# Patient Record
Sex: FEMALE | Race: WHITE | NOT HISPANIC OR LATINO | Employment: FULL TIME | URBAN - METROPOLITAN AREA
[De-identification: names, ages, dates, MRNs, and addresses within clinical notes are randomized per-mention and may not be internally consistent; named-entity substitution may affect disease eponyms.]

---

## 2017-05-01 ENCOUNTER — HOSPITAL ENCOUNTER (EMERGENCY)
Facility: HOSPITAL | Age: 27
Discharge: HOME | End: 2017-05-01
Attending: EMERGENCY MEDICINE | Admitting: EMERGENCY MEDICINE
Payer: COMMERCIAL

## 2017-05-01 VITALS
DIASTOLIC BLOOD PRESSURE: 57 MMHG | RESPIRATION RATE: 16 BRPM | HEIGHT: 61 IN | WEIGHT: 157.6 LBS | SYSTOLIC BLOOD PRESSURE: 112 MMHG | BODY MASS INDEX: 29.76 KG/M2 | HEART RATE: 83 BPM | OXYGEN SATURATION: 100 % | TEMPERATURE: 98.7 F

## 2017-05-01 DIAGNOSIS — J01.00 ACUTE NON-RECURRENT MAXILLARY SINUSITIS: Primary | ICD-10-CM

## 2017-05-01 PROCEDURE — 99283 EMERGENCY DEPT VISIT LOW MDM: CPT

## 2017-05-01 RX ORDER — AZITHROMYCIN 250 MG/1
TABLET, FILM COATED ORAL
Qty: 6 TABLET | Refills: 0 | Status: SHIPPED | OUTPATIENT
Start: 2017-05-01 | End: 2019-06-04 | Stop reason: ALTCHOICE

## 2017-05-01 RX ORDER — OXYMETAZOLINE HYDROCHLORIDE 0.05 G/100ML
2 SPRAY NASAL ONCE
Status: COMPLETED | OUTPATIENT
Start: 2017-05-01 | End: 2017-05-01

## 2017-05-01 RX ADMIN — OXYMETAZOLINE HYDROCHLORIDE 2 SPRAY: 5 SPRAY NASAL at 14:31

## 2019-06-04 ENCOUNTER — TRANSCRIBE ORDERS (OUTPATIENT)
Dept: LAB | Facility: CLINIC | Age: 29
End: 2019-06-04

## 2019-06-04 ENCOUNTER — OFFICE VISIT (OUTPATIENT)
Dept: FAMILY MEDICINE CLINIC | Facility: CLINIC | Age: 29
End: 2019-06-04
Payer: COMMERCIAL

## 2019-06-04 ENCOUNTER — LAB (OUTPATIENT)
Dept: LAB | Facility: CLINIC | Age: 29
End: 2019-06-04
Payer: COMMERCIAL

## 2019-06-04 VITALS
HEART RATE: 100 BPM | OXYGEN SATURATION: 99 % | DIASTOLIC BLOOD PRESSURE: 78 MMHG | TEMPERATURE: 98.5 F | WEIGHT: 173 LBS | HEIGHT: 61 IN | SYSTOLIC BLOOD PRESSURE: 124 MMHG | RESPIRATION RATE: 12 BRPM | BODY MASS INDEX: 32.66 KG/M2

## 2019-06-04 DIAGNOSIS — Z13.220 SCREENING FOR LIPOID DISORDERS: ICD-10-CM

## 2019-06-04 DIAGNOSIS — F33.9 EPISODE OF RECURRENT MAJOR DEPRESSIVE DISORDER, UNSPECIFIED DEPRESSION EPISODE SEVERITY (HCC): ICD-10-CM

## 2019-06-04 DIAGNOSIS — M79.10 MYALGIA: ICD-10-CM

## 2019-06-04 DIAGNOSIS — Z13.6 SCREENING FOR CARDIOVASCULAR CONDITION: ICD-10-CM

## 2019-06-04 DIAGNOSIS — R53.83 FATIGUE, UNSPECIFIED TYPE: ICD-10-CM

## 2019-06-04 DIAGNOSIS — F43.23 ADJUSTMENT DISORDER WITH MIXED ANXIETY AND DEPRESSED MOOD: ICD-10-CM

## 2019-06-04 DIAGNOSIS — Z00.00 ANNUAL PHYSICAL EXAM: Primary | ICD-10-CM

## 2019-06-04 DIAGNOSIS — E66.9 CLASS 1 OBESITY WITHOUT SERIOUS COMORBIDITY WITH BODY MASS INDEX (BMI) OF 32.0 TO 32.9 IN ADULT, UNSPECIFIED OBESITY TYPE: ICD-10-CM

## 2019-06-04 DIAGNOSIS — R53.83 FATIGUE, UNSPECIFIED TYPE: Primary | ICD-10-CM

## 2019-06-04 DIAGNOSIS — Z13.1 SCREENING FOR DIABETES MELLITUS: ICD-10-CM

## 2019-06-04 DIAGNOSIS — F41.9 ANXIETY: ICD-10-CM

## 2019-06-04 PROBLEM — E66.811 CLASS 1 OBESITY WITHOUT SERIOUS COMORBIDITY WITH BODY MASS INDEX (BMI) OF 32.0 TO 32.9 IN ADULT: Status: ACTIVE | Noted: 2019-06-04

## 2019-06-04 LAB
25(OH)D3 SERPL-MCNC: 11.9 NG/ML (ref 30–100)
ALBUMIN SERPL BCP-MCNC: 3.9 G/DL (ref 3.5–5)
ALP SERPL-CCNC: 86 U/L (ref 46–116)
ALT SERPL W P-5'-P-CCNC: 21 U/L (ref 12–78)
ANION GAP SERPL CALCULATED.3IONS-SCNC: 10 MMOL/L (ref 4–13)
AST SERPL W P-5'-P-CCNC: 13 U/L (ref 5–45)
BASOPHILS # BLD AUTO: 0.04 THOUSANDS/ΜL (ref 0–0.1)
BASOPHILS NFR BLD AUTO: 0 % (ref 0–1)
BILIRUB SERPL-MCNC: 0.4 MG/DL (ref 0.2–1)
BUN SERPL-MCNC: 12 MG/DL (ref 5–25)
CALCIUM SERPL-MCNC: 9.2 MG/DL (ref 8.3–10.1)
CHLORIDE SERPL-SCNC: 103 MMOL/L (ref 100–108)
CHOLEST SERPL-MCNC: 132 MG/DL (ref 50–200)
CO2 SERPL-SCNC: 24 MMOL/L (ref 21–32)
CREAT SERPL-MCNC: 0.84 MG/DL (ref 0.6–1.3)
EOSINOPHIL # BLD AUTO: 0.13 THOUSAND/ΜL (ref 0–0.61)
EOSINOPHIL NFR BLD AUTO: 1 % (ref 0–6)
ERYTHROCYTE [DISTWIDTH] IN BLOOD BY AUTOMATED COUNT: 13.2 % (ref 11.6–15.1)
EST. AVERAGE GLUCOSE BLD GHB EST-MCNC: 105 MG/DL
FERRITIN SERPL-MCNC: 58 NG/ML (ref 8–388)
GFR SERPL CREATININE-BSD FRML MDRD: 96 ML/MIN/1.73SQ M
GLUCOSE P FAST SERPL-MCNC: 90 MG/DL (ref 65–99)
HBA1C MFR BLD: 5.3 % (ref 4.2–6.3)
HCT VFR BLD AUTO: 38.5 % (ref 34.8–46.1)
HDLC SERPL-MCNC: 48 MG/DL (ref 40–60)
HGB BLD-MCNC: 12.2 G/DL (ref 11.5–15.4)
IMM GRANULOCYTES # BLD AUTO: 0.04 THOUSAND/UL (ref 0–0.2)
IMM GRANULOCYTES NFR BLD AUTO: 0 % (ref 0–2)
IRON SERPL-MCNC: 114 UG/DL (ref 50–170)
LDLC SERPL CALC-MCNC: 69 MG/DL (ref 0–100)
LYMPHOCYTES # BLD AUTO: 1.86 THOUSANDS/ΜL (ref 0.6–4.47)
LYMPHOCYTES NFR BLD AUTO: 21 % (ref 14–44)
MCH RBC QN AUTO: 26.9 PG (ref 26.8–34.3)
MCHC RBC AUTO-ENTMCNC: 31.7 G/DL (ref 31.4–37.4)
MCV RBC AUTO: 85 FL (ref 82–98)
MONOCYTES # BLD AUTO: 0.71 THOUSAND/ΜL (ref 0.17–1.22)
MONOCYTES NFR BLD AUTO: 8 % (ref 4–12)
NEUTROPHILS # BLD AUTO: 6.23 THOUSANDS/ΜL (ref 1.85–7.62)
NEUTS SEG NFR BLD AUTO: 70 % (ref 43–75)
NONHDLC SERPL-MCNC: 84 MG/DL
NRBC BLD AUTO-RTO: 0 /100 WBCS
PLATELET # BLD AUTO: 306 THOUSANDS/UL (ref 149–390)
PMV BLD AUTO: 10 FL (ref 8.9–12.7)
POTASSIUM SERPL-SCNC: 3.8 MMOL/L (ref 3.5–5.3)
PROT SERPL-MCNC: 7.9 G/DL (ref 6.4–8.2)
RBC # BLD AUTO: 4.54 MILLION/UL (ref 3.81–5.12)
RETICS # AUTO: NORMAL 10*3/UL (ref 14097–95744)
RETICS # CALC: 1.35 % (ref 0.37–1.87)
SODIUM SERPL-SCNC: 137 MMOL/L (ref 136–145)
TIBC SERPL-MCNC: 313 UG/DL (ref 250–450)
TRIGL SERPL-MCNC: 77 MG/DL
TSH SERPL DL<=0.05 MIU/L-ACNC: 1.26 UIU/ML (ref 0.36–3.74)
WBC # BLD AUTO: 9.01 THOUSAND/UL (ref 4.31–10.16)

## 2019-06-04 PROCEDURE — 80061 LIPID PANEL: CPT

## 2019-06-04 PROCEDURE — 84443 ASSAY THYROID STIM HORMONE: CPT

## 2019-06-04 PROCEDURE — 83540 ASSAY OF IRON: CPT

## 2019-06-04 PROCEDURE — 1036F TOBACCO NON-USER: CPT | Performed by: FAMILY MEDICINE

## 2019-06-04 PROCEDURE — 99385 PREV VISIT NEW AGE 18-39: CPT | Performed by: FAMILY MEDICINE

## 2019-06-04 PROCEDURE — 80053 COMPREHEN METABOLIC PANEL: CPT

## 2019-06-04 PROCEDURE — 85045 AUTOMATED RETICULOCYTE COUNT: CPT

## 2019-06-04 PROCEDURE — 83036 HEMOGLOBIN GLYCOSYLATED A1C: CPT

## 2019-06-04 PROCEDURE — 85025 COMPLETE CBC W/AUTO DIFF WBC: CPT

## 2019-06-04 PROCEDURE — 83550 IRON BINDING TEST: CPT

## 2019-06-04 PROCEDURE — 99203 OFFICE O/P NEW LOW 30 MIN: CPT | Performed by: FAMILY MEDICINE

## 2019-06-04 PROCEDURE — 36415 COLL VENOUS BLD VENIPUNCTURE: CPT

## 2019-06-04 PROCEDURE — 82728 ASSAY OF FERRITIN: CPT

## 2019-06-04 PROCEDURE — 3008F BODY MASS INDEX DOCD: CPT | Performed by: FAMILY MEDICINE

## 2019-06-04 PROCEDURE — 82306 VITAMIN D 25 HYDROXY: CPT

## 2019-06-17 ENCOUNTER — PATIENT MESSAGE (OUTPATIENT)
Dept: FAMILY MEDICINE CLINIC | Facility: CLINIC | Age: 29
End: 2019-06-17

## 2019-06-17 DIAGNOSIS — E55.9 VITAMIN D DEFICIENCY: ICD-10-CM

## 2019-06-17 DIAGNOSIS — F41.9 ANXIETY: Primary | ICD-10-CM

## 2019-06-17 DIAGNOSIS — F33.9 EPISODE OF RECURRENT MAJOR DEPRESSIVE DISORDER, UNSPECIFIED DEPRESSION EPISODE SEVERITY (HCC): ICD-10-CM

## 2019-06-17 DIAGNOSIS — F43.23 ADJUSTMENT DISORDER WITH MIXED ANXIETY AND DEPRESSED MOOD: ICD-10-CM

## 2019-06-17 RX ORDER — ERGOCALCIFEROL (VITAMIN D2) 1250 MCG
50000 CAPSULE ORAL WEEKLY
Qty: 12 CAPSULE | Refills: 0 | Status: SHIPPED | OUTPATIENT
Start: 2019-06-17 | End: 2019-09-13

## 2019-07-10 ENCOUNTER — TELEPHONE (OUTPATIENT)
Dept: FAMILY MEDICINE CLINIC | Facility: CLINIC | Age: 29
End: 2019-07-10

## 2019-07-10 DIAGNOSIS — F41.9 ANXIETY: ICD-10-CM

## 2019-07-10 DIAGNOSIS — F33.9 EPISODE OF RECURRENT MAJOR DEPRESSIVE DISORDER, UNSPECIFIED DEPRESSION EPISODE SEVERITY (HCC): Primary | ICD-10-CM

## 2019-07-10 RX ORDER — ESCITALOPRAM OXALATE 20 MG/1
TABLET ORAL
Qty: 30 TABLET | Refills: 1 | Status: SHIPPED | OUTPATIENT
Start: 2019-07-10 | End: 2019-09-13

## 2019-07-10 NOTE — TELEPHONE ENCOUNTER
Patient called the Zoloft that she was put on is making her more anxious, she would really like to speak to someone today about this

## 2019-07-10 NOTE — TELEPHONE ENCOUNTER
Placed call to patient, patient stated she has been on Zoloft 50 mg for 3 weeks now and the last week and a half she has been experiencing severe anxiety  Driving in the car gives her anxiety, dealing with her children gives her anxiety, she stated basically everything gives her anxiety  Anxiety attacks happen everyday and are happening multiple times a day  Did experience a panic attack a couple days ago  Patient denies sweating, chest pain, confusion and suicidal ideations  Is still currently on the Zoloft 50 mg once day  Please advise

## 2019-07-10 NOTE — TELEPHONE ENCOUNTER
Would advise switching from Zoloft 50mg QD to alternative SSRI Lexapro 20mg - 1/2 pill daily x 1 week, then increase to 1 pill daily thereafter  Rx sent to retail pharmacy  She should start Lexapro the next day after taking Zoloft - no missed pills or taking both meds on the same day  Continue plan of care  - Repeat CMP (dated 7/17/19) 1 month after starting Lexapro  Reschedule F/U appt for 6 weeks after starting Lexparo -      Return in about 6 weeks (around 7/16/2019) for Recheck Anx/Dep, Adjust D/O, Obesity, Labs  Counseling may also be helpful  Refer to the back of insurance card for counseling options

## 2019-07-10 NOTE — TELEPHONE ENCOUNTER
Placed call to patient and informed patient of medication switch  Her appointment was rescheduled for 8/26/19  Patient had no further question or concerns at the time  Lab script for CMP will be mailed to patient

## 2019-09-13 ENCOUNTER — OFFICE VISIT (OUTPATIENT)
Dept: FAMILY MEDICINE CLINIC | Facility: CLINIC | Age: 29
End: 2019-09-13
Payer: COMMERCIAL

## 2019-09-13 ENCOUNTER — VBI (OUTPATIENT)
Dept: ADMINISTRATIVE | Facility: OTHER | Age: 29
End: 2019-09-13

## 2019-09-13 VITALS
OXYGEN SATURATION: 100 % | DIASTOLIC BLOOD PRESSURE: 76 MMHG | HEIGHT: 61 IN | TEMPERATURE: 98.9 F | RESPIRATION RATE: 16 BRPM | WEIGHT: 176.4 LBS | HEART RATE: 78 BPM | SYSTOLIC BLOOD PRESSURE: 118 MMHG | BODY MASS INDEX: 33.3 KG/M2

## 2019-09-13 DIAGNOSIS — F41.9 ANXIETY: Primary | ICD-10-CM

## 2019-09-13 DIAGNOSIS — G47.00 INSOMNIA, UNSPECIFIED TYPE: ICD-10-CM

## 2019-09-13 DIAGNOSIS — E55.9 VITAMIN D DEFICIENCY: ICD-10-CM

## 2019-09-13 DIAGNOSIS — E66.9 CLASS 1 OBESITY WITHOUT SERIOUS COMORBIDITY WITH BODY MASS INDEX (BMI) OF 33.0 TO 33.9 IN ADULT, UNSPECIFIED OBESITY TYPE: ICD-10-CM

## 2019-09-13 DIAGNOSIS — F33.9 EPISODE OF RECURRENT MAJOR DEPRESSIVE DISORDER, UNSPECIFIED DEPRESSION EPISODE SEVERITY (HCC): ICD-10-CM

## 2019-09-13 PROCEDURE — 99214 OFFICE O/P EST MOD 30 MIN: CPT | Performed by: FAMILY MEDICINE

## 2019-09-13 RX ORDER — DULOXETIN HYDROCHLORIDE 30 MG/1
30 CAPSULE, DELAYED RELEASE ORAL DAILY
Qty: 7 CAPSULE | Refills: 0 | Status: SHIPPED | OUTPATIENT
Start: 2019-09-13 | End: 2019-09-20

## 2019-09-13 RX ORDER — DULOXETIN HYDROCHLORIDE 60 MG/1
60 CAPSULE, DELAYED RELEASE ORAL DAILY
Qty: 30 CAPSULE | Refills: 1 | Status: SHIPPED | OUTPATIENT
Start: 2019-09-13 | End: 2019-10-25

## 2019-09-13 NOTE — ASSESSMENT & PLAN NOTE
Worsening  D/C Lexapro 20mg QD  Start Cymbalta 60mg QD  Patient encouraged to resume counseling  Smart phone abel list given

## 2019-09-13 NOTE — ASSESSMENT & PLAN NOTE
Vitamin D Deficiency - Recommend start multivitamin and prescription vitamin D (Drisdol)  When 12 weeks of Drisdol completed, continue multivitamin and start over-the-counter Vitamin D3 1,000-3,000 International Units daily    Check vitamin D level 1 week afterwards completing Drisdol

## 2019-09-13 NOTE — TELEPHONE ENCOUNTER
Shane Lambert, DO  [address]             I have reviewed both our old [address] and need your assistance in locating Immunization(s)  Per Patient, testing was done by Sammie Severance you  Unsure of Tdap status   [facility] Dr Avelina Dawkins delivered her children in 2013 at [facility] check for Tdap with OB or [facility]                09/13/2019 10:23 AM Phone (Viktoriya Marshall) Gabby Botello at [facility] Dr Avelina Dawkins (Provider) [phone] Remove   Call Complete - Asked for immunization record (tdap); she stats Dr Avelina Dawkins & practice do not ask for proof of immuzations; they list only 'patient reported' immunization status in the chart notes  They also do not provide immunizations to patients         By Pio English MA

## 2019-09-13 NOTE — PATIENT INSTRUCTIONS
Apps and Websites for Counseling, Anxiety/Depression, Chronic Pain, Lifestyle Change, Problem-solving, Self-Esteem, Anger Management, Coping with Uncertainty    (Prices current as of 9/5/19)    1  Mood Kit - Available in [employer] Deepa Store for $4 99  Supports a person's success in specific situations, includes thought , mood tracker, and journal   Can be accessed in an unstructured way and used as an unguided self-help deepa  2   Moodnotes - Available in [employer] Deep[employer] Store for $4 99  Focuses on healthier thinking habits and identifying "traps" in thinking  Tracks mood over a period of time and identifies factors that influence mood  3   MoodMission - Available in [facility] for free  Includes five "missions" to promote confidence in handling stressors and coping in specific situations  The deepa personalizes its style and techniques based on when the user engages it most frequently  In-deepa rewards are used to motivate, increase fun and self-confidence  Helpful for patients who need improvement in mood or a decrease in anxiety and depression symptoms  4    What's Up - Available in [employer] for free  Recognizes negative thinking patterns and methods to overcome them  Uses helpful metaphors, a catastrophe scale, grounding techniques, and breathing exercises  Has the ability to sync data across multiple devices and protect this information with a unique pass code  Has the capability to be active in forums where people can discuss similar feelings and strategies that have been helpful  5   Moodpath - Available in [employer] for free  Uses daily screenings to create a better understanding of thoughts, feelings, and emotions  When needed, it provides a discussion guide to talking with a medical professional based on the responses to daily screenings    Includes over 150 psychological exercises and videos to promote and strengthen overall mental health  6   MindShift - Available in [employer] for free  Helpful for youth and young adults in coping with anxiety  Creates an individualized toolbox to help users deal with test anxiety, perfectionism, social anxiety, worry, panic, and conflict  Includes directions on how to construct belief experiments to trial common beliefs that feed anxiety, guided relaxation, as well as tools and tips to help establish and accomplish goals  Differentiates between helpful and unhelpful anxiety, and explains how to overcome fears by gradually facing them in manageable steps  7   CBT-I  - Available in [employer] for free  For insomnia  Educates about sleep, developing positive sleep routines, and improved sleep environment  Encourages user to change behaviors which will provide confidence for better sleep on a regular basis  8   [employer] - Free website  Provides self-help and therapy resources that encourages change to combat negative and destructive thought patterns  Includes access to numerous handouts on a variety of symptoms related to anxiety, depression, low self-esteem, panic attacks, social disorders, and more  The solution section has interventions that can be printed and saved for future use  9   Woebot - Available in [employer] for free  Recommended for age 16+  Friendly self-care  that helps you think through situations with step-by-step guidance using counseling tools, learn about yourself with intelligent mood tracking, and master skills to reduce stress and live happier through 100+ evidence-based stories from clinicians  Chat as often or as little as you'd like, whenever you'd like to  10   [place]ne:  KELLI  CBT DBT Chatbot - Available in Apple appsstore and Google Play for free, has in-abel purchases  Recommended for 12+  Psychologist support at $30/month, 50% off to start    Christiana Knowles / chatbot is free  Uses techniques of CBT, DBT, yoga, and meditation to support your needs regarding stress, anxiety, sleep, loss, and a full range of other mental health and wellness issues  11   Curable Pain Relief - Available in [employer] Deepa store and Google Play for free, has in-deepa purchases  Teaches about the chronic pain cycle and how to reverse it, while retraining your brain and nervous system for long-term results  Smart  Tomasa guides user through updates in pain science to identify, target, eliminate the factors that are keeping user "stuck" in the pain cycle  12   [employer] [facility] - Available in Apple Deepa store and Google Play for a fee  Subscription service, starting at $59/week (billed monthly)  All plans include unlimited messaging, and add live video sessions if desired  Unlimited messaging therapy for teens ages 15-14 and special services for couples therapy  You can change therapists or stop subscription renewal at any time  Recommended for 13+  User is matched with a licensed therapist in your state and communicate on your device by text, audio, and video from anywhere, at any time  User will hear back at least once a day, 5 days per week  Refer to the back of insurance card for counseling options  Low vitamin D - Recommend start multivitamin and over-the-counter vitamin D3 1000 - 3000 International Units daily  Insomnia   AMBULATORY CARE:   Insomnia  is a condition that makes it hard to fall or stay asleep  Lack of sleep can lead to attention or memory problems during the day  You may also be tarango, depressed, clumsy, or have headaches  Contact your healthcare provider if:   · Your symptoms do not get better, or they get worse  · You begin to use drugs or alcohol to fall asleep  · You have questions or concerns about your condition or care  Medicines:   · Medicines  may help you sleep more regularly or help you feel less anxious      · Take your medicine as directed  Contact your healthcare provider if you think your medicine is not helping or if you have side effects  Tell him or her if you are allergic to any medicine  Keep a list of the medicines, vitamins, and herbs you take  Include the amounts, and when and why you take them  Bring the list or the pill bottles to follow-up visits  Carry your medicine list with you in case of an emergency  What you can do to improve your sleep:   · Create a sleep schedule  This will help you form a sleep routine  Keep a record of your sleep patterns, and any sleeping problems you have  Bring the record to follow-up visits with healthcare providers  · Do not take naps  Naps could make it hard for you to fall asleep at bedtime  · Keep your bedroom cool, quiet, and dark  Turn on white noise, such as a fan, to help you relax  Do not use your bed for any activity that will keep you awake  Do not read, exercise, eat, or watch TV in your bedroom  · Get up if you do not fall asleep within 20 minutes  Move to another room and do something relaxing until you become sleepy  · Limit caffeine, alcohol, and food to earlier in the day  Only drink caffeine in the morning  Do not drink alcohol within 6 hours of bedtime  Do not eat a heavy meal right before you go to bed  · Exercise regularly  Daily exercise may help you sleep better  Do not exercise within 4 hours of bedtime  Follow up with your healthcare provider as directed: Your healthcare provider may refer you for cognitive behavioral therapy  A behavioral therapist may help you find ways to relax, decrease stress, and improve sleep  Write down your questions so you remember to ask them during your visits  © 2017 [address] [employer]  Information is for End User's use only and may not be sold, redistributed or otherwise used for commercial purposes   All illustrations and images included in CareNotes® are the copyrighted property of [employer] â€[employer]/ , [employer]  or [employer]  The above information is an  only  It is not intended as medical advice for individual conditions or treatments  Talk to your doctor, nurse or pharmacist before following any medical regimen to see if it is safe and effective for you

## 2019-09-20 ENCOUNTER — OFFICE VISIT (OUTPATIENT)
Dept: FAMILY MEDICINE CLINIC | Facility: CLINIC | Age: 29
End: 2019-09-20
Payer: COMMERCIAL

## 2019-09-20 VITALS
HEART RATE: 82 BPM | RESPIRATION RATE: 16 BRPM | SYSTOLIC BLOOD PRESSURE: 106 MMHG | DIASTOLIC BLOOD PRESSURE: 62 MMHG | WEIGHT: 175 LBS | TEMPERATURE: 98.4 F | BODY MASS INDEX: 33.04 KG/M2 | HEIGHT: 61 IN | OXYGEN SATURATION: 100 %

## 2019-09-20 DIAGNOSIS — J32.2 ETHMOID SINUSITIS, UNSPECIFIED CHRONICITY: Primary | ICD-10-CM

## 2019-09-20 DIAGNOSIS — J02.9 SORE THROAT: ICD-10-CM

## 2019-09-20 DIAGNOSIS — E66.9 CLASS 1 OBESITY WITHOUT SERIOUS COMORBIDITY WITH BODY MASS INDEX (BMI) OF 33.0 TO 33.9 IN ADULT, UNSPECIFIED OBESITY TYPE: ICD-10-CM

## 2019-09-20 DIAGNOSIS — R05.9 COUGH: ICD-10-CM

## 2019-09-20 LAB — S PYO AG THROAT QL: NEGATIVE

## 2019-09-20 PROCEDURE — 99214 OFFICE O/P EST MOD 30 MIN: CPT | Performed by: FAMILY MEDICINE

## 2019-09-20 PROCEDURE — 3008F BODY MASS INDEX DOCD: CPT | Performed by: FAMILY MEDICINE

## 2019-09-20 PROCEDURE — 87880 STREP A ASSAY W/OPTIC: CPT | Performed by: FAMILY MEDICINE

## 2019-09-20 RX ORDER — FLUTICASONE PROPIONATE 50 MCG
2 SPRAY, SUSPENSION (ML) NASAL DAILY PRN
Qty: 1 BOTTLE | Refills: 0 | Status: SHIPPED | OUTPATIENT
Start: 2019-09-20 | End: 2019-10-25 | Stop reason: ALTCHOICE

## 2019-09-20 RX ORDER — BENZONATATE 100 MG/1
100 CAPSULE ORAL 3 TIMES DAILY PRN
Qty: 30 CAPSULE | Refills: 0 | Status: SHIPPED | OUTPATIENT
Start: 2019-09-20 | End: 2019-09-30

## 2019-09-20 NOTE — PROGRESS NOTES
Assessment/Plan:  Problem List Items Addressed This Visit        Other    Class 1 obesity without serious comorbidity with body mass index (BMI) of 33 0 to 33 9 in adult     Stable  Recommend lifestyle modifications  Other Visit Diagnoses     Ethmoid sinusitis, unspecified chronicity    -  Primary    Relevant Medications    fluticasone (FLONASE) 50 mcg/act nasal spray    Suspect viral etiology  Advise Claudetta Rhodes med sinus rinse kit, Mucinex, Claritin/Zyrtec/Allegra  Avoid decongestants if you have high blood pressure  Start Flonase, Tessalon PRN  Sore throat        Relevant Orders    POCT rapid strepA (Completed) - Negative    See above  See above  Cough        Relevant Medications    benzonatate (TESSALON PERLES) 100 mg capsule           Return if symptoms worsen or fail to improve, for Return in about 6 weeks (around 10/25/2019) for  Recheck Anx/Dep, Adjust D/O, Obesity, Labs  Future Appointments   Date Time Provider Samuel Alamo   10/25/2019  8:00 AM Aliza Walker DO [facility]        Subjective:     [facility] is a 34 y o  female who presents today for a follow-up on her acute medical conditions  HPI:  Chief Complaint   Patient presents with    Sore Throat     started over the weekend, went away and is now back, tension/sinus headache 1-2 times daily     Cough     x 4 days  Denies fever  -- Above per clinical staff and reviewed  --    HPI      Today:      Sore throat - Symptoms x 4-5 days  Intermittent symptoms  Worse c cough  Cough - Symptoms x 4 days  Productive cough  Occurs during day and night, producing insomnia   +Fluids  No sick contacts  Using Dayquil x 2 days c symptoms relief x 1 hour  No rhinorrhea or PND  Ethmoid pressure occurs in AM and improves throughout the day        The following portions of the patient's history were reviewed and updated as appropriate: allergies, current medications, past family history, past medical history, past social history, past surgical history and problem list       Review of Systems   Constitutional: Positive for fatigue  Negative for appetite change, chills, diaphoresis and fever  HENT: Positive for sinus pressure and sore throat  Negative for postnasal drip  Respiratory: Positive for cough and shortness of breath (With SAMAYOA or cough)  Negative for chest tightness and wheezing  Cardiovascular: Negative for chest pain  Gastrointestinal: Negative for abdominal pain, blood in stool, diarrhea, nausea and vomiting  Genitourinary: Negative for dysuria  Current Outpatient Medications   Medication Sig Dispense Refill    DULoxetine (CYMBALTA) 60 mg delayed release capsule Take 1 capsule (60 mg total) by mouth daily Start after taking 30mg daily x 1 week  30 capsule 1    benzonatate (TESSALON PERLES) 100 mg capsule Take 1 capsule (100 mg total) by mouth 3 (three) times a day as needed for cough for up to 10 days 30 capsule 0    fluticasone (FLONASE) 50 mcg/act nasal spray 2 sprays into each nostril daily as needed for rhinitis for up to 10 days 1 Bottle 0     No current facility-administered medications for this visit  Objective:  /62   Pulse 82   Temp 98 4 °F (36 9 °C) (Tympanic)   Resp 16   Ht 5' 1 02" (1 55 m)   Wt 79 4 kg (175 lb)   SpO2 100%   BMI 33 04 kg/m²    Wt Readings from Last 3 Encounters:   09/20/19 79 4 kg (175 lb)   09/13/19 80 kg (176 lb 6 4 oz)   06/04/19 78 5 kg (173 lb)      BP Readings from Last 3 Encounters:   09/20/19 106/62   09/13/19 118/76   06/04/19 124/78          Physical Exam   Constitutional: She is oriented to person, place, and time  She appears well-developed and well-nourished  HENT:   Head: Normocephalic and atraumatic  Right Ear: Tympanic membrane, external ear and ear canal normal    Left Ear: Tympanic membrane, external ear and ear canal normal    Nose: Mucosal edema present   Right sinus exhibits no maxillary sinus tenderness and no frontal sinus tenderness  Left sinus exhibits no maxillary sinus tenderness and no frontal sinus tenderness  Mouth/Throat: Uvula is midline, oropharynx is clear and moist and mucous membranes are normal  No tonsillar exudate  Eyes: Conjunctivae and EOM are normal    Neck: Neck supple  Cardiovascular: Normal rate, regular rhythm, normal heart sounds and intact distal pulses  Pulmonary/Chest: Effort normal and breath sounds normal    Musculoskeletal: She exhibits no edema or tenderness  Lymphadenopathy:     She has no cervical adenopathy  Neurological: She is alert and oriented to person, place, and time  Skin: No rash noted  Psychiatric: She has a normal mood and affect  Her behavior is normal  Judgment and thought content normal    Nursing note and vitals reviewed  Lab Results:      Lab Results   Component Value Date    WBC 9 01 06/04/2019    HGB 12 2 06/04/2019    HCT 38 5 06/04/2019     06/04/2019    TRIG 77 06/04/2019    HDL 48 06/04/2019    ALT 21 06/04/2019    AST 13 06/04/2019    K 3 8 06/04/2019     06/04/2019    CREATININE 0 84 06/04/2019    BUN 12 06/04/2019    CO2 24 06/04/2019    GLUF 90 06/04/2019    HGBA1C 5 3 06/04/2019     No results found for: URICACID  Invalid input(s): BASENAME Vitamin D    No results found       POCT Labs

## 2019-09-20 NOTE — PATIENT INSTRUCTIONS
[facility], Mucinex, Claritin/Zyrtec/Allegra  Avoid decongestants if you have high blood pressure  Consider using antibiotic for sinus infection if you develop fever 100 4 or greater, symptoms wax/wane, or symptoms do not improve within 10 days of onset  You may use Motrin (Ibuprofen) up to 800mg 3 times daily with food (in 24 hours) as needed for pain or fever  You may use Tylenol (Acetaminophen) up to 3,000mg daily (in 24 hours) as needed for pain or fever

## 2019-10-23 NOTE — PROGRESS NOTES
Assessment/Plan:  Problem List Items Addressed This Visit        Other    Class 1 obesity without serious comorbidity with body mass index (BMI) of 33 0 to 33 9 in adult     Stable  Recommend lifestyle modifications  Anxiety - Primary     Improved, but still symptomatic  D/C Cymbalta 60mg QD due to worsening depression  Start Effexor XR 75mg QD  Resume counseling  Smartphone abel list previously given  Relevant Medications    venlafaxine (EFFEXOR-XR) 37 5 mg 24 hr capsule    Episode of recurrent major depressive disorder (HCC)     Worsening  D/C Cymbalta 60mg QD due to worsening depression  Start Effexor XR 75mg QD  Resume counseling  Smartphone abel list previously given  Relevant Medications    venlafaxine (EFFEXOR-XR) 37 5 mg 24 hr capsule    Vitamin D deficiency     Pending labs  S/p Drisdol  Continue OTC MVI and Vitamin D 3,000IU daily  Other Visit Diagnoses     Need for vaccination        Relevant Orders    influenza vaccine, [id number], quadrivalent, 0 5 mL, preservative-free, for adult and pediatric patients 6 mos+ (AFLURIA, FLUARIX, FLULAVAL, FLUZONE) (Completed)           Return in about 6 weeks (around 12/6/2019) for Recheck Anx/Dep, Adjust D/O, Obesity, Labs  Future Appointments   Date Time Provider Samuel [place]   12/9/2019  8:40 AM Gricelda Xie DO [facility]        Subjective:     Wendy Tovar is a 34 y o  female who presents today for a follow-up on her chronic medical conditions  HPI:  Chief Complaint   Patient presents with    Follow-up     6 week f/u, flu shot      -- Above per clinical staff and reviewed  --      HPI      Today:      Return if symptoms worsen or fail to improve, for Return in about 6 weeks (around 10/25/2019) for  Recheck Anx/Dep, Adjust D/O, Obesity, Labs     Did not complete labs  Plans to go 11/25/19        Obesity - Watching diet - eating smaller portions, less junk food, cut back to 10oz soda daily   No Exercise - Walking 2 miles at job  Wants to join a gym        Vitamin D Deficiency - s/p Christal  Taking MVI and OTC Vitamin D 3,000IU daily          Adjustment Disorder - On Cymbalta 60mg QD x 6 weeks  Anxiety is improved, but depression is worse in the past few days s trigger  D/C Lexapro 20mg QD x 6 weeks due to worsening anxiety  D/C Zoloft 50mg QD x 3 weeks due to worsening anxiety  Last saw counselor , which was helpful  She plans to schedule an appointment in the future  She is less quick to anger, less easily annoyed, has less unprovoked crying  Improved motivation  Feels that mood is 40% improved, but feels that anxiety is still uncontrolled  She is having anxiety attacks daily over things that she thinks are not important  She is having 2-3 anxiety attacks daily (previously 6-9 attacks), lasting 2-3 minutes  Previously, anxiety attacks last a couple to 20-30 minutes  Anxiety provoked by famiy stress as she is  from her   Breathing exercises have been helpful  Symptoms since  since birth of twin sons  No other mood meds or counseling previously  [facility] [address] social supports  No SI/HI/AH/VH        Insomnia - Intermittent  Can fall asleep after 1 hour, but sometimes awakens after 1 5-2 / 4 hours of sleep  Sleeping for 6 hours interrupted  Unsure of trigger  Has racing thoughts  Drinks 8-10oz caffeine daily    Denies snoring or witnessed apnea          PHQ-9 Depression Screening    PHQ-9:    Frequency of the following problems over the past two weeks:       Little interest or pleasure in doing things:  1 - several days  Feeling down, depressed, or hopeless:  1 - several days  Trouble falling or staying asleep, or sleeping too much:  3 - nearly every day  Feeling tired or having little energy:  2 - more than half the days  Poor appetite or overeatin - several days  Feeling bad about yourself - or that you are a failure or have let yourself or your family down:  1 - several days  Trouble concentrating on things, such as reading the newspaper or watching television:  0 - not at all  Moving or speaking so slowly that other people could have noticed  Or the opposite - being so fidgety or restless that you have been moving around a lot more than usual:  0 - not at all  Thoughts that you would be better off dead, or of hurting yourself in some way:  0 - not at all  PHQ-2 Score:  2  PHQ-9 Score:  9       BLADIMIR-7 Flowsheet Screening      Most Recent Value   Over the last two weeks, how often have you been bothered by the following problems? Feeling nervous, anxious, or on edge  2   Not being able to stop or control worrying  2   Worrying too much about different things  1   Trouble relaxing   1   Being so restless that it's hard to sit still  1   Becoming easily annoyed or irritable   1   Feeling afraid as if something awful might happen  1   How difficult have these problems made it for you to do your work, take care of things at home, or get along with other people? Somewhat difficult   BLADIMIR Score   9              From previous note:    Return in about 6 weeks (around 7/16/2019) for Recheck Anx/Dep, Adjust D/O, Obesity, Labs      Did not complete labs        Obesity - Trying to watch diet - eating smaller portions   +Regular Exercise - Walking dogs 30 minutes, 7 days per week  Walking 3-4 miles at job        Vitamin D Deficiency - s/p Drisdol  Not taking MVI or OTC Vitamin D         Adjustment Disorder - On Lexapro 20mg QD x 6 weeks  D/C Zoloft 50mg QD x 3 weeks due to worsening anxiety  Last saw counselor 8/19, which was helpful  She plans to schedule an appointment in the future  She is less quick to anger, less easily annoyed, has less unprovoked crying  Improved motivation  Feels that mood is 40% improved, but feels that anxiety is still uncontrolled  She is having anxiety attacks daily over things that she thinks are not important    She has anxiety attack prior to work despite liking her job and co-workers  Anxiety attacks last a couple to 20-30 minutes  Breathing exercises have been helpful  Symptoms since 2013 since birth of twin sons  No other mood meds or counseling previously  [employer] [employer] [facility] [employer] social supports  No SI/HI/AH/VH        Insomnia - Symptoms x 3 weeks  Can fall asleep easily, but awakens after 1 5-2 hours of sleep  Sleeping for 6 hours interrupted  Unsure of trigger  Drinks 8-10oz caffeine daily  Denies snoring or witnessed apnea        PHQ-9 Depression Screening    PHQ-9:    Frequency of the following problems over the past two weeks:       Little interest or pleasure in doing things:  1 - several days  Feeling down, depressed, or hopeless:  1 - several days  Trouble falling or staying asleep, or sleeping too much:  3 - nearly every day  Feeling tired or having little energy:  1 - several days  Poor appetite or overeatin - several days  Feeling bad about yourself - or that you are a failure or have let yourself or your family down:  1 - several days  Trouble concentrating on things, such as reading the newspaper or watching television:  1 - several days  Moving or speaking so slowly that other people could have noticed  Or the opposite - being so fidgety or restless that you have been moving around a lot more than usual:  1 - several days  Thoughts that you would be better off dead, or of hurting yourself in some way:  1 - several days  PHQ-2 Score:  2  PHQ-9 Score:  11             BLADIMIR-7 Flowsheet Screening      Most Recent Value   Over the last two weeks, how often have you been bothered by the following problems?     Feeling nervous, anxious, or on edge  2   Not being able to stop or control worrying  1   Worrying too much about different things  1   Trouble relaxing   1   Being so restless that it's hard to sit still  2   Becoming easily annoyed or irritable   1   Feeling afraid as if something awful might happen  1   How difficult have these problems made it for you to do your work, take care of things at home, or get along with other people? Somewhat difficult   BLADIMIR Score   9          MDQ:  3     From previous note:     Obesity - Trying to watch diet   +Regular Exercise - Walking dogs 30 minutes, 7 days per week        Migraines - Occurs less often - 1-2 times per month, once weekly when younger   Has had migraines since 14yo   Using Aleve / Ibuprofen / Tylenol PRN, rest, helpful   B/L Ethmoid pain - feels like eye strain, can progress to achy, throbbing sensation   Currently 0/10, Max 5-6/10   Lasts 30-45 minutes to 2-3 hours   Denies aura   Sometimes has associated nausea   Denies photophobia and phonophobia   No migraine Rx previously          Adjustment Disorder - Symptoms since 2013 since birth of twin sons    "I'm all over the place mood-wise "  She feels she is quick to anger, easily annoyed, has unprovoked crying   No mood meds or counseling previously   Good social supports  Sofia Orange thoughts about leaving a few days ago, but does not have a plan   Would drive until she runs out of gas   No HI/AH/VH  Joseline Lively her job early 3/19 - no unemployement, has been applying to jobs s success  Monty Cisneros is also stress by finances   She is at home with her kids, laying on the couch, low motivation        PHQ-9 Depression Screening    PHQ-9:    Frequency of the following problems over the past two weeks:       Little interest or pleasure in doing things:  2 - more than half the days  Feeling down, depressed, or hopeless:  2 - more than half the days  Trouble falling or staying asleep, or sleeping too much:  3 - nearly every day  Feeling tired or having little energy:  2 - more than half the days  Poor appetite or overeating:  3 - nearly every day  Feeling bad about yourself - or that you are a failure or have let yourself or your family down:  3 - nearly every day  Trouble concentrating on things, such as reading the newspaper or watching television:  1 - several days  Moving or speaking so slowly that other people could have noticed  Or the opposite - being so fidgety or restless that you have been moving around a lot more than usual:  0 - not at all  Thoughts that you would be better off dead, or of hurting yourself in some way:  1 - several days  PHQ-2 Score:  4  PHQ-9 Score:  17               BLADIMIR-7 Flowsheet Screening      Most Recent Value   Over the last two weeks, how often have you been bothered by the following problems? Feeling nervous, anxious, or on edge  1   Not being able to stop or control worrying  3   Worrying too much about different things  3   Trouble relaxing   2   Being so restless that it's hard to sit still  1   Becoming easily annoyed or irritable   1   Feeling afraid as if something awful might happen  1   How difficult have these problems made it for you to do your work, take care of things at home, or get along with other people?   Very difficult   BLADIMIR Score   12                Reviewed:  Labs 6/4/19,  Gyn 9/6/17     Sees [facility]   Next appt 9/18 - overdue     Unsure of Tdap status  Lala Lauren delivered her children in 2013  The following portions of the patient's history were reviewed and updated as appropriate: allergies, current medications, past family history, past medical history, past social history, past surgical history and problem list       Review of Systems   Constitutional: Negative for appetite change, chills, diaphoresis, fatigue and fever  Respiratory: Negative for chest tightness and shortness of breath  Cardiovascular: Negative for chest pain  Gastrointestinal: Negative for abdominal pain, blood in stool, diarrhea, nausea and vomiting  Genitourinary: Negative for dysuria          Current Outpatient Medications   Medication Sig Dispense Refill    Cholecalciferol (VITAMIN D3) 3000 units TABS Take 1 tablet by mouth daily      Multiple Vitamin (MULTIVITAMIN) tablet Take 1 tablet by mouth daily      venlafaxine (EFFEXOR-XR) 37 5 mg 24 hr capsule 1 tab by mouth once daily x 1 week, then increase to 2 tabs by mouth once daily thereafter  60 capsule 1     No current facility-administered medications for this visit  Objective:  /76   Pulse 84   Temp 97 8 °F (36 6 °C) (Tympanic)   Resp 16   Ht 5' 1 02" (1 55 m)   Wt 80 5 kg (177 lb 6 4 oz)   LMP 01/01/2016 (Within Years)   SpO2 100%   Breastfeeding? No   BMI 33 50 kg/m²    Wt Readings from Last 3 Encounters:   10/25/19 80 5 kg (177 lb 6 4 oz)   09/20/19 79 4 kg (175 lb)   09/13/19 80 kg (176 lb 6 4 oz)      BP Readings from Last 3 Encounters:   10/25/19 112/76   09/20/19 106/62   09/13/19 118/76          Physical Exam   Constitutional: She is oriented to person, place, and time  She appears well-developed and well-nourished  HENT:   Head: Normocephalic and atraumatic  Eyes: Conjunctivae are normal    Neck: Neck supple  Cardiovascular: Normal rate, regular rhythm, normal heart sounds and intact distal pulses  Pulmonary/Chest: Effort normal and breath sounds normal    Musculoskeletal: She exhibits no edema or tenderness  Neurological: She is alert and oriented to person, place, and time  Psychiatric: She has a normal mood and affect  Her behavior is normal  Judgment and thought content normal    Nursing note and vitals reviewed  Lab Results:      Lab Results   Component Value Date    WBC 9 01 06/04/2019    HGB 12 2 06/04/2019    HCT 38 5 06/04/2019     06/04/2019    TRIG 77 06/04/2019    HDL 48 06/04/2019    ALT 21 06/04/2019    AST 13 06/04/2019    K 3 8 06/04/2019     06/04/2019    CREATININE 0 84 06/04/2019    BUN 12 06/04/2019    CO2 24 06/04/2019    GLUF 90 06/04/2019    HGBA1C 5 3 06/04/2019     No results found for: URICACID  Invalid input(s): BASENAME Vitamin D    No results found       POCT Labs

## 2019-10-25 ENCOUNTER — TRANSCRIBE ORDERS (OUTPATIENT)
Dept: LAB | Facility: CLINIC | Age: 29
End: 2019-10-25

## 2019-10-25 ENCOUNTER — LAB (OUTPATIENT)
Dept: LAB | Facility: CLINIC | Age: 29
End: 2019-10-25
Payer: COMMERCIAL

## 2019-10-25 ENCOUNTER — OFFICE VISIT (OUTPATIENT)
Dept: FAMILY MEDICINE CLINIC | Facility: CLINIC | Age: 29
End: 2019-10-25
Payer: COMMERCIAL

## 2019-10-25 VITALS
SYSTOLIC BLOOD PRESSURE: 112 MMHG | WEIGHT: 177.4 LBS | HEART RATE: 84 BPM | HEIGHT: 61 IN | DIASTOLIC BLOOD PRESSURE: 76 MMHG | RESPIRATION RATE: 16 BRPM | OXYGEN SATURATION: 100 % | BODY MASS INDEX: 33.49 KG/M2 | TEMPERATURE: 97.8 F

## 2019-10-25 DIAGNOSIS — E55.9 VITAMIN D DEFICIENCY: ICD-10-CM

## 2019-10-25 DIAGNOSIS — F41.9 ANXIETY: Primary | ICD-10-CM

## 2019-10-25 DIAGNOSIS — E66.9 CLASS 1 OBESITY WITHOUT SERIOUS COMORBIDITY WITH BODY MASS INDEX (BMI) OF 33.0 TO 33.9 IN ADULT, UNSPECIFIED OBESITY TYPE: ICD-10-CM

## 2019-10-25 DIAGNOSIS — F33.9 EPISODE OF RECURRENT MAJOR DEPRESSIVE DISORDER, UNSPECIFIED DEPRESSION EPISODE SEVERITY (HCC): ICD-10-CM

## 2019-10-25 DIAGNOSIS — Z23 NEED FOR VACCINATION: ICD-10-CM

## 2019-10-25 LAB — 25(OH)D3 SERPL-MCNC: 20.2 NG/ML (ref 30–100)

## 2019-10-25 PROCEDURE — 99214 OFFICE O/P EST MOD 30 MIN: CPT | Performed by: FAMILY MEDICINE

## 2019-10-25 PROCEDURE — 90471 IMMUNIZATION ADMIN: CPT | Performed by: FAMILY MEDICINE

## 2019-10-25 PROCEDURE — 90686 IIV4 VACC NO PRSV 0.5 ML IM: CPT | Performed by: FAMILY MEDICINE

## 2019-10-25 PROCEDURE — 36415 COLL VENOUS BLD VENIPUNCTURE: CPT

## 2019-10-25 PROCEDURE — 82306 VITAMIN D 25 HYDROXY: CPT

## 2019-10-25 RX ORDER — GLUCOSAMINE HCL 500 MG
1 TABLET ORAL DAILY
COMMUNITY

## 2019-10-25 RX ORDER — VENLAFAXINE HYDROCHLORIDE 37.5 MG/1
CAPSULE, EXTENDED RELEASE ORAL
Qty: 60 CAPSULE | Refills: 1 | Status: SHIPPED | OUTPATIENT
Start: 2019-10-25 | End: 2020-01-08 | Stop reason: SDUPTHER

## 2019-10-25 RX ORDER — MULTIVITAMIN
1 TABLET ORAL DAILY
COMMUNITY

## 2019-10-25 NOTE — RESULT ENCOUNTER NOTE
Low vitamin D - Recommend continue multivitamin and increase over-the-counter vitamin D3 4000 International Units daily      Message sent to patient via [employer] patient portal

## 2020-01-07 DIAGNOSIS — F41.9 ANXIETY: ICD-10-CM

## 2020-01-07 DIAGNOSIS — F33.9 EPISODE OF RECURRENT MAJOR DEPRESSIVE DISORDER, UNSPECIFIED DEPRESSION EPISODE SEVERITY (HCC): ICD-10-CM

## 2020-01-07 RX ORDER — VENLAFAXINE HYDROCHLORIDE 37.5 MG/1
CAPSULE, EXTENDED RELEASE ORAL
Qty: 60 CAPSULE | Refills: 0 | Status: CANCELLED | OUTPATIENT
Start: 2020-01-07

## 2020-01-07 NOTE — TELEPHONE ENCOUNTER
Medication refill request: effexor   Last office visit:  10/25/2019  Next office visit: cancelled last 2 appts, no follow up   Last refilled: 10/25/19 #60x1  Pharmacy:   [address] [place]  [address] [address]  Phone: [phone] Fax: [phone]

## 2020-01-08 DIAGNOSIS — F41.9 ANXIETY: ICD-10-CM

## 2020-01-08 DIAGNOSIS — F33.9 EPISODE OF RECURRENT MAJOR DEPRESSIVE DISORDER, UNSPECIFIED DEPRESSION EPISODE SEVERITY (HCC): ICD-10-CM

## 2020-01-08 RX ORDER — VENLAFAXINE HYDROCHLORIDE 37.5 MG/1
CAPSULE, EXTENDED RELEASE ORAL
Qty: 60 CAPSULE | Refills: 0 | Status: CANCELLED | OUTPATIENT
Start: 2020-01-08

## 2020-01-08 RX ORDER — VENLAFAXINE HYDROCHLORIDE 37.5 MG/1
75 CAPSULE, EXTENDED RELEASE ORAL DAILY
Qty: 60 CAPSULE | Refills: 0 | Status: SHIPPED | OUTPATIENT
Start: 2020-01-08 | End: 2020-02-17

## 2020-01-08 NOTE — TELEPHONE ENCOUNTER
Patient is overdue for labs 6/17/19 and appt:    Return in about 6 weeks (around 12/6/2019) for Recheck Anx/Dep, Adjust D/O, Obesity, Labs  Patient to schedule appointment soon and complete labs  No further refills without labs and appointment  #60, no refills sent

## 2020-01-08 NOTE — TELEPHONE ENCOUNTER
Called to make appointment, L/M to called back to schedule an appointment and that she needed to complete her labs  And a #60 supply was sent into the pharmacy  Stated that no medication refills will be done until her next appointment

## 2020-01-13 NOTE — TELEPHONE ENCOUNTER
Left another message that she needs to make a follow up and that no medication refills will be done until she is see by Dr Thania Vu

## 2020-02-05 DIAGNOSIS — F41.9 ANXIETY: ICD-10-CM

## 2020-02-05 DIAGNOSIS — F33.9 EPISODE OF RECURRENT MAJOR DEPRESSIVE DISORDER, UNSPECIFIED DEPRESSION EPISODE SEVERITY (HCC): ICD-10-CM

## 2020-02-05 RX ORDER — VENLAFAXINE HYDROCHLORIDE 37.5 MG/1
75 CAPSULE, EXTENDED RELEASE ORAL DAILY
Qty: 60 CAPSULE | Refills: 0 | OUTPATIENT
Start: 2020-02-05

## 2020-02-12 ENCOUNTER — LAB (OUTPATIENT)
Dept: LAB | Facility: CLINIC | Age: 30
End: 2020-02-12
Payer: COMMERCIAL

## 2020-02-12 ENCOUNTER — TELEPHONE (OUTPATIENT)
Dept: FAMILY MEDICINE CLINIC | Facility: CLINIC | Age: 30
End: 2020-02-12

## 2020-02-12 ENCOUNTER — TRANSCRIBE ORDERS (OUTPATIENT)
Dept: LAB | Facility: CLINIC | Age: 30
End: 2020-02-12

## 2020-02-12 DIAGNOSIS — F43.23 ADJUSTMENT DISORDER WITH MIXED ANXIETY AND DEPRESSED MOOD: ICD-10-CM

## 2020-02-12 DIAGNOSIS — F41.9 ANXIETY: ICD-10-CM

## 2020-02-12 DIAGNOSIS — F33.9 EPISODE OF RECURRENT MAJOR DEPRESSIVE DISORDER, UNSPECIFIED DEPRESSION EPISODE SEVERITY (HCC): ICD-10-CM

## 2020-02-12 LAB
ALBUMIN SERPL BCP-MCNC: 3.9 G/DL (ref 3.5–5)
ALP SERPL-CCNC: 86 U/L (ref 46–116)
ALT SERPL W P-5'-P-CCNC: 28 U/L (ref 12–78)
ANION GAP SERPL CALCULATED.3IONS-SCNC: 11 MMOL/L (ref 4–13)
AST SERPL W P-5'-P-CCNC: 11 U/L (ref 5–45)
BILIRUB SERPL-MCNC: <0.1 MG/DL (ref 0.2–1)
BUN SERPL-MCNC: 18 MG/DL (ref 5–25)
CALCIUM SERPL-MCNC: 8.9 MG/DL (ref 8.3–10.1)
CHLORIDE SERPL-SCNC: 105 MMOL/L (ref 100–108)
CO2 SERPL-SCNC: 27 MMOL/L (ref 21–32)
CREAT SERPL-MCNC: 0.76 MG/DL (ref 0.6–1.3)
GFR SERPL CREATININE-BSD FRML MDRD: 108 ML/MIN/1.73SQ M
GLUCOSE SERPL-MCNC: 83 MG/DL (ref 65–140)
POTASSIUM SERPL-SCNC: 3.8 MMOL/L (ref 3.5–5.3)
PROT SERPL-MCNC: 8 G/DL (ref 6.4–8.2)
SODIUM SERPL-SCNC: 143 MMOL/L (ref 136–145)

## 2020-02-12 PROCEDURE — 36415 COLL VENOUS BLD VENIPUNCTURE: CPT

## 2020-02-12 PROCEDURE — 80053 COMPREHEN METABOLIC PANEL: CPT

## 2020-02-12 NOTE — PROGRESS NOTES
Assessment/Plan:  Problem List Items Addressed This Visit        Other    Class 1 obesity without serious comorbidity with body mass index (BMI) of 34 0 to 34 9 in adult     Worsening  Recommend lifestyle modifications  Anxiety - Primary     Improved  Increase Effexor XR 150mg QD  Continue counseling  Relevant Medications    venlafaxine (EFFEXOR-XR) 150 mg 24 hr capsule    Other Relevant Orders    Comprehensive metabolic panel    Episode of recurrent major depressive disorder ([address] )     Improved  Increase Effexor XR 150mg QD  Continue counseling  Relevant Medications    venlafaxine (EFFEXOR-XR) 150 mg 24 hr capsule    Other Relevant Orders    Comprehensive metabolic panel    Vitamin D deficiency     Continue multivitamin and over-the-counter vitamin D3 3000 daily  Relevant Orders    Comprehensive metabolic panel      Other Visit Diagnoses     Rotator cuff strain, left, initial encounter        Relevant Orders    Ambulatory referral to Physical Therapy    Home Exercise Program given  Motrin/ Tylenol PRN  Acute pain of left shoulder        Relevant Orders    Ambulatory referral to Physical Therapy    See above  Paresthesias        Relevant Orders    Ambulatory referral to Physical Therapy    Likely due to nerve compression from rotator cuff strain  See above  Screening for HIV (human immunodeficiency virus)        Relevant Orders    HIV 1/2 AG-AB combo           Return in about 6 weeks (around 3/30/2020) for Recheck Anx/Dep, Adjust D/O, Obesity, Labs         Future Appointments   Date Time Provider Samuel [place]   4/3/2020  8:40 AM Nikhil Alves DO [facility]        Subjective:     Sallie Gallegos is a 34 y o  female who presents today for a follow-up on her chronic medical conditions  HPI:  Chief Complaint   Patient presents with    Follow-up     Anxiety f/u   Shoulder Pain     Has left sided shoulder pain since Saturday   Believes it is a pinched nerve, travels down to left arm       -- Above per clinical staff and reviewed  --      HPI      Today:      Return in about 6 weeks (around 12/6/2019) for Recheck Anx/Dep, Adjust D/O, Obesity, Labs  Left Shoulder Pain - Symptoms x 2 days  Unsure if she hurt it at work  She moved a strange way and did not report work injury as she was not active  Felt a pinch in her shoulder, and then a tingle in LUE  Felt a know in her left trapezius, and generalized tingling in LUE into L hand  L hand feels colder than R hand  Stretching temporarily helpful, but worse c some movements  Dull achy, pain  Currently 1/10, Max 3/10  Lasts for 5 minutes after resting, but continues if she still keeps using LUE   +Right-handed  Denies injury  Worse c abduction above 90 degrees and external rotation  Tylenol PRN helpful           Obesity - Watching diet - eating smaller portions, less junk food, no longer drinking soda, drinking more water  No Exercise - Walking 2 miles at [employer] a gym membership, just needs to find time to go        Vitamin D Deficiency - s/p Drisdol   Taking MVI and OTC Vitamin D 3,000IU daily          Adjustment Disorder - On Effexor XR 75mg QD x 3 5 months  Feels mood improved, but feels depression spurts in the past 1 5 weeks  Has unprovoked crying - occurs less often 2 times per week, lasting a shorter time period  D/C Cymbalta 60mg QD as anxiety improved, but depression is worsened   D/C Lexapro 20mg QD due to worsening anxiety   D/C Zoloft 50mg QD x 3 weeks due to worsening anxiety   Sees counselor montly - next appt 2/20, which was helpful    She is less quick to anger, less easily annoyed, has less unprovoked crying   Improved motivation   Feels that mood is 40-50% improved, but feels that anxiety is still uncontrolled   She is no longer having anxiety attacks daily over things that she thinks are not important   She was having 2-3 anxiety attacks daily (previously 6-9 attacks), lasting 2-3 minutes   Previously, anxiety attacks last a couple to 20-30 minutes   Anxiety provoked by famiy stress as she is  from her    Breathing exercises have been helpful   Symptoms since 2013 since birth of twin sons  No other mood meds or counseling previously   Good social supports   No SI/HI/AH/VH  She last thought about running about from her problems last week, but she would not do so  Her boyfriend provides emotional support        Insomnia - Improved with more regular sleep schedule   Can fall asleep and stay asleep  Previously could fall asleep after 1 hour, but sometimes awakens after 1 5-2 / 4 hours of sleep   Sleeping for 6-8 (previously 6) hours uninterrupted   Unsure of trigger   Has racing thoughts   Drinks 8-10oz caffeine daily   Denies snoring or witnessed apnea            PHQ-9 Depression Screening    PHQ-9:    Frequency of the following problems over the past two weeks:       Little interest or pleasure in doing things:  1 - several days  Feeling down, depressed, or hopeless:  2 - more than half the days  Trouble falling or staying asleep, or sleeping too much:  0 - not at all  Feeling tired or having little energy:  0 - not at all  Poor appetite or overeatin - not at all  Trouble concentrating on things, such as reading the newspaper or watching television:  0 - not at all  Moving or speaking so slowly that other people could have noticed  Or the opposite - being so fidgety or restless that you have been moving around a lot more than usual:  0 - not at all  Thoughts that you would be better off dead, or of hurting yourself in some way:  1 - several days  PHQ-2 Score:  3         BLADIMIR-7 Flowsheet Screening      Most Recent Value   Over the last two weeks, how often have you been bothered by the following problems?     Feeling nervous, anxious, or on edge  2   Not being able to stop or control worrying  1   Worrying too much about different things  1   Trouble relaxing 1   Being so restless that it's hard to sit still  0   Becoming easily annoyed or irritable   0   Feeling afraid as if something awful might happen  0   How difficult have these problems made it for you to do your work, take care of things at home, or get along with other people? Somewhat difficult   BLADIMIR Score   5                  From previous note:     Return if symptoms worsen or fail to improve, for Return in about 6 weeks (around 10/25/2019) for  Recheck Anx/Dep, Adjust D/O, Obesity, Labs     Did not complete labs   Plans to go 11/25/19        Obesity - Watching diet - eating smaller portions, less junk food, cut back to 10oz soda daily   No Exercise - Walking 2 miles at job  [place] to join a gym        Vitamin D Deficiency - s/p Christal   Taking MVI and OTC Vitamin D 3,000IU daily          Adjustment Disorder - On Effexor XR 75mg QD x 6 weeks   D/C Cymbalta 60mg QD due to worsening depression      D/C Lexapro 20mg QD and Zoloft 50mg QD due to worsening anxiety   Last saw counselor 8/19, which was helpful  She plans to schedule an appointment in the future    She is less quick to anger, less easily annoyed, has less unprovoked crying   Improved motivation   Feels that mood is 40% improved, but feels that anxiety is still uncontrolled   She is having anxiety attacks daily over things that she thinks are not important   She is having 2-3 anxiety attacks daily (previously 6-9 attacks), lasting 2-3 minutes   Previously, anxiety attacks last a couple to 20-30 minutes   Anxiety provoked by famiy stress as she is  from her    Breathing exercises have been helpful   Symptoms since 2013 since birth of twin sons   No other mood meds or counseling previously   Good social supports   No SI/HI/AH/VH        Insomnia - Intermittent   Can fall asleep after 1 hour, but sometimes awakens after 1 5-2 / 4 hours of sleep   Sleeping for 6 hours interrupted   Unsure of trigger   Has racing thoughts   Drinks 8-10oz caffeine daily   Denies snoring or witnessed apnea           PHQ-9 Depression Screening    PHQ-9:    Frequency of the following problems over the past two weeks:       Little interest or pleasure in doing things:  1 - several days  Feeling down, depressed, or hopeless:  1 - several days  Trouble falling or staying asleep, or sleeping too much:  3 - nearly every day  Feeling tired or having little energy:  2 - more than half the days  Poor appetite or overeatin - several days  Feeling bad about yourself - or that you are a failure or have let yourself or your family down:  1 - several days  Trouble concentrating on things, such as reading the newspaper or watching television:  0 - not at all  Moving or speaking so slowly that other people could have noticed  Or the opposite - being so fidgety or restless that you have been moving around a lot more than usual:  0 - not at all  Thoughts that you would be better off dead, or of hurting yourself in some way:  0 - not at all  PHQ-2 Score:  2  PHQ-9 Score:  9               BLADIMIR-7 Flowsheet Screening      Most Recent Value   Over the last two weeks, how often have you been bothered by the following problems?     Feeling nervous, anxious, or on edge  2   Not being able to stop or control worrying  2   Worrying too much about different things  1   Trouble relaxing   1   Being so restless that it's hard to sit still  1   Becoming easily annoyed or irritable   1   Feeling afraid as if something awful might happen  1   How difficult have these problems made it for you to do your work, take care of things at home, or get along with other people?   Somewhat difficult   BLADIMIR Score   9                   From previous note:     Return in about 6 weeks (around 2019) for Recheck Anx/Dep, Adjust D/O, Obesity, Labs      Did not complete labs        Obesity - Trying to watch diet - eating smaller portions   +Regular Exercise - Walking dogs 30 minutes, 7 days per week   Walking 3-4 mitul at job        Vitamin D Deficiency - s/p Drisdol   Not taking MVI or OTC Vitamin D         Adjustment Disorder - On Lexapro 20mg QD x 6 weeks   D/C Zoloft 50mg QD x 3 weeks due to worsening anxiety   Last saw counselor , which was helpful  She plans to schedule an appointment in the future    She is less quick to anger, less easily annoyed, has less unprovoked crying   Improved motivation   Feels that mood is 40% improved, but feels that anxiety is still uncontrolled   She is having anxiety attacks daily over things that she thinks are not important   She has anxiety attack prior to work despite liking her job and co-workers  Hunter Amaro attacks last a couple to 20-30 minutes   Breathing exercises have been helpful   Symptoms since  since birth of twin sons  No other mood meds or counseling previously   Good social supports   No SI/HI/AH/VH        Insomnia - Symptoms x 3 weeks   Can fall asleep easily, but awakens after 1 5-2 hours of sleep   Sleeping for 6 hours interrupted  Ethelda Gentle of trigger   Drinks 8-10oz caffeine daily   Denies snoring or witnessed apnea        PHQ-9 Depression Screening    PHQ-9:    Frequency of the following problems over the past two weeks:       Little interest or pleasure in doing things:  1 - several days  Feeling down, depressed, or hopeless:  1 - several days  Trouble falling or staying asleep, or sleeping too much:  3 - nearly every day  Feeling tired or having little energy:  1 - several days  Poor appetite or overeatin - several days  Feeling bad about yourself - or that you are a failure or have let yourself or your family down:  1 - several days  Trouble concentrating on things, such as reading the newspaper or watching television:  1 - several days  Moving or speaking so slowly that other people could have noticed   Or the opposite - being so fidgety or restless that you have been moving around a lot more than usual:  1 - several days  Thoughts that you would be better off dead, or of hurting yourself in some way:  1 - several days  PHQ-2 Score:  2  PHQ-9 Score:  11               BLADIMIR-7 Flowsheet Screening      Most Recent Value   Over the last two weeks, how often have you been bothered by the following problems? Feeling nervous, anxious, or on edge  2   Not being able to stop or control worrying  1   Worrying too much about different things  1   Trouble relaxing   1   Being so restless that it's hard to sit still  2   Becoming easily annoyed or irritable   1   Feeling afraid as if something awful might happen  1   How difficult have these problems made it for you to do your work, take care of things at home, or get along with other people?   Somewhat difficult   BLADIMIR Score   9          MDQ:  3     From previous note:     Obesity - Trying to watch diet   +Regular Exercise - Walking dogs 30 minutes, 7 days per week        Migraines - Occurs less often - 1-2 times per month, once weekly when younger  Kenroy Motley had migraines since 14yo   Using Aleve / Ibuprofen / Tylenol PRN, rest, helpful   B/L Ethmoid pain - feels like eye strain, can progress to achy, throbbing sensation   Currently 0/10, Max 5-6/10   Lasts 30-45 minutes to 2-3 hours   Denies aura   Sometimes has associated nausea   Denies photophobia and phonophobia   No migraine Rx previously          Adjustment Disorder - Symptoms since 2013 since birth of twin sons    "I'm all over the place mood-wise "  She feels she is quick to anger, easily annoyed, has unprovoked crying   No mood meds or counseling previously  [address] thoughts about leaving a few days ago, but does not have a plan   Would drive until she runs out of gas   No HI/AH/VH  Leisa Hernandez her job early 3/19 - no unemployement, has been applying to jobs s success  Donna Soto is also stress by finances   She is at home with her kids, laying on the couch, low motivation        PHQ-9 Depression Screening    PHQ-9:    Frequency of the following problems over the past two weeks:       Little interest or pleasure in doing things:  2 - more than half the days  Feeling down, depressed, or hopeless:  2 - more than half the days  Trouble falling or staying asleep, or sleeping too much:  3 - nearly every day  Feeling tired or having little energy:  2 - more than half the days  Poor appetite or overeating:  3 - nearly every day  Feeling bad about yourself - or that you are a failure or have let yourself or your family down:  3 - nearly every day  Trouble concentrating on things, such as reading the newspaper or watching television:  1 - several days  Moving or speaking so slowly that other people could have noticed  Or the opposite - being so fidgety or restless that you have been moving around a lot more than usual:  0 - not at all  Thoughts that you would be better off dead, or of hurting yourself in some way:  1 - several days  PHQ-2 Score:  4  PHQ-9 Score:  17               BLADIMIR-7 Flowsheet Screening      Most Recent Value   Over the last two weeks, how often have you been bothered by the following problems?     Feeling nervous, anxious, or on edge  1   Not being able to stop or control worrying  3   Worrying too much about different things  3   Trouble relaxing   2   Being so restless that it's hard to sit still  1   Becoming easily annoyed or irritable   1   Feeling afraid as if something awful might happen  1   How difficult have these problems made it for you to do your work, take care of things at home, or get along with other people?   Very difficult   BLADIMIR Score   12                Reviewed:  Labs 2/12/20,  Gyn 9/6/17     Sees [facility]   Next appt 9/18 - overdue     Unsure of Tdap status   [facility] Dr Tristian Bey delivered her children in 2013               The following portions of the patient's history were reviewed and updated as appropriate: allergies, current medications, past family history, past medical history, past social history, past surgical history and problem list       Review of Systems   Constitutional: Negative for appetite change, chills, diaphoresis, fatigue and fever  Respiratory: Negative for chest tightness and shortness of breath  Cardiovascular: Negative for chest pain  Gastrointestinal: Negative for abdominal pain, blood in stool, diarrhea, nausea and vomiting  Genitourinary: Negative for dysuria  Musculoskeletal: Positive for myalgias  Current Outpatient Medications   Medication Sig Dispense Refill    Cholecalciferol (VITAMIN D3) 3000 units TABS Take 1 tablet by mouth daily      Multiple Vitamin (MULTIVITAMIN) tablet Take 1 tablet by mouth daily      venlafaxine (EFFEXOR-XR) 150 mg 24 hr capsule Take 1 capsule (150 mg total) by mouth daily 30 capsule 1     No current facility-administered medications for this visit  Objective:  /70   Pulse 86   Temp 98 3 °F (36 8 °C) (Tympanic)   Resp 16   Ht 5' 1 02" (1 55 m)   Wt 83 4 kg (183 lb 12 8 oz)   LMP 01/01/2016 (Within Years)   SpO2 100%   BMI 34 71 kg/m²    Wt Readings from Last 3 Encounters:   02/17/20 83 4 kg (183 lb 12 8 oz)   10/25/19 80 5 kg (177 lb 6 4 oz)   09/20/19 79 4 kg (175 lb)      BP Readings from Last 3 Encounters:   02/17/20 118/70   10/25/19 112/76   09/20/19 106/62          Physical Exam   Constitutional: She is oriented to person, place, and time  She appears well-developed and well-nourished  HENT:   Head: Normocephalic and atraumatic  Eyes: Conjunctivae are normal    Neck: Normal range of motion  Neck supple  No spinous process tenderness and no muscular tenderness present  Normal range of motion present  Cardiovascular: Normal rate, regular rhythm, normal heart sounds and intact distal pulses  Pulmonary/Chest: Effort normal and breath sounds normal    Musculoskeletal: She exhibits no edema or tenderness  B/L Clavicles stable  Full AROM B/L shoulders  B/L UE N/V intact    No temperature differential UE  Neurological: She is alert and oriented to person, place, and time  Psychiatric: She has a normal mood and affect  Her behavior is normal  Judgment and thought content normal    Nursing note and vitals reviewed  Lab Results:      Lab Results   Component Value Date    WBC 9 01 06/04/2019    HGB 12 2 06/04/2019    HCT 38 5 06/04/2019     06/04/2019    TRIG 77 06/04/2019    HDL 48 06/04/2019    ALT 28 02/12/2020    AST 11 02/12/2020    K 3 8 02/12/2020     02/12/2020    CREATININE 0 76 02/12/2020    BUN 18 02/12/2020    CO2 27 02/12/2020    GLUF 90 06/04/2019    HGBA1C 5 3 06/04/2019     No results found for: URICACID  Invalid input(s): BASENAME Vitamin D    No results found  POCT Labs      BMI Counseling: Body mass index is 34 71 kg/m²  The BMI is above normal  Nutrition recommendations include decreasing portion sizes  Exercise recommendations include exercising 3-5 times per week  No pharmacotherapy was ordered

## 2020-02-12 NOTE — TELEPHONE ENCOUNTER
Labs received    Please call patient to schedule overdue appointment:    Return in about 6 weeks (around 12/6/2019) for Recheck Anx/Dep, Adjust D/O, Obesity, Labs

## 2020-02-17 ENCOUNTER — OFFICE VISIT (OUTPATIENT)
Dept: FAMILY MEDICINE CLINIC | Facility: CLINIC | Age: 30
End: 2020-02-17
Payer: COMMERCIAL

## 2020-02-17 VITALS
SYSTOLIC BLOOD PRESSURE: 118 MMHG | WEIGHT: 183.8 LBS | TEMPERATURE: 98.3 F | OXYGEN SATURATION: 100 % | HEART RATE: 86 BPM | DIASTOLIC BLOOD PRESSURE: 70 MMHG | HEIGHT: 61 IN | RESPIRATION RATE: 16 BRPM | BODY MASS INDEX: 34.7 KG/M2

## 2020-02-17 DIAGNOSIS — Z11.4 SCREENING FOR HIV (HUMAN IMMUNODEFICIENCY VIRUS): ICD-10-CM

## 2020-02-17 DIAGNOSIS — E55.9 VITAMIN D DEFICIENCY: ICD-10-CM

## 2020-02-17 DIAGNOSIS — F33.9 EPISODE OF RECURRENT MAJOR DEPRESSIVE DISORDER, UNSPECIFIED DEPRESSION EPISODE SEVERITY (HCC): ICD-10-CM

## 2020-02-17 DIAGNOSIS — F41.9 ANXIETY: Primary | ICD-10-CM

## 2020-02-17 DIAGNOSIS — R20.2 PARESTHESIAS: ICD-10-CM

## 2020-02-17 DIAGNOSIS — M25.512 ACUTE PAIN OF LEFT SHOULDER: ICD-10-CM

## 2020-02-17 DIAGNOSIS — S46.012A ROTATOR CUFF STRAIN, LEFT, INITIAL ENCOUNTER: ICD-10-CM

## 2020-02-17 DIAGNOSIS — E66.9 CLASS 1 OBESITY WITHOUT SERIOUS COMORBIDITY WITH BODY MASS INDEX (BMI) OF 34.0 TO 34.9 IN ADULT, UNSPECIFIED OBESITY TYPE: ICD-10-CM

## 2020-02-17 PROCEDURE — 1036F TOBACCO NON-USER: CPT | Performed by: FAMILY MEDICINE

## 2020-02-17 PROCEDURE — 99214 OFFICE O/P EST MOD 30 MIN: CPT | Performed by: FAMILY MEDICINE

## 2020-02-17 PROCEDURE — 3008F BODY MASS INDEX DOCD: CPT | Performed by: FAMILY MEDICINE

## 2020-02-17 RX ORDER — VENLAFAXINE HYDROCHLORIDE 150 MG/1
150 CAPSULE, EXTENDED RELEASE ORAL DAILY
Qty: 30 CAPSULE | Refills: 1 | Status: SHIPPED | OUTPATIENT
Start: 2020-02-17 | End: 2020-04-03 | Stop reason: SDUPTHER

## 2020-02-17 NOTE — PATIENT INSTRUCTIONS
You may use Tylenol (Acetaminophen) up to 3,000mg daily (in 24 hours) as needed for pain or fever  You may use Motrin (Ibuprofen) up to 800mg 3 times daily with food (in 24 hours) as needed for pain or fever

## 2020-03-11 DIAGNOSIS — F41.9 ANXIETY: ICD-10-CM

## 2020-03-11 DIAGNOSIS — F33.9 EPISODE OF RECURRENT MAJOR DEPRESSIVE DISORDER, UNSPECIFIED DEPRESSION EPISODE SEVERITY (HCC): ICD-10-CM

## 2020-03-11 RX ORDER — VENLAFAXINE HYDROCHLORIDE 150 MG/1
CAPSULE, EXTENDED RELEASE ORAL
Qty: 30 CAPSULE | Refills: 1 | OUTPATIENT
Start: 2020-03-11

## 2020-04-03 ENCOUNTER — TELEMEDICINE (OUTPATIENT)
Dept: FAMILY MEDICINE CLINIC | Facility: CLINIC | Age: 30
End: 2020-04-03
Payer: COMMERCIAL

## 2020-04-03 VITALS — HEART RATE: 96 BPM | BODY MASS INDEX: 34.55 KG/M2 | HEIGHT: 61 IN | WEIGHT: 183 LBS | TEMPERATURE: 97 F

## 2020-04-03 DIAGNOSIS — E55.9 VITAMIN D DEFICIENCY: ICD-10-CM

## 2020-04-03 DIAGNOSIS — F41.9 ANXIETY: Primary | ICD-10-CM

## 2020-04-03 DIAGNOSIS — F33.9 EPISODE OF RECURRENT MAJOR DEPRESSIVE DISORDER, UNSPECIFIED DEPRESSION EPISODE SEVERITY (HCC): ICD-10-CM

## 2020-04-03 DIAGNOSIS — E66.9 CLASS 1 OBESITY WITHOUT SERIOUS COMORBIDITY WITH BODY MASS INDEX (BMI) OF 34.0 TO 34.9 IN ADULT, UNSPECIFIED OBESITY TYPE: ICD-10-CM

## 2020-04-03 PROCEDURE — 99213 OFFICE O/P EST LOW 20 MIN: CPT | Performed by: FAMILY MEDICINE

## 2020-04-03 RX ORDER — VENLAFAXINE HYDROCHLORIDE 150 MG/1
150 CAPSULE, EXTENDED RELEASE ORAL DAILY
Qty: 30 CAPSULE | Refills: 1 | Status: SHIPPED | OUTPATIENT
Start: 2020-04-03 | End: 2020-05-15 | Stop reason: SDUPTHER

## 2020-04-03 RX ORDER — VENLAFAXINE HYDROCHLORIDE 75 MG/1
75 CAPSULE, EXTENDED RELEASE ORAL DAILY
Qty: 30 CAPSULE | Refills: 1 | Status: SHIPPED | OUTPATIENT
Start: 2020-04-03 | End: 2020-05-15 | Stop reason: SDUPTHER

## 2020-04-09 DIAGNOSIS — F41.9 ANXIETY: ICD-10-CM

## 2020-04-09 DIAGNOSIS — F33.9 EPISODE OF RECURRENT MAJOR DEPRESSIVE DISORDER, UNSPECIFIED DEPRESSION EPISODE SEVERITY (HCC): ICD-10-CM

## 2020-04-09 RX ORDER — VENLAFAXINE HYDROCHLORIDE 150 MG/1
CAPSULE, EXTENDED RELEASE ORAL
Qty: 30 CAPSULE | Refills: 1 | OUTPATIENT
Start: 2020-04-09

## 2020-04-25 DIAGNOSIS — F41.9 ANXIETY: ICD-10-CM

## 2020-04-25 DIAGNOSIS — F33.9 EPISODE OF RECURRENT MAJOR DEPRESSIVE DISORDER, UNSPECIFIED DEPRESSION EPISODE SEVERITY (HCC): ICD-10-CM

## 2020-04-27 RX ORDER — VENLAFAXINE HYDROCHLORIDE 75 MG/1
75 CAPSULE, EXTENDED RELEASE ORAL DAILY
Qty: 30 CAPSULE | Refills: 1 | OUTPATIENT
Start: 2020-04-27

## 2020-05-09 DIAGNOSIS — F33.9 EPISODE OF RECURRENT MAJOR DEPRESSIVE DISORDER, UNSPECIFIED DEPRESSION EPISODE SEVERITY (HCC): ICD-10-CM

## 2020-05-09 DIAGNOSIS — F41.9 ANXIETY: ICD-10-CM

## 2020-05-11 ENCOUNTER — PATIENT MESSAGE (OUTPATIENT)
Dept: FAMILY MEDICINE CLINIC | Facility: CLINIC | Age: 30
End: 2020-05-11

## 2020-05-11 RX ORDER — VENLAFAXINE HYDROCHLORIDE 150 MG/1
150 CAPSULE, EXTENDED RELEASE ORAL DAILY
Qty: 30 CAPSULE | Refills: 1 | OUTPATIENT
Start: 2020-05-11

## 2020-05-14 ENCOUNTER — LAB (OUTPATIENT)
Dept: LAB | Facility: CLINIC | Age: 30
End: 2020-05-14
Payer: COMMERCIAL

## 2020-05-14 DIAGNOSIS — F33.9 EPISODE OF RECURRENT MAJOR DEPRESSIVE DISORDER, UNSPECIFIED DEPRESSION EPISODE SEVERITY (HCC): ICD-10-CM

## 2020-05-14 DIAGNOSIS — E55.9 VITAMIN D DEFICIENCY: ICD-10-CM

## 2020-05-14 DIAGNOSIS — Z11.4 SCREENING FOR HIV (HUMAN IMMUNODEFICIENCY VIRUS): ICD-10-CM

## 2020-05-14 DIAGNOSIS — F41.9 ANXIETY: ICD-10-CM

## 2020-05-14 LAB
ALBUMIN SERPL BCP-MCNC: 3.8 G/DL (ref 3.5–5)
ALP SERPL-CCNC: 85 U/L (ref 46–116)
ALT SERPL W P-5'-P-CCNC: 48 U/L (ref 12–78)
ANION GAP SERPL CALCULATED.3IONS-SCNC: 8 MMOL/L (ref 4–13)
AST SERPL W P-5'-P-CCNC: 18 U/L (ref 5–45)
BILIRUB SERPL-MCNC: 0.2 MG/DL (ref 0.2–1)
BUN SERPL-MCNC: 13 MG/DL (ref 5–25)
CALCIUM SERPL-MCNC: 8.9 MG/DL (ref 8.3–10.1)
CHLORIDE SERPL-SCNC: 102 MMOL/L (ref 100–108)
CO2 SERPL-SCNC: 26 MMOL/L (ref 21–32)
CREAT SERPL-MCNC: 0.77 MG/DL (ref 0.6–1.3)
GFR SERPL CREATININE-BSD FRML MDRD: 106 ML/MIN/1.73SQ M
GLUCOSE P FAST SERPL-MCNC: 97 MG/DL (ref 65–99)
POTASSIUM SERPL-SCNC: 4.1 MMOL/L (ref 3.5–5.3)
PROT SERPL-MCNC: 7.9 G/DL (ref 6.4–8.2)
SODIUM SERPL-SCNC: 136 MMOL/L (ref 136–145)

## 2020-05-14 PROCEDURE — 87389 HIV-1 AG W/HIV-1&-2 AB AG IA: CPT

## 2020-05-14 PROCEDURE — 80053 COMPREHEN METABOLIC PANEL: CPT

## 2020-05-14 PROCEDURE — 36415 COLL VENOUS BLD VENIPUNCTURE: CPT

## 2020-05-15 ENCOUNTER — TELEMEDICINE (OUTPATIENT)
Dept: FAMILY MEDICINE CLINIC | Facility: CLINIC | Age: 30
End: 2020-05-15
Payer: COMMERCIAL

## 2020-05-15 VITALS — TEMPERATURE: 97.3 F | WEIGHT: 183 LBS | BODY MASS INDEX: 34.55 KG/M2 | HEIGHT: 61 IN

## 2020-05-15 DIAGNOSIS — E55.9 VITAMIN D DEFICIENCY: ICD-10-CM

## 2020-05-15 DIAGNOSIS — F41.9 ANXIETY: Primary | ICD-10-CM

## 2020-05-15 DIAGNOSIS — F33.9 EPISODE OF RECURRENT MAJOR DEPRESSIVE DISORDER, UNSPECIFIED DEPRESSION EPISODE SEVERITY (HCC): ICD-10-CM

## 2020-05-15 DIAGNOSIS — E66.9 CLASS 1 OBESITY WITHOUT SERIOUS COMORBIDITY WITH BODY MASS INDEX (BMI) OF 34.0 TO 34.9 IN ADULT, UNSPECIFIED OBESITY TYPE: ICD-10-CM

## 2020-05-15 LAB — HIV 1+2 AB+HIV1 P24 AG SERPL QL IA: NORMAL

## 2020-05-15 PROCEDURE — 99214 OFFICE O/P EST MOD 30 MIN: CPT | Performed by: FAMILY MEDICINE

## 2020-05-15 RX ORDER — BUSPIRONE HYDROCHLORIDE 7.5 MG/1
TABLET ORAL
Qty: 90 TABLET | Refills: 1 | Status: SHIPPED | OUTPATIENT
Start: 2020-05-15 | End: 2020-07-06 | Stop reason: SDUPTHER

## 2020-05-15 RX ORDER — VENLAFAXINE HYDROCHLORIDE 150 MG/1
150 CAPSULE, EXTENDED RELEASE ORAL DAILY
Qty: 90 CAPSULE | Refills: 2 | Status: SHIPPED | OUTPATIENT
Start: 2020-05-15 | End: 2020-10-26 | Stop reason: SDUPTHER

## 2020-05-15 RX ORDER — VENLAFAXINE HYDROCHLORIDE 75 MG/1
75 CAPSULE, EXTENDED RELEASE ORAL DAILY
Qty: 90 CAPSULE | Refills: 2 | Status: SHIPPED | OUTPATIENT
Start: 2020-05-15 | End: 2020-10-26 | Stop reason: SDUPTHER

## 2020-06-08 ENCOUNTER — TELEMEDICINE (OUTPATIENT)
Dept: FAMILY MEDICINE CLINIC | Facility: CLINIC | Age: 30
End: 2020-06-08
Payer: COMMERCIAL

## 2020-06-08 ENCOUNTER — PATIENT MESSAGE (OUTPATIENT)
Dept: FAMILY MEDICINE CLINIC | Facility: CLINIC | Age: 30
End: 2020-06-08

## 2020-06-08 VITALS — TEMPERATURE: 97.3 F | BODY MASS INDEX: 34.55 KG/M2 | HEIGHT: 61 IN | WEIGHT: 183 LBS

## 2020-06-08 DIAGNOSIS — E66.9 CLASS 1 OBESITY WITHOUT SERIOUS COMORBIDITY WITH BODY MASS INDEX (BMI) OF 34.0 TO 34.9 IN ADULT, UNSPECIFIED OBESITY TYPE: ICD-10-CM

## 2020-06-08 DIAGNOSIS — F33.9 EPISODE OF RECURRENT MAJOR DEPRESSIVE DISORDER, UNSPECIFIED DEPRESSION EPISODE SEVERITY (HCC): Primary | ICD-10-CM

## 2020-06-08 DIAGNOSIS — F41.9 ANXIETY: ICD-10-CM

## 2020-06-08 DIAGNOSIS — F33.9 EPISODE OF RECURRENT MAJOR DEPRESSIVE DISORDER, UNSPECIFIED DEPRESSION EPISODE SEVERITY (HCC): ICD-10-CM

## 2020-06-08 DIAGNOSIS — G47.09 OTHER INSOMNIA: ICD-10-CM

## 2020-06-08 PROCEDURE — 3008F BODY MASS INDEX DOCD: CPT | Performed by: FAMILY MEDICINE

## 2020-06-08 PROCEDURE — 1036F TOBACCO NON-USER: CPT | Performed by: FAMILY MEDICINE

## 2020-06-08 PROCEDURE — 99214 OFFICE O/P EST MOD 30 MIN: CPT | Performed by: FAMILY MEDICINE

## 2020-06-08 RX ORDER — TRAZODONE HYDROCHLORIDE 100 MG/1
TABLET ORAL
Qty: 30 TABLET | Refills: 1 | Status: SHIPPED | OUTPATIENT
Start: 2020-06-08 | End: 2020-07-06 | Stop reason: SDUPTHER

## 2020-06-08 RX ORDER — BUSPIRONE HYDROCHLORIDE 7.5 MG/1
TABLET ORAL
Qty: 90 TABLET | Refills: 1 | OUTPATIENT
Start: 2020-06-08

## 2020-06-16 ENCOUNTER — PATIENT MESSAGE (OUTPATIENT)
Dept: FAMILY MEDICINE CLINIC | Facility: CLINIC | Age: 30
End: 2020-06-16

## 2020-06-17 ENCOUNTER — PATIENT MESSAGE (OUTPATIENT)
Dept: FAMILY MEDICINE CLINIC | Facility: CLINIC | Age: 30
End: 2020-06-17

## 2020-06-18 ENCOUNTER — TELEPHONE (OUTPATIENT)
Dept: OBGYN CLINIC | Facility: CLINIC | Age: 30
End: 2020-06-18

## 2020-06-19 ENCOUNTER — TRANSCRIBE ORDERS (OUTPATIENT)
Dept: INTERNAL MEDICINE CLINIC | Facility: CLINIC | Age: 30
End: 2020-06-19

## 2020-06-19 ENCOUNTER — OFFICE VISIT (OUTPATIENT)
Dept: OBGYN CLINIC | Facility: CLINIC | Age: 30
End: 2020-06-19
Payer: COMMERCIAL

## 2020-06-19 ENCOUNTER — TELEPHONE (OUTPATIENT)
Dept: OBGYN CLINIC | Facility: CLINIC | Age: 30
End: 2020-06-19

## 2020-06-19 VITALS
SYSTOLIC BLOOD PRESSURE: 126 MMHG | HEIGHT: 61 IN | TEMPERATURE: 99.1 F | BODY MASS INDEX: 38.61 KG/M2 | DIASTOLIC BLOOD PRESSURE: 72 MMHG | WEIGHT: 204.5 LBS

## 2020-06-19 DIAGNOSIS — R39.89 BLADDER PAIN: ICD-10-CM

## 2020-06-19 DIAGNOSIS — R39.89 BLADDER PAIN: Primary | ICD-10-CM

## 2020-06-19 DIAGNOSIS — R10.2 PELVIC PAIN: Primary | ICD-10-CM

## 2020-06-19 PROCEDURE — 3008F BODY MASS INDEX DOCD: CPT | Performed by: OBSTETRICS & GYNECOLOGY

## 2020-06-19 PROCEDURE — 99203 OFFICE O/P NEW LOW 30 MIN: CPT | Performed by: OBSTETRICS & GYNECOLOGY

## 2020-06-19 PROCEDURE — 4004F PT TOBACCO SCREEN RCVD TLK: CPT | Performed by: OBSTETRICS & GYNECOLOGY

## 2020-06-19 RX ORDER — PHENAZOPYRIDINE HYDROCHLORIDE 100 MG/1
100 TABLET, FILM COATED ORAL 3 TIMES DAILY PRN
Qty: 10 TABLET | Refills: 0 | Status: SHIPPED | OUTPATIENT
Start: 2020-06-19 | End: 2020-07-06 | Stop reason: ALTCHOICE

## 2020-06-19 RX ORDER — HYDROXYZINE PAMOATE 25 MG/1
25 CAPSULE ORAL
Qty: 30 CAPSULE | Refills: 0 | Status: SHIPPED | OUTPATIENT
Start: 2020-06-19 | End: 2020-07-11 | Stop reason: SDUPTHER

## 2020-06-19 RX ORDER — CEPHALEXIN 500 MG/1
CAPSULE ORAL
COMMUNITY
Start: 2020-06-15 | End: 2020-06-29

## 2020-06-25 ENCOUNTER — HOSPITAL ENCOUNTER (OUTPATIENT)
Dept: ULTRASOUND IMAGING | Facility: HOSPITAL | Age: 30
Discharge: HOME | End: 2020-06-25
Attending: OBSTETRICS & GYNECOLOGY
Payer: COMMERCIAL

## 2020-06-25 DIAGNOSIS — R10.2 PELVIC PAIN: ICD-10-CM

## 2020-06-25 PROCEDURE — 76856 US EXAM PELVIC COMPLETE: CPT

## 2020-06-25 PROCEDURE — 76830 TRANSVAGINAL US NON-OB: CPT

## 2020-06-30 ENCOUNTER — LAB (OUTPATIENT)
Dept: LAB | Facility: CLINIC | Age: 30
End: 2020-06-30
Payer: COMMERCIAL

## 2020-06-30 ENCOUNTER — TELEPHONE (OUTPATIENT)
Dept: FAMILY MEDICINE CLINIC | Facility: CLINIC | Age: 30
End: 2020-06-30

## 2020-06-30 DIAGNOSIS — F41.9 ANXIETY: ICD-10-CM

## 2020-06-30 DIAGNOSIS — E55.9 VITAMIN D DEFICIENCY: ICD-10-CM

## 2020-06-30 DIAGNOSIS — F33.9 EPISODE OF RECURRENT MAJOR DEPRESSIVE DISORDER, UNSPECIFIED DEPRESSION EPISODE SEVERITY (HCC): ICD-10-CM

## 2020-06-30 DIAGNOSIS — G47.09 OTHER INSOMNIA: ICD-10-CM

## 2020-06-30 LAB
ALBUMIN SERPL BCP-MCNC: 3.7 G/DL (ref 3.5–5)
ALP SERPL-CCNC: 92 U/L (ref 46–116)
ALT SERPL W P-5'-P-CCNC: 36 U/L (ref 12–78)
ANION GAP SERPL CALCULATED.3IONS-SCNC: 5 MMOL/L (ref 4–13)
AST SERPL W P-5'-P-CCNC: 18 U/L (ref 5–45)
BILIRUB SERPL-MCNC: 0.16 MG/DL (ref 0.2–1)
BUN SERPL-MCNC: 13 MG/DL (ref 5–25)
CALCIUM SERPL-MCNC: 8.9 MG/DL (ref 8.3–10.1)
CHLORIDE SERPL-SCNC: 105 MMOL/L (ref 100–108)
CO2 SERPL-SCNC: 28 MMOL/L (ref 21–32)
CREAT SERPL-MCNC: 0.95 MG/DL (ref 0.6–1.3)
GFR SERPL CREATININE-BSD FRML MDRD: 82 ML/MIN/1.73SQ M
GLUCOSE SERPL-MCNC: 91 MG/DL (ref 65–140)
POTASSIUM SERPL-SCNC: 4.7 MMOL/L (ref 3.5–5.3)
PROT SERPL-MCNC: 7.9 G/DL (ref 6.4–8.2)
SODIUM SERPL-SCNC: 138 MMOL/L (ref 136–145)

## 2020-06-30 PROCEDURE — 36415 COLL VENOUS BLD VENIPUNCTURE: CPT

## 2020-06-30 PROCEDURE — 80053 COMPREHEN METABOLIC PANEL: CPT

## 2020-06-30 RX ORDER — TRAZODONE HYDROCHLORIDE 100 MG/1
TABLET ORAL
Qty: 30 TABLET | Refills: 1 | OUTPATIENT
Start: 2020-06-30

## 2020-07-01 NOTE — PROGRESS NOTES
Assessment/Plan:  Problem List Items Addressed This Visit        Other    Class 2 obesity without serious comorbidity with body mass index (BMI) of 39 0 to 39 9 in adult     Worsening  Recommend lifestyle modifications  Relevant Orders    Lipid panel    Hemoglobin A1C    Anxiety     Improved on Effexor  mg daily and BuSpar 7 5 mg t i d  P r n  Relevant Medications    busPIRone (BUSPAR) 7 5 mg tablet    Other Relevant Orders    CBC and differential    TSH, 3rd generation with Free T4 reflex    Episode of recurrent major depressive disorder (HCC)     Improved on Effexor  mg daily  Relevant Medications    busPIRone (BUSPAR) 7 5 mg tablet    traZODone (DESYREL) 100 mg tablet    Other Relevant Orders    CBC and differential    TSH, 3rd generation with Free T4 reflex    Vitamin D deficiency     Pending labs  Continue multivitamin over-the-counter vitamin-D 3000 units daily  Relevant Orders    Comprehensive metabolic panel    Vitamin D 25 hydroxy    Other insomnia     Improved on Trazodone 100mg QHS  Monitor while patient returns to 3rd shift work  Relevant Medications    traZODone (DESYREL) 100 mg tablet    Other Relevant Orders    Comprehensive metabolic panel    TSH, 3rd generation with Free T4 reflex      Other Visit Diagnoses     Annual physical exam    -  Primary    Pelvic pain      Improved on Vistaril per Gyn  Pending Uro consult tomorrow  Screening for diabetes mellitus        Relevant Orders    Hemoglobin A1C    Need for vaccination        Relevant Orders    PNEUMOCOCCAL POLYSACCHARIDE VACCINE 23-VALENT =>1YO SQ IM (Completed)    Screening for cardiovascular condition        Relevant Orders    CBC and differential    Comprehensive metabolic panel    Lipid panel    LDL cholesterol, direct           Return in about 4 months (around 11/6/2020) for 4mo- Anx/Dep, Adjust D/O, Insomnia, Migraine, Vit D Def, Obesity, Labs        Future Appointments Date Time Provider Samuel Alamo   7/7/2020  9:15 AM [facility] UROLOGY SW [facility]   11/9/2020  8:00 AM Mojgan Aviles,  [facility]        Subjective:     Iman Root is a 34 y o  female who presents today for a follow-up on her chronic medical conditions  HPI:  Chief Complaint   Patient presents with    Physical Exam     6 week f/u, no concerns, will get penumovax      -- Above per clinical staff and reviewed  --      HPI      Today:      Return in about 6 weeks (around 6/26/2020) for Recheck Anx/Dep, Adjust D/O, Obesity, Labs    Physical    Pelvic Pain - Improved  Will see Uro tomorrow  S/p stable transvaginal U/S 6/25/20 and pelvic exam per Gyn Dr Myrtle Haines [birth year]  Taking Vistaril QHS for pelvic pain per Gyn            Obesity - Watching diet  - eating smaller portions, less junk food, no longer drinking soda, drinking more water   Feels as though she's gained water weight due to moo meds  +Exercise - Walking 30-45 minutes, 7 days per week, previously walking 2 miles at [employer], unsure if she wants to return post-COVID             Adjustment Disorder -Arun Johnson return to work tomorrow, 7/7/20, which excites her  On Buspar 7 5mg TID PRN - only taking BID, rarely TID x 3 weeks   Feels anxiety is improved  Feels anxious when she wears mask because she feels as though she cannot breathe  No longer having anxiety attacks at home  She is not focusing on her anxiety triggers  On increased Effexor XR 225mg QD    Last episode of unprovoked crying 5/20 - previously occurring more often -  2-3 times per week   D/C Cymbalta 60mg QD as anxiety improved, but depression is worsened   D/C Lexapro 20mg QD due to worsening anxiety   D/C Zoloft 50mg QD x 3 weeks due to worsening anxiety   Was seeing counselor monthly prior COVID-19 - last appt 2/20, which was helpful   She is less quick to anger, less easily annoyed, has less unprovoked crying   Improved motivation   She is no longer having anxiety attacks daily over things that she thinks are not important   She ws having 1-2 (previously 3-4) anxiety attacks per week, lasting 1-2 minutes - previously was having 2-3 anxiety attacks daily (previously 6-9 attacks), lasting 2-3 minutes   Previously, anxiety attacks last a couple to 20-30 minutes   Anxiety was provoked by famiy stress as she is  from her  - has filed divorce   They have a good relationship  Breathing exercises have been helpful   Symptoms since  since birth of twin sons  No other mood meds or counseling previously   Good social supports   No SI/HI/AH/VH       Insomnia - On Trazodone 100mg QHS x 3 weeks  Improved  She can sleep after 10-15 minutes, and can sleep without interruption for 8 hours  Unsure of trigger   Not currently working  [employer] erratic as she used to work 3rd shift prior to [employer]   She is used to working at night and sleeping during the day, but cannot do so due to watching her children   Previously, improved with more regular sleep schedule   Previously, took 2-3 hours to fall asleep, sleeps for 2-3 hours, then awakens   No longer drinks 8-10oz caffeine daily   Denies snoring or witnessed apnea   Melantonin 10mg QHS x 3 weeks was unhelpful for falling asleep and staying asleep             PHQ-9 Depression Screening    PHQ-9:    Frequency of the following problems over the past two weeks:       Little interest or pleasure in doing things:  1 - several days  Feeling down, depressed, or hopeless:  1 - several days  Trouble falling or staying asleep, or sleeping too much:  1 - several days  Feeling tired or having little energy:  1 - several days  Poor appetite or overeatin - several days  Feeling bad about yourself - or that you are a failure or have let yourself or your family down:  1 - several days  Trouble concentrating on things, such as reading the newspaper or watching television:  0 - not at all  Moving or speaking so slowly that other people could have noticed  Or the opposite - being so fidgety or restless that you have been moving around a lot more than usual:  0 - not at all  Thoughts that you would be better off dead, or of hurting yourself in some way:  0 - not at all  PHQ-2 Score:  2  PHQ-9 Score:  6         BLADIMIR-7 Flowsheet Screening      Most Recent Value   Over the last two weeks, how often have you been bothered by the following problems? Feeling nervous, anxious, or on edge  1   Not being able to stop or control worrying  1   Worrying too much about different things  1   Trouble relaxing   1   Being so restless that it's hard to sit still  0   Becoming easily annoyed or irritable   1   Feeling afraid as if something awful might happen  0   How difficult have these problems made it for you to do your work, take care of things at home, or get along with other people? Somewhat difficult   BLADIMIR Score   5          Vitamin D Deficiency - s/p Drisdol   Taking MVI and OTC Vitamin D 3,000IU daily  Migraines - Last migraine 5/20  Occurs less often - 1-2 times per month, once weekly when younger   Has had migraines since 12yo   Using Aleve / Ibuprofen / Tylenol PRN, rest, helpful   B/L Ethmoid pain - feels like eye strain, can progress to achy, throbbing sensation   Currently 0/10, Max 5-6/10   Lasts 30-45 minutes to 2-3 hours   Denies aura   Sometimes has associated nausea   Denies photophobia and phonophobia   No migraine Rx previously        Smoker - Smoking 4 cigarettes daily  Wants to quit cold turkey as she has done so previously    Unsure of quit date             From previous note:        F/U Insomnia        Obesity - Trying to watch diet  - eating smaller portions, less junk food, no longer drinking soda, drinking more water   +Exercise - Walking 30-45 minutes, 4 days per week, previously walking 2 miles at [employer]  [employer] Sutherland a gym membership, just needs to find time to go            Adjustment Disorder - On Buspar 7 5mg TID PRN - only taking BID x 3 weeks   On increased Effexor XR 225mg QD    Last episode of unprovoked crying 1 5 weeks ago - previously occurring more often -  2-3 times per week   D/C Cymbalta 60mg QD as anxiety improved, but depression is worsened   D/C Lexapro 20mg QD due to worsening anxiety   D/C Zoloft 50mg QD x 3 weeks due to worsening anxiety   Was seeing counselor monthly prior COVID-19 - last appt 2/20, which was helpful   She is less quick to anger, less easily annoyed, has less unprovoked crying   Improved motivation  Radha Arzate is no longer having anxiety attacks daily over things that she thinks are not important   She is having 1-2 (previously 3-4) anxiety attacks per week, lasting 1-2 minutes - previously was having 2-3 anxiety attacks daily (previously 6-9 attacks), lasting 2-3 minutes   Previously, anxiety attacks last a couple to 20-30 minutes   Anxiety provoked by famiy stress as she is  from her    Breathing exercises have been helpful   Symptoms since 2013 since birth of twin sons  No other mood meds or counseling previously   Good social supports   No SI/HI/AH/VH       Insomnia - Worsening x 4-5 weeks   Unsure of trigger  Arnoldo Calixto currently working  [employer] erratic as she used to work 3rd shift prior to [facility] [employer] [facility]   She is used to working at night and sleeping during the day, but cannot do so due to watching her children   Previously, improved with more regular sleep schedule   Takes 2-3 hours to fall asleep, sleeps for 2-3 hours, then awakens   No longer drinks 8-10oz caffeine daily   Denies snoring or witnessed apnea   Melantonin 10mg QHS x 3 weeks was unhelpful for falling asleep and staying asleep                PHQ-9 Depression Screening    PHQ-9:    Frequency of the following problems over the past two weeks:       Little interest or pleasure in doing things:  1 - several days  Feeling down, depressed, or hopeless:  1 - several days  Trouble falling or staying asleep, or sleeping too much:  3 - nearly every day  Feeling tired or having little energy:  3 - nearly every day  Poor appetite or overeatin - not at all  Feeling bad about yourself - or that you are a failure or have let yourself or your family down:  1 - several days  Trouble concentrating on things, such as reading the newspaper or watching television:  0 - not at all  Moving or speaking so slowly that other people could have noticed  Or the opposite - being so fidgety or restless that you have been moving around a lot more than usual:  0 - not at all  Thoughts that you would be better off dead, or of hurting yourself in some way:  0 - not at all  PHQ-2 Score:  2  PHQ-9 Score:  9                  BLADIMIR-7 Flowsheet Screening      Most Recent Value   Over the last two weeks, how often have you been bothered by the following problems?     Feeling nervous, anxious, or on edge  1   Not being able to stop or control worrying  1   Worrying too much about different things  1   Trouble relaxing   3   Being so restless that it's hard to sit still  1   Becoming easily annoyed or irritable   0   Feeling afraid as if something awful might happen  0   How difficult have these problems made it for you to do your work, take care of things at home, or get along with other people?   Somewhat difficult   BLADIMIR Score   7                   From previous note:        Return in about 6 weeks (around 5/15/2020) for Recheck Anx/Dep, Adjust D/O, Obesity, Labs         Obesity - Trying to watch diet  - eating smaller portions, less junk food, no longer drinking soda, drinking more water   +Exercise - Walking 30-45 minutes, 4 days per week, previously walking 2 miles at [employer] a gym membership, just needs to find time to go        Vitamin D Deficiency - s/p Drisdol   Taking MVI and OTC Vitamin D 3,000IU daily          Adjustment Disorder - On increased Effexor XR 225mg QD x 6 weeks   Feels mood 70% improved, has less anxiety and mental breakdown   Last episode of unprovoked crying 3-4 weeks ago - previously occurring more often -  2-3 times per week   D/C Cymbalta 60mg QD as anxiety improved, but depression is worsened   D/C Lexapro 20mg QD due to worsening anxiety   D/C Zoloft 50mg QD x 3 weeks due to worsening anxiety   Was seeing counselor monthly prior COVID-19 - last appt 2/20, which was helpful   She is less quick to anger, less easily annoyed, has less unprovoked crying   Improved motivation  Loren Bernal is no longer having anxiety attacks daily over things that she thinks are not important   She is having 1-2 (previously 3-4) anxiety attacks per week, lasting 1-2 minutes - previously was having 2-3 anxiety attacks daily (previously 6-9 attacks), lasting 2-3 minutes   Previously, anxiety attacks last a couple to 20-30 minutes   Anxiety provoked by famiy stress as she is  from her    Breathing exercises have been helpful   Symptoms since 2013 since birth of twin sons  No other mood meds or counseling previously   Good social supports   No SI/HI/AH/VH       Insomnia - Erratic as she used to work 3rd shift prior to COVID-19   She is used to working at night and sleeping during the day, but cannot do so due to watching her children   Previously, improved with more regular sleep schedule   Can fall asleep easily, but has difficulty staying asleep   Previously could fall asleep after 1 hour, but sometimes awakens after 1 5-2 / 4 hours of sleep   Sleeping for 6-7 (previously 6) hours, awakening 3-4 times per night   Unsure of trigger   No longer has racing thoughts   No longer drinks 8-10oz caffeine daily   Denies snoring or witnessed apnea   Melantonin 5mg helpful for falling asleep, but not staying asleep           PHQ-9 Depression Screening    PHQ-9:    Frequency of the following problems over the past two weeks:       Little interest or pleasure in doing things:  1 - several days  Feeling down, depressed, or hopeless:  0 - not at all  PHQ-2 Score:  1                  BLADIMIR-7 Flowsheet Screening      Most Recent Value   Over the last two weeks, how often have you been bothered by the following problems? Feeling nervous, anxious, or on edge  2   Not being able to stop or control worrying  2   Worrying too much about different things  1   Trouble relaxing   1   Being so restless that it's hard to sit still  1   Becoming easily annoyed or irritable   1   Feeling afraid as if something awful might happen  0   How difficult have these problems made it for you to do your work, take care of things at home, or get along with other people?   Somewhat difficult   BLADIMIR Score   8                      From previous note:     Return in about 6 weeks (around 3/30/2020) for Recheck Anx/Dep, Adjust D/O, Obesity, Labs     Did not complete Labs 2/17/20        Obesity - Watching diet - eating smaller portions, less junk food, no longer drinking soda, drinking more water   +Exercise - Walking 30-60 minutes, 5 days per week, previously walking 2 miles at [employer], just needs to find time to go        Vitamin D Deficiency - s/p Christal   Taking MVI and OTC Vitamin D 3,000IU daily          Adjustment Disorder - On increased Effexor XR 150mg QD x 6 weeks   Feels mood 60% improved, has some intermittent anxiety, but less severe and more controlled Re:  Anxiety and mood   Still has unprovoked crying - occurs less often 2-3 times per week   D/C Cymbalta 60mg QD as anxiety improved, but depression is worsened   D/C Lexapro 20mg QD due to worsening anxiety    D/C Zoloft 50mg QD x 3 weeks due to worsening anxiety   Was seeing counselor monthly prior COVID-19 - last appt 2/20, which was helpful   She is less quick to anger, less easily annoyed, has less unprovoked crying   Improved motivation  Triceisidro Little is no longer having anxiety attacks daily over things that she thinks are not important   She is having 3-4 anxiety attacks per week, lasting 1-2 minutes - previously was having 2-3 anxiety attacks daily (previously 6-9 attacks), lasting 2-3 minutes   Previously, anxiety attacks last a couple to 20-30 minutes   Anxiety provoked by famiy stress as she is  from her    Breathing exercises have been helpful   Symptoms since 2013 since birth of twin sons  No other mood meds or counseling previously   Good social supports   No SI/HI/AH/VH       Insomnia - Erratic as she used to work 3rd shift prior to COVID-19  She is used to working at night and sleeping during the day, but cannot do so due to watching her children   Previously, improved with more regular sleep schedule   Can fall asleep easily, but has difficulty staying asleep   Previously could fall asleep after 1 hour, but sometimes awakens after 1 5-2 / 4 hours of sleep   Sleeping for 6-7 (previously 6) hours, awakening 3-4 times per night   Unsure of trigger   No longer has racing thoughts   No longer drinks 8-10oz caffeine daily   Denies snoring or witnessed apnea   Melantonin 5mg helpful for falling asleep, but not staying asleep           PHQ-9 Depression Screening    PHQ-9:    Frequency of the following problems over the past two weeks:       Little interest or pleasure in doing things:  0 - not at all  Feeling down, depressed, or hopeless:  1 - several days  PHQ-2 Score:  1                  BLADIMIR-7 Flowsheet Screening      Most Recent Value   Over the last two weeks, how often have you been bothered by the following problems?     Feeling nervous, anxious, or on edge  1   Not being able to stop or control worrying  1   Worrying too much about different things  2   Trouble relaxing   0   Being so restless that it's hard to sit still  0   Becoming easily annoyed or irritable   1   Feeling afraid as if something awful might happen  0   How difficult have these problems made it for you to do your work, take care of things at home, or get along with other people?   Not difficult at all   BLADIMIR Score   5              From previous note:     Return in about 6 weeks (around 12/6/2019) for Recheck Anx/Dep, Adjust D/O, Obesity, Labs      Left Shoulder Pain - Symptoms x 2 days   Unsure if she hurt it at work  Familia Munoz moved a strange way and did not report work injury as she was not active   Felt a pinch in her shoulder, and then a tingle in LUE   Felt a know in her left trapezius, and generalized tingling in LUE into L hand   L hand feels colder than R hand   Stretching temporarily helpful, but worse c some movements   Dull achy, pain   Currently 1/10, Max 3/10   Lasts for 5 minutes after resting, but continues if she still keeps using LUE   +Right-handed   Denies injury   Worse c abduction above 90 degrees and external rotation   Tylenol PRN helpful           Obesity - Watching diet - eating smaller portions, less junk food, no longer drinking soda, drinking more water   No Exercise - Walking 2 miles at job  Rosa Elena Garcia a gym membership, just needs to find time to go        Vitamin D Deficiency - s/p Christal   Taking MVI and OTC Vitamin D 3,000IU daily          Adjustment Disorder - On Effexor XR 75mg QD x 3 5 months   Feels mood improved, but feels depression spurts in the past 1 5 weeks   Has unprovoked crying - occurs less often 2 times per week, lasting a shorter time period   D/C Cymbalta 60mg QD as anxiety improved, but depression is worsened   D/C Lexapro 20mg QD due to worsening anxiety    D/C Zoloft 50mg QD x 3 weeks due to worsening anxiety   Sees counselor montly - next appt 2/20, which was helpful   She is less quick to anger, less easily annoyed, has less unprovoked crying   Improved motivation   Feels that mood is 40-50% improved, but feels that anxiety is still uncontrolled   She is no longer having anxiety attacks daily over things that she thinks are not important   She was having 2-3 anxiety attacks daily (previously 6-9 attacks), lasting 2-3 minutes   Previously, anxiety attacks last a couple to 20-30 minutes   Anxiety provoked by famiy stress as she is  from her    Breathing exercises have been helpful   Symptoms since 2013 since birth of twin sons  No other mood meds or counseling previously   Good social supports   No SI/HI/AH/VH  Twila Villeda last thought about running about from her problems last week, but she would not do so  Daniel De La Torre boyfriend provides emotional support        Insomnia - Improved with more regular sleep schedule   Can fall asleep and stay asleep   Previously could fall asleep after 1 hour, but sometimes awakens after 1 5-2 / 4 hours of sleep   Sleeping for 6-8 (previously 6) hours uninterrupted   Unsure of trigger   Has racing thoughts   Drinks 8-10oz caffeine daily   Denies snoring or witnessed apnea            PHQ-9 Depression Screening    PHQ-9:    Frequency of the following problems over the past two weeks:       Little interest or pleasure in doing things:  1 - several days  Feeling down, depressed, or hopeless:  2 - more than half the days  Trouble falling or staying asleep, or sleeping too much:  0 - not at all  Feeling tired or having little energy:  0 - not at all  Poor appetite or overeatin - not at all  Trouble concentrating on things, such as reading the newspaper or watching television:  0 - not at all  Moving or speaking so slowly that other people could have noticed  Or the opposite - being so fidgety or restless that you have been moving around a lot more than usual:  0 - not at all  Thoughts that you would be better off dead, or of hurting yourself in some way:  1 - several days  PHQ-2 Score:  3                  BLADIMIR-7 Flowsheet Screening      Most Recent Value   Over the last two weeks, how often have you been bothered by the following problems?     Feeling nervous, anxious, or on edge  2   Not being able to stop or control worrying  1   Worrying too much about different things  1   Trouble relaxing   1   Being so restless that it's hard to sit still  0   Becoming easily annoyed or irritable   0   Feeling afraid as if something awful might happen  0   How difficult have these problems made it for you to do your work, take care of things at home, or get along with other people?   Somewhat difficult   BLADIMIR Score   5                      From previous note:     Return if symptoms worsen or fail to improve, for Return in about 6 weeks (around 10/25/2019) for  Recheck Anx/Dep, Adjust D/O, Obesity, Labs     Did not complete labs   Plans to go 11/25/19        Obesity - Watching diet - eating smaller portions, less junk food, cut back to 10oz soda daily   No Exercise - Walking 2 miles at [employer] to join a gym        Vitamin D Deficiency - s/p Drisdol   Taking MVI and OTC Vitamin D 3,000IU daily          Adjustment Disorder - On Effexor XR 75mg QD x 6 weeks   D/C Cymbalta 60mg QD due to worsening depression      D/C Lexapro 20mg QD and Zoloft 50mg QD due to worsening anxiety   Last saw counselor 8/19, which was helpful  She plans to schedule an appointment in the future    She is less quick to anger, less easily annoyed, has less unprovoked crying   Improved motivation   Feels that mood is 40% improved, but feels that anxiety is still uncontrolled   She is having anxiety attacks daily over things that she thinks are not important   She is having 2-3 anxiety attacks daily (previously 6-9 attacks), lasting 2-3 minutes   Previously, anxiety attacks last a couple to 20-30 minutes   Anxiety provoked by famiy stress as she is  from her    Breathing exercises have been helpful   Symptoms since 2013 since birth of twin sons  No other mood meds or counseling previously   Good social supports   No SI/HI/AH/VH        Insomnia - Intermittent   Can fall asleep after 1 hour, but sometimes awakens after 1 5-2 / 4 hours of sleep   Sleeping for 6 hours interrupted   Unsure of trigger   Has racing thoughts   Drinks 8-10oz caffeine daily   Denies snoring or witnessed apnea           PHQ-9 Depression Screening    PHQ-9:    Frequency of the following problems over the past two weeks:       Little interest or pleasure in doing things:  1 - several days  Feeling down, depressed, or hopeless:  1 - several days  Trouble falling or staying asleep, or sleeping too much:  3 - nearly every day  Feeling tired or having little energy:  2 - more than half the days  Poor appetite or overeatin - several days  Feeling bad about yourself - or that you are a failure or have let yourself or your family down:  1 - several days  Trouble concentrating on things, such as reading the newspaper or watching television:  0 - not at all  Moving or speaking so slowly that other people could have noticed  Or the opposite - being so fidgety or restless that you have been moving around a lot more than usual:  0 - not at all  Thoughts that you would be better off dead, or of hurting yourself in some way:  0 - not at all  PHQ-2 Score:  2  PHQ-9 Score:  9               BLADIMIR-7 Flowsheet Screening      Most Recent Value   Over the last two weeks, how often have you been bothered by the following problems?     Feeling nervous, anxious, or on edge  2   Not being able to stop or control worrying  2   Worrying too much about different things  1   Trouble relaxing   1   Being so restless that it's hard to sit still  1   Becoming easily annoyed or irritable   1   Feeling afraid as if something awful might happen  1   How difficult have these problems made it for you to do your work, take care of things at home, or get along with other people?   Somewhat difficult   BLADIMIR Score   9                   From previous note:     Return in about 6 weeks (around 2019) for Recheck Anx/Dep, Adjust D/O, Obesity, Labs      Did not complete labs        Obesity - Trying to watch diet - eating smaller portions   +Regular Exercise - Walking dogs 30 minutes, 7 days per week   Walking 3-4 miles at job        Vitamin D Deficiency - s/p Christal Thompson MVI and OTC Vitamin D 3,000IU daily           Adjustment Disorder - On Lexapro 20mg QD x 6 weeks   D/C Zoloft 50mg QD x 3 weeks due to worsening anxiety   Last saw counselor , which was helpful  She plans to schedule an appointment in the future    She is less quick to anger, less easily annoyed, has less unprovoked crying   Improved motivation   Feels that mood is 40% improved, but feels that anxiety is still uncontrolled   She is having anxiety attacks daily over things that she thinks are not important   She has anxiety attack prior to work despite liking her job and co-workers  [place] attacks last a couple to 20-30 minutes   Breathing exercises have been helpful   Symptoms since  since birth of twin sons  No other mood meds or counseling previously   Good social supports   No SI/HI/AH/VH        Insomnia - Symptoms x 3 weeks   Can fall asleep easily, but awakens after 1 5-2 hours of sleep   Sleeping for 6 hours interrupted  Arletha Duff of trigger   Drinks 8-10oz caffeine daily   Denies snoring or witnessed apnea        PHQ-9 Depression Screening    PHQ-9:    Frequency of the following problems over the past two weeks:       Little interest or pleasure in doing things:  1 - several days  Feeling down, depressed, or hopeless:  1 - several days  Trouble falling or staying asleep, or sleeping too much:  3 - nearly every day  Feeling tired or having little energy:  1 - several days  Poor appetite or overeatin - several days  Feeling bad about yourself - or that you are a failure or have let yourself or your family down:  1 - several days  Trouble concentrating on things, such as reading the newspaper or watching television:  1 - several days  Moving or speaking so slowly that other people could have noticed   Or the opposite - being so fidgety or restless that you have been moving around a lot more than usual:  1 - several days  Thoughts that you would be better off dead, or of hurting yourself in some way:  1 - several days  PHQ-2 Score:  2  PHQ-9 Score:  11               BLADIMIR-7 Flowsheet Screening      Most Recent Value   Over the last two weeks, how often have you been bothered by the following problems? Feeling nervous, anxious, or on edge  2   Not being able to stop or control worrying  1   Worrying too much about different things  1   Trouble relaxing   1   Being so restless that it's hard to sit still  2   Becoming easily annoyed or irritable   1   Feeling afraid as if something awful might happen  1   How difficult have these problems made it for you to do your work, take care of things at home, or get along with other people?   Somewhat difficult   BLADIMIR Score   9          MDQ:  3     From previous note:     Obesity - Trying to watch diet   +Regular Exercise - Walking dogs 30 minutes, 7 days per week        Migraines - Occurs less often - 1-2 times per month, once weekly when younger  Kimmy Sarmiento had migraines since 14yo   Using Aleve / Ibuprofen / Tylenol PRN, rest, helpful   B/L Ethmoid pain - feels like eye strain, can progress to achy, throbbing sensation   Currently 0/10, Max 5-6/10   Lasts 30-45 minutes to 2-3 hours   Denies aura   Sometimes has associated nausea   Denies photophobia and phonophobia   No migraine Rx previously          Adjustment Disorder - Symptoms since 2013 since birth of twin sons    "I'm all over the place mood-wise "  She feels she is quick to anger, easily annoyed, has unprovoked crying   No mood meds or counseling previously  [address] thoughts about leaving a few days ago, but does not have a plan   Would drive until she runs out of gas   No HI/AH/VH  Leonardo Lords her job early 3/19 - no unemployement, has been applying to jobs s success  Aman Moore is also stress by finances   She is at home with her kids, laying on the couch, low motivation        PHQ-9 Depression Screening    PHQ-9:    Frequency of the following problems over the past two weeks:       Little interest or pleasure in doing things:  2 - more than half the days  Feeling down, depressed, or hopeless:  2 - more than half the days  Trouble falling or staying asleep, or sleeping too much:  3 - nearly every day  Feeling tired or having little energy:  2 - more than half the days  Poor appetite or overeating:  3 - nearly every day  Feeling bad about yourself - or that you are a failure or have let yourself or your family down:  3 - nearly every day  Trouble concentrating on things, such as reading the newspaper or watching television:  1 - several days  Moving or speaking so slowly that other people could have noticed  Or the opposite - being so fidgety or restless that you have been moving around a lot more than usual:  0 - not at all  Thoughts that you would be better off dead, or of hurting yourself in some way:  1 - several days  PHQ-2 Score:  4  PHQ-9 Score:  17               BLADIMIR-7 Flowsheet Screening      Most Recent Value   Over the last two weeks, how often have you been bothered by the following problems? Feeling nervous, anxious, or on edge  1   Not being able to stop or control worrying  3   Worrying too much about different things  3   Trouble relaxing   2   Being so restless that it's hard to sit still  1   Becoming easily annoyed or irritable   1   Feeling afraid as if something awful might happen  1   How difficult have these problems made it for you to do your work, take care of things at home, or get along with other people?   Very difficult   BLADIMIR Score   12                Reviewed:  Labs 6/30/20,  Gyn 6/19/20     Sees [id number] [facility] appt 6/20?     Unsure of Tdap status   [facility] Dr Perla Perez delivered her children in 2013       Sees Dentist q6 months  Sees Optho q2 years          The following portions of the patient's history were reviewed and updated as appropriate: allergies, current medications, past family history, past medical history, past social history, past surgical history and problem list       Review of Systems   Constitutional: Negative for appetite change, chills, diaphoresis, fatigue and fever  Respiratory: Negative for chest tightness and shortness of breath  Cardiovascular: Negative for chest pain  Gastrointestinal: Negative for abdominal pain, blood in stool, diarrhea, nausea and vomiting  Genitourinary: Negative for dysuria and pelvic pain  Neurological: Negative for headaches  Current Outpatient Medications   Medication Sig Dispense Refill    busPIRone (BUSPAR) 7 5 mg tablet 1 pill by mouth twice daily, can take 3rd dose daily as needed for anxiety  270 tablet 2    Cholecalciferol (VITAMIN D3) 3000 units TABS Take 1 tablet by mouth daily      hydrOXYzine pamoate (VISTARIL) 25 mg capsule Take 1 capsule (25 mg total) by mouth daily at bedtime 30 capsule 0    Multiple Vitamin (MULTIVITAMIN) tablet Take 1 tablet by mouth daily      traZODone (DESYREL) 100 mg tablet Take 1 tablet (100 mg total) by mouth daily at bedtime 90 tablet 2    venlafaxine (EFFEXOR-XR) 150 mg 24 hr capsule Take 1 capsule (150 mg total) by mouth daily Take with 75mg cap for a total of 225mg daily  90 capsule 2    venlafaxine (EFFEXOR-XR) 75 mg 24 hr capsule Take 1 capsule (75 mg total) by mouth daily Take with 150mg cap for a total of 225mg daily  90 capsule 2     No current facility-administered medications for this visit  Objective:  /72   Pulse (!) 106   Temp 99 7 °F (37 6 °C) (Tympanic)   Resp 16   Ht 5' 1" (1 549 m)   Wt 94 3 kg (207 lb 12 8 oz)   LMP 01/01/2016 (Within Years)   SpO2 98%   BMI 39 26 kg/m²    Wt Readings from Last 3 Encounters:   07/06/20 94 3 kg (207 lb 12 8 oz)   06/19/20 92 8 kg (204 lb 8 oz)   06/08/20 83 kg (183 lb)      BP Readings from Last 3 Encounters:   07/06/20 116/72   06/19/20 126/72   02/17/20 118/70          Physical Exam   Constitutional: She is oriented to person, place, and time   She appears well-developed and well-nourished  HENT:   Head: Normocephalic and atraumatic  Right Ear: Tympanic membrane, external ear and ear canal normal    Left Ear: Tympanic membrane, external ear and ear canal normal    Nose: Nose normal  Right sinus exhibits no maxillary sinus tenderness and no frontal sinus tenderness  Left sinus exhibits no maxillary sinus tenderness and no frontal sinus tenderness  Mouth/Throat: Uvula is midline, oropharynx is clear and moist and mucous membranes are normal  No tonsillar exudate  Eyes: Conjunctivae are normal    Neck: Neck supple  No thyromegaly present  Cardiovascular: Normal rate, regular rhythm, normal heart sounds and intact distal pulses  Pulmonary/Chest: Effort normal and breath sounds normal    Abdominal: Soft  Bowel sounds are normal  She exhibits no distension and no mass  There is tenderness (Mild) in the suprapubic area  There is no rebound, no guarding and no CVA tenderness  Musculoskeletal: She exhibits no edema or tenderness  Lymphadenopathy:     She has no cervical adenopathy  Neurological: She is alert and oriented to person, place, and time  She displays normal reflexes  No cranial nerve deficit or sensory deficit  She exhibits normal muscle tone  Coordination normal    Skin: No rash noted  Psychiatric: She has a normal mood and affect  Her behavior is normal  Judgment and thought content normal    Nursing note and vitals reviewed        Lab Results:      Lab Results   Component Value Date    WBC 9 01 06/04/2019    HGB 12 2 06/04/2019    HCT 38 5 06/04/2019     06/04/2019    TRIG 77 06/04/2019    HDL 48 06/04/2019    ALT 36 06/30/2020    AST 18 06/30/2020    K 4 7 06/30/2020     06/30/2020    CREATININE 0 95 06/30/2020    BUN 13 06/30/2020    CO2 28 06/30/2020    GLUF 97 05/14/2020    HGBA1C 5 3 06/04/2019     No results found for: URICACID  Invalid input(s): BASENAME Vitamin D    Us Pelvis Complete W Transvaginal    Result Date: 6/29/2020  Narrative: PELVIC ULTRASOUND, COMPLETE INDICATION:  34years old  R10 2: Pelvic and perineal pain  COMPARISON: None TECHNIQUE:   Transabdominal pelvic ultrasound was performed in sagittal and transverse planes with a curvilinear transducer  Additional transvaginal imaging was performed to better evaluate the endometrium and ovaries  Imaging included volumetric sweeps as well as traditional still imaging technique  FINDINGS: UTERUS: Uterus is surgically absent  The vaginal cuff is unremarkable  OVARIES/ADNEXA: Right ovary:  2 8 x 3 x 2 1 cm  No suspicious right ovarian abnormality  There is a 2 3 cm dominant right ovarian follicle  Doppler flow within normal limits  Left ovary:  2 1 x 2 7 x 1 6 cm  No suspicious left ovarian abnormality  Doppler flow within normal limits  No suspicious adnexal mass or loculated collections  Trace simple free fluid in the pelvis  Impression: 1  No evidence of adnexal mass or ovarian torsion  2   Status post hysterectomy   Workstation performed: [id number]        POCT Labs

## 2020-07-06 ENCOUNTER — OFFICE VISIT (OUTPATIENT)
Dept: FAMILY MEDICINE CLINIC | Facility: CLINIC | Age: 30
End: 2020-07-06
Payer: COMMERCIAL

## 2020-07-06 VITALS
RESPIRATION RATE: 16 BRPM | OXYGEN SATURATION: 98 % | HEART RATE: 106 BPM | HEIGHT: 61 IN | DIASTOLIC BLOOD PRESSURE: 72 MMHG | TEMPERATURE: 99.7 F | WEIGHT: 207.8 LBS | BODY MASS INDEX: 39.23 KG/M2 | SYSTOLIC BLOOD PRESSURE: 116 MMHG

## 2020-07-06 DIAGNOSIS — E55.9 VITAMIN D DEFICIENCY: ICD-10-CM

## 2020-07-06 DIAGNOSIS — E66.9 CLASS 2 OBESITY WITHOUT SERIOUS COMORBIDITY WITH BODY MASS INDEX (BMI) OF 39.0 TO 39.9 IN ADULT, UNSPECIFIED OBESITY TYPE: ICD-10-CM

## 2020-07-06 DIAGNOSIS — G47.09 OTHER INSOMNIA: ICD-10-CM

## 2020-07-06 DIAGNOSIS — Z23 NEED FOR VACCINATION: ICD-10-CM

## 2020-07-06 DIAGNOSIS — F41.9 ANXIETY: ICD-10-CM

## 2020-07-06 DIAGNOSIS — Z13.6 SCREENING FOR CARDIOVASCULAR CONDITION: ICD-10-CM

## 2020-07-06 DIAGNOSIS — Z13.1 SCREENING FOR DIABETES MELLITUS: ICD-10-CM

## 2020-07-06 DIAGNOSIS — Z00.00 ANNUAL PHYSICAL EXAM: Primary | ICD-10-CM

## 2020-07-06 DIAGNOSIS — F33.9 EPISODE OF RECURRENT MAJOR DEPRESSIVE DISORDER, UNSPECIFIED DEPRESSION EPISODE SEVERITY (HCC): ICD-10-CM

## 2020-07-06 DIAGNOSIS — R10.2 PELVIC PAIN: ICD-10-CM

## 2020-07-06 PROBLEM — E66.812 CLASS 2 OBESITY WITHOUT SERIOUS COMORBIDITY WITH BODY MASS INDEX (BMI) OF 39.0 TO 39.9 IN ADULT: Status: ACTIVE | Noted: 2019-06-04

## 2020-07-06 PROCEDURE — 90471 IMMUNIZATION ADMIN: CPT

## 2020-07-06 PROCEDURE — 90732 PPSV23 VACC 2 YRS+ SUBQ/IM: CPT

## 2020-07-06 PROCEDURE — 99395 PREV VISIT EST AGE 18-39: CPT | Performed by: FAMILY MEDICINE

## 2020-07-06 RX ORDER — TRAZODONE HYDROCHLORIDE 100 MG/1
100 TABLET ORAL
Qty: 90 TABLET | Refills: 2 | Status: SHIPPED | OUTPATIENT
Start: 2020-07-06 | End: 2020-10-26 | Stop reason: SDUPTHER

## 2020-07-06 RX ORDER — BUSPIRONE HYDROCHLORIDE 7.5 MG/1
TABLET ORAL
Qty: 270 TABLET | Refills: 2 | Status: SHIPPED | OUTPATIENT
Start: 2020-07-06 | End: 2020-10-26 | Stop reason: SDUPTHER

## 2020-07-06 NOTE — PATIENT INSTRUCTIONS
Wellness Visit for Adults   AMBULATORY CARE:   A wellness visit  is when you see your healthcare provider to get screened for health problems  You can also get advice on how to stay healthy  Write down your questions so you remember to ask them  Ask your healthcare provider how often you should have a wellness visit  What happens at a wellness visit:  Your healthcare provider will ask about your health, and your family history of health problems  This includes high blood pressure, heart disease, and cancer  He or she will ask if you have symptoms that concern you, if you smoke, and about your mood  You may also be asked about your intake of medicines, supplements, food, and alcohol  Any of the following may be done:  · Your weight  will be checked  Your height may also be checked so your body mass index (BMI) can be calculated  Your BMI shows if you are at a healthy weight  · Your blood pressure  and heart rate will be checked  Your temperature may also be checked  · Blood and urine tests  may be done  Blood tests may be done to check your cholesterol levels  Abnormal cholesterol levels increase your risk for heart disease and stroke  You may also need a blood or urine test to check for diabetes if you are at increased risk  Urine tests may be done to look for signs of an infection or kidney disease  · A physical exam  includes checking your heartbeat and lungs with a stethoscope  Your healthcare provider may also check your skin to look for sun damage  · Screening tests  may be recommended  A screening test is done to check for diseases that may not cause symptoms  The screening tests you may need depend on your age, gender, family history, and lifestyle habits  For example, colorectal screening may be recommended if you are 48years old or older  Screening tests you need if you are a woman:   · A Pap smear  is used to screen for cervical cancer   Pap smears are usually done every 3 to 5 years depending on your age  You may need them more often if you have had abnormal Pap smear test results in the past  Ask your healthcare provider how often you should have a Pap smear  · A mammogram  is an x-ray of your breasts to screen for breast cancer  Experts recommend mammograms every 2 years starting at age 48 years  You may need a mammogram at age 52 years or younger if you have an increased risk for breast cancer  Talk to your healthcare provider about when you should start having mammograms and how often you need them  Vaccines you may need:   · Get an influenza vaccine  every year  The influenza vaccine protects you from the flu  Several types of viruses cause the flu  The viruses change over time, so new vaccines are made each year  · Get a tetanus-diphtheria (Td) booster vaccine  every 10 years  This vaccine protects you against tetanus and diphtheria  Tetanus is a severe infection that may cause painful muscle spasms and lockjaw  Diphtheria is a severe bacterial infection that causes a thick covering in the back of your mouth and throat  · Get a human papillomavirus (HPV) vaccine  if you are female and aged 23 to 32 or male 23 to 24 and never received it  This vaccine protects you from HPV infection  HPV is the most common infection spread by sexual contact  HPV may also cause vaginal, penile, and anal cancers  · Get a pneumococcal vaccine  if you are aged 72 years or older  The pneumococcal vaccine is an injection given to protect you from pneumococcal disease  Pneumococcal disease is an infection caused by pneumococcal bacteria  The infection may cause pneumonia, meningitis, or an ear infection  · Get a shingles vaccine  if you are aged 61 or older, even if you have had shingles before  The shingles vaccine is an injection to protect you from the varicella-zoster virus  This is the same virus that causes chickenpox   Shingles is a painful rash that develops in people who had chickenpox or have been exposed to the virus  How to eat healthy:  My Plate is a model for planning healthy meals  It shows the types and amounts of foods that should go on your plate  Fruits and vegetables make up about half of your plate, and grains and protein make up the other half  A serving of dairy is included on the side of your plate  The amount of calories and serving sizes you need depends on your age, gender, weight, and height  Examples of healthy foods are listed below:  · Eat a variety of vegetables  such as dark green, red, and orange vegetables  You can also include canned vegetables low in sodium (salt) and frozen vegetables without added butter or sauces  · Eat a variety of fresh fruits , canned fruit in 100% juice, frozen fruit, and dried fruit  · Include whole grains  At least half of the grains you eat should be whole grains  Examples include whole-wheat bread, wheat pasta, brown rice, and whole-grain cereals such as oatmeal     · Eat a variety of protein foods such as seafood (fish and shellfish), lean meat, and poultry without skin (turkey and chicken)  Examples of lean meats include pork leg, shoulder, or tenderloin, and beef round, sirloin, tenderloin, and extra lean ground beef  Other protein foods include eggs and egg substitutes, beans, peas, soy products, nuts, and seeds  · Choose low-fat dairy products such as skim or 1% milk or low-fat yogurt, cheese, and cottage cheese  · Limit unhealthy fats  such as butter, hard margarine, and shortening  Exercise:  Exercise at least 30 minutes per day on most days of the week  Some examples of exercise include walking, biking, dancing, and swimming  You can also fit in more physical activity by taking the stairs instead of the elevator or parking farther away from stores  Include muscle strengthening activities 2 days each week  Regular exercise provides many health benefits   It helps you manage your weight, and decreases your risk for type 2 diabetes, heart disease, stroke, and high blood pressure  Exercise can also help improve your mood  Ask your healthcare provider about the best exercise plan for you  General health and safety guidelines:   · Do not smoke  Nicotine and other chemicals in cigarettes and cigars can cause lung damage  Ask your healthcare provider for information if you currently smoke and need help to quit  E-cigarettes or smokeless tobacco still contain nicotine  Talk to your healthcare provider before you use these products  · Limit alcohol  A drink of alcohol is 12 ounces of beer, 5 ounces of wine, or 1½ ounces of liquor  · Lose weight, if needed  Being overweight increases your risk of certain health conditions  These include heart disease, high blood pressure, type 2 diabetes, and certain types of cancer  · Protect your skin  Do not sunbathe or use tanning beds  Use sunscreen with a SPF 15 or higher  Apply sunscreen at least 15 minutes before you go outside  Reapply sunscreen every 2 hours  Wear protective clothing, hats, and sunglasses when you are outside  · Drive safely  Always wear your seatbelt  Make sure everyone in your car wears a seatbelt  A seatbelt can save your life if you are in an accident  Do not use your cell phone when you are driving  This could distract you and cause an accident  Pull over if you need to make a call or send a text message  · Practice safe sex  Use latex condoms if are sexually active and have more than one partner  Your healthcare provider may recommend screening tests for sexually transmitted infections (STIs)  · Wear helmets, lifejackets, and protective gear  Always wear a helmet when you ride a bike or motorcycle, go skiing, or play sports that could cause a head injury  Wear protective equipment when you play sports  Wear a lifejacket when you are on a boat or doing water sports    © 2017 [facility] Information is for End User's use only and may not be sold, redistributed or otherwise used for commercial purposes  All illustrations and images included in CareNotes® are the copyrighted property of [employer]  or [employer]  The above information is an  only  It is not intended as medical advice for individual conditions or treatments  Talk to your doctor, nurse or pharmacist before following any medical regimen to see if it is safe and effective for you  Obesity   AMBULATORY CARE:   Obesity  is when your body mass index (BMI) is greater than 30  Your healthcare provider will use your height and weight to measure your BMI  The risks of obesity include  many health problems, such as injuries or physical disability  You may need tests to check for the following:  · Diabetes     · High blood pressure or high cholesterol     · Heart disease     · Gallbladder or liver disease     · Cancer of the colon, breast, prostate, liver, or kidney     · Sleep apnea     · Arthritis or gout  Seek care immediately if:   · You have a severe headache, confusion, or difficulty speaking  · You have weakness on one side of your body  · You have chest pain, sweating, or shortness of breath  Contact your healthcare provider if:   · You have symptoms of gallbladder or liver disease, such as pain in your upper abdomen  · You have knee or hip pain and discomfort while walking  · You have symptoms of diabetes, such as intense hunger and thirst, and frequent urination  · You have symptoms of sleep apnea, such as snoring or daytime sleepiness  · You have questions or concerns about your condition or care  Treatment for obesity  focuses on helping you lose weight to improve your health  Even a small decrease in BMI can reduce the risk for many health problems  Your healthcare provider will help you set a weight-loss goal   · Lifestyle changes  are the first step in treating obesity   These include making healthy food choices and getting regular physical activity  Your healthcare provider may suggest a weight-loss program that involves coaching, education, and therapy  · Medicine  may help you lose weight when it is used with a healthy diet and physical activity  · Surgery  can help you lose weight if you are very obese and have other health problems  There are several types of weight-loss surgery  Ask your healthcare provider for more information  Be successful losing weight:   · Set small, realistic goals  An example of a small goal is to walk for 20 minutes 5 days a week  Anther goal is to lose 5% of your body weight  · Tell friends, family members, and coworkers about your goals  and ask for their support  Ask a friend to lose weight with you, or join a weight-loss support group  · Identify foods or triggers that may cause you to overeat , and find ways to avoid them  Remove tempting high-calorie foods from your home and workplace  Place a bowl of fresh fruit on your kitchen counter  If stress causes you to eat, then find other ways to cope with stress  · Keep a diary to track what you eat and drink  Also write down how many minutes of physical activity you do each day  Weigh yourself once a week and record it in your diary  Eating changes: You will need to eat 500 to 1,000 fewer calories each day than you currently eat to lose 1 to 2 pounds a week  The following changes will help you cut calories:  · Eat smaller portions  Use small plates, no larger than 9 inches in diameter  Fill your plate half full of fruits and vegetables  Measure your food using measuring cups until you know what a serving size looks like  · Eat 3 meals and 1 or 2 snacks each day  Plan your meals in advance  [place] and eat at home most of the time  Eat slowly  · Eat fruits and vegetables at every meal   They are low in calories and high in fiber, which makes you feel full   Do not add butter, margarine, or cream sauce to vegetables  Use herbs to season steamed vegetables  · Eat less fat and fewer fried foods  Eat more baked or grilled chicken and fish  These protein sources are lower in calories and fat than red meat  Limit fast food  Dress your salads with olive oil and vinegar instead of bottled dressing  · Limit the amount of sugar you eat  Do not drink sugary beverages  Limit alcohol  Activity changes:  Physical activity is good for your body in many ways  It helps you burn calories and build strong muscles  It decreases stress and depression, and improves your mood  It can also help you sleep better  Talk to your healthcare provider before you begin an exercise program   · Exercise for at least 30 minutes 5 days a week  Start slowly  Set aside time each day for physical activity that you enjoy and that is convenient for you  It is best to do both weight training and an activity that increases your heart rate, such as walking, bicycling, or swimming  · Find ways to be more active  Do yard work and housecleaning  Walk up the stairs instead of using elevators  Spend your leisure time going to events that require walking, such as outdoor festivals or fairs  This extra physical activity can help you lose weight and keep it off  Follow up with your healthcare provider as directed: You may need to meet with a dietitian  Write down your questions so you remember to ask them during your visits  © 2017 [address] Information is for End User's use only and may not be sold, redistributed or otherwise used for commercial purposes  All illustrations and images included in CareNotes® are the copyrighted property of [employer]  or [employer]  The above information is an  only  It is not intended as medical advice for individual conditions or treatments   Talk to your doctor, nurse or pharmacist before following any medical regimen to see if it is safe and effective for you     Cigarette Smoking and Your Health   AMBULATORY CARE:   Risks to your health if you smoke:  Nicotine and other chemicals found in tobacco damage every cell in your body  Even if you are a light smoker, you have an increased risk for cancer, heart disease, and lung disease  If you are pregnant or have diabetes, smoking increases your risk for complications  Benefits to your health if you stop smoking:   · You decrease respiratory symptoms such as coughing, wheezing, and shortness of breath  · You reduce your risk for cancers of the lung, mouth, throat, kidney, bladder, pancreas, stomach, and cervix  If you already have cancer, you increase the benefits of chemotherapy  You also reduce your risk for cancer returning or a second cancer from developing  · You reduce your risk for heart disease, blood clots, heart attack, and stroke  · You reduce your risk for lung infections, and diseases such as pneumonia, asthma, chronic bronchitis, and emphysema  · Your circulation improves  More oxygen can be delivered to your body  If you have diabetes, you lower your risk for complications, such as kidney, artery, and eye diseases  You also lower your risk for nerve damage  Nerve damage can lead to amputations, poor vision, and blindness  · You improve your body's ability to heal and to fight infections  Benefits to the health of others if you stop smoking:  Tobacco is harmful to nonsmokers who breathe in your secondhand smoke  The following are ways the health of others around you may improve when you stop smoking:  · You lower the risks for lung cancer and heart disease in nonsmoking adults  · If you are pregnant, you lower the risk for miscarriage, early delivery, low birth weight, and stillbirth  You also lower your baby's risk for SIDS, obesity, developmental delay, and neurobehavioral problems, such as ADHD       · If you have children, you lower their risk for ear infections, colds, pneumonia, bronchitis, and asthma  For more information and support to stop smoking:   · Smokefree  gov  Phone: 2- 764 - 514-6399  Web Address: www [employer]  Follow up with your healthcare provider as directed:  Write down your questions so you remember to ask them during your visits  © 2017 [address] Information is for End User's use only and may not be sold, redistributed or otherwise used for commercial purposes  All illustrations and images included in CareNotes® are the copyrighted property of [employer]  or [employer]  The above information is an  only  It is not intended as medical advice for individual conditions or treatments  Talk to your doctor, nurse or pharmacist before following any medical regimen to see if it is safe and effective for you  Cholesterol and Your Health   AMBULATORY CARE:   Cholesterol  is a waxy, fat-like substance  Cholesterol is made by your body, but also comes from certain foods you eat  Your body uses cholesterol to make hormones and new cells  Your body also uses cholesterol to protect nerves  Cholesterol comes from foods such as meat and dairy products  Your total cholesterol level is made up by LDL cholesterol, HDL cholesterol, and triglycerides:  · LDL cholesterol  is called bad cholesterol  because it forms plaque in your arteries  As plaque builds up, your arteries become narrow, and less blood flows through  When plaque decreases blood flow to your heart, you may have chest pain  If plaque completely blocks an artery that bring blood to your heart, you may have a heart attack  Plaque can break off and form blood clots  Blood clots may block arteries in your brain and cause a stroke  · HDL cholesterol  is called good cholesterol  because it helps remove LDL cholesterol from your arteries  It does this by attaching to LDL cholesterol and carrying it to your liver   Your liver breaks down LDL cholesterol so your body can get rid of it  High levels of HDL cholesterol can help prevent a heart attack and stroke  Low levels of HDL cholesterol can increase your risk for heart disease, heart attack, and stroke  · Triglycerides  are a type of fat that store energy from foods you eat  High levels of triglycerides also cause plaque buildup  This can increase your risk for a heart attack or stroke  If your triglyceride level is high, your LDL cholesterol level may also be high  How food affects your cholesterol levels:   · Unhealthy fats  increase LDL cholesterol and triglyceride levels in your blood  They are found in foods high in cholesterol, saturated fat, and trans fat:     ¨ Cholesterol  is found in eggs, dairy, and meat  ¨ Saturated fat  is found in butter, cheese, ice cream, whole milk, and coconut oil  Saturated fat is also found in meat, such as sausage, hot dogs, and bologna  ¨ Trans fat  is found in liquid oils and is used in fried and baked foods  Foods that contain trans fats include chips, crackers, muffins, sweet rolls, microwave popcorn, and cookies  · Healthy fats,  also called unsaturated fats, help lower LDL cholesterol and triglyceride levels  Healthy fats include the following:     ¨ Monounsaturated fats  are found in foods such as olive oil, canola oil, avocado, nuts, and olives  ¨ Polyunsaturated fats,  such as omega 3 fats, are found in fish, such as salmon, trout, and tuna  They can also be found in plant foods such as flaxseed, walnuts, and soybeans  Other things that affect your cholesterol levels:   · Smoking cigarettes    · Being overweight or obese     · Drinking large amounts of alcohol    · Not enough exercise or no exercise    · Certain genes passed from your parents to you  What you need to know about having your cholesterol levels checked: Adults 21to 39years of age should have their cholesterol levels checked every 4 to 6 years   Adults 45 years and older should have their cholesterol checked every 1 to 2 years  You may need your cholesterol checked more often, or at a younger age, if you have risk factors for heart disease  You may also need to have your cholesterol checked more often if you have other health conditions, such as diabetes  Blood tests are used to check cholesterol levels  Blood tests measure your levels of triglycerides, LDL cholesterol, and HDL cholesterol  Cholesterol level goals: Your cholesterol level goal may depend on your risk for heart disease  It may also depend on your age and other health conditions  Ask your healthcare provider if the following goals are right for you:  · Your total cholesterol level  should be less than 200 mg/dL  This number may also depend on your HDL and LDL cholesterol goals  · Your LDL cholesterol level  should be less than 130 mg/dL  · Your HDL cholesterol level  should be 60 mg/dL or higher  · Your triglyceride level  should be less than 150 mg/dL  Treatment for high cholesterol:  Treatment for high cholesterol will also decrease your risk of heart disease, heart attack, and stroke  Treatment may include any of the following:  · Medicines  may be given to lower your LDL cholesterol, triglyceride levels, or total cholesterol level  You may need medicines to lower your cholesterol if any of the following is true:     ¨ You have a history of stroke, TIA, unstable angina, or a heart attack    ¨ Your LDL cholesterol level is 190 mg/dL or higher    ¨ You are age 36to 76years of age, have diabetes, and your LDL cholesterol is 70 mg/dL or higher    ¨ You are age 36to 76years of age, have risk factors for heart disease, and your LDL cholesterol is 70 mg/dL or higher    · Lifestyle changes  include changes to your diet, exercise, weight loss, and quitting smoking  It also includes decreasing the amount of alcohol you drink  · Supplements  include fish oil, red yeast rice, and garlic   Fish oil may help lower your triglyceride and LDL cholesterol levels  It may also increase your HDL cholesterol level  Red yeast rice may help decrease your total cholesterol level and LDL cholesterol level  Garlic may help lower your total cholesterol level  Do not take these supplements without talking to your healthcare provider  Nutrition to help lower your cholesterol levels:  A registered dietitian can help you create a healthy eating plan  Read food labels and choose foods low in saturated fat, trans fats, and cholesterol  · Decrease the total amount of fat you eat  Choose lean meats, fat-free or 1% fat milk, and low-fat dairy products, such as yogurt and cheese  Try to limit or avoid red meats  Limit or do not eat fried foods or baked goods such as cookies  · Replace unhealthy fats with healthy fats  Cook foods in olive oil or canola oil  Choose soft margarines that are low in saturated fat and trans fat  Seeds, nuts, and avocados are other examples of healthy fats  · Eat foods with omega-3 fats  Examples include salmon, tuna, mackerel, walnuts, and flaxseed  Eat fish 2 times per week  Children and pregnant women should not eat fish that have high levels of mercury, such as shark, swordfish, and cristhian mackerel  · Increase the amount of plant-based foods you eat  Plant-based foods are low in cholesterol and fat  Eating more of these foods may help lower your cholesterol and help you lose weight  Examples of plant-based foods includes fruits, vegetables, legumes, and whole grains  Replace milk that contains dairy with almond, soy, or coconut milk  Eat beans and foods with soy for protein instead of meat  Ask your healthcare provider or dietitian for more information on plant-based foods  · Increase the amount of fiber you eat  High-fiber foods can help lower your LDL cholesterol  You should eat between 20 and 30 grams of fiber each day  Eat at least 5 servings of fruits and vegetables each day   Other examples of high-fiber foods include whole-grain or whole-wheat breads, pastas, or cereals, and brown rice  Eat 3 ounces of whole-grain foods each day  Increase fiber slowly  You may have abdominal discomfort, bloating, and gas if you add fiber to your diet too quickly  Lifestyle changes you can make to help lower your cholesterol levels:   · Maintain a healthy weight  Ask your healthcare provider how much you should weigh  Ask him or her to help you create a weight loss plan if you are overweight  Weight loss can decrease your total cholesterol and triglyceride levels  · Exercise regularly  Exercise can help lower your total cholesterol level and maintain a healthy weight  Exercise can also help increase your HDL cholesterol level  Work with your healthcare provider to create an exercise program that is right for you  Get at least 30 minutes of moderate exercise most days of the week  Examples of exercise include brisk walking, swimming, or biking  · Do not smoke  Nicotine and other chemicals in cigarettes and cigars can damage your lungs, heart, and blood vessels  They can also raise your triglyceride levels  Ask your healthcare provider for information if you currently smoke and need help to quit  E-cigarettes or smokeless tobacco still contain nicotine  Talk to your healthcare provider before you use these products  · Limit or do not drink alcohol  Alcohol can increase your triglyceride levels  Ask your healthcare provider if it is safe for you to drink alcohol  Also ask how much is safe for you to drink each day  © 2017 [address] Information is for End User's use only and may not be sold, redistributed or otherwise used for commercial purposes  All illustrations and images included in CareNotes® are the copyrighted property of [employer] , [employer]  or [employer]  The above information is an  only   It is not intended as medical advice for individual conditions or treatments  Talk to your doctor, nurse or pharmacist before following any medical regimen to see if it is safe and effective for you

## 2020-07-07 ENCOUNTER — CONSULT (OUTPATIENT)
Dept: MULTI SPECIALTY CLINIC | Facility: CLINIC | Age: 30
End: 2020-07-07

## 2020-07-07 VITALS
TEMPERATURE: 98.4 F | BODY MASS INDEX: 39.38 KG/M2 | SYSTOLIC BLOOD PRESSURE: 128 MMHG | WEIGHT: 208.56 LBS | HEART RATE: 96 BPM | HEIGHT: 61 IN | DIASTOLIC BLOOD PRESSURE: 80 MMHG

## 2020-07-07 DIAGNOSIS — R39.89 BLADDER PAIN: ICD-10-CM

## 2020-07-07 DIAGNOSIS — N32.89 BLADDER SPASMS: Primary | ICD-10-CM

## 2020-07-07 PROCEDURE — 4004F PT TOBACCO SCREEN RCVD TLK: CPT | Performed by: UROLOGY

## 2020-07-07 PROCEDURE — 3008F BODY MASS INDEX DOCD: CPT | Performed by: UROLOGY

## 2020-07-07 PROCEDURE — 3008F BODY MASS INDEX DOCD: CPT | Performed by: FAMILY MEDICINE

## 2020-07-07 PROCEDURE — 99213 OFFICE O/P EST LOW 20 MIN: CPT | Performed by: UROLOGY

## 2020-07-07 RX ORDER — OXYBUTYNIN CHLORIDE 5 MG/1
5 TABLET ORAL 2 TIMES DAILY
Qty: 60 TABLET | Refills: 2 | Status: SHIPPED | OUTPATIENT
Start: 2020-07-07 | End: 2020-12-09 | Stop reason: SDUPTHER

## 2020-07-07 NOTE — PROGRESS NOTES
Assessment/Plan:    82-year-old female with bladder spasms,   -Ditropan 5 mg p o  B i d   -return to clinic p r n  or if symptoms do not improve  Subjective:      Patient ID: Jada Monteiro is a 34 y o  female  This is a 82-year-old female who presents complaining pelvic pain for the last 6 weeks  She states the pain is constant and dull in nature  It increases while urinating and occasionally stays more intense for a short while after urination  She states occasionally it is sharp in nature  She was seen in the emergency room at [facility] on 06/14/20 and was diagnosed with acute cystitis without hematuria and given Keflex 500 mg t i d  For 7 days and Pyridium at that time  Two days later she contacted her family physician who told her to stop the Keflex because the urine was normal and her symptoms were unchanged  At this time she denies frequency, urgency, nocturia, hematuria, any change in color or odor of her urine  Patient does have a history of endometriosis in 2017 underwent a hysterectomy  Patient still has her ovaries  She had a normal gynecologic examination on June 19, 2020  Patient had ultrasound of the pelvis on June 25th that revealed an ovarian follicle present but no obvious source of pain  The following portions of the patient's history were reviewed and updated as appropriate: allergies, current medications, past family history, past medical history, past social history, past surgical history and problem list     Review of Systems   Constitutional: Negative for activity change, appetite change, diaphoresis, fatigue, fever and unexpected weight change  HENT: Negative for congestion and sore throat  Eyes: Negative for pain and redness  Respiratory: Negative for cough, shortness of breath and wheezing  Cardiovascular: Negative for chest pain and palpitations     Gastrointestinal: Negative for abdominal distention, abdominal pain, constipation, diarrhea, nausea and vomiting  Endocrine: Negative for cold intolerance and heat intolerance  Genitourinary: Positive for difficulty urinating, dysuria and pelvic pain  Negative for decreased urine volume, enuresis, flank pain, frequency, hematuria, menstrual problem, urgency, vaginal bleeding and vaginal pain  Skin: Negative for color change, pallor and rash  Neurological: Negative for dizziness, speech difficulty and weakness  Psychiatric/Behavioral: Negative for confusion and sleep disturbance  All other systems reviewed and are negative  Objective:      /80   Pulse 96   Temp 98 4 °F (36 9 °C)   Ht 5' 1" (1 549 m)   Wt 94 6 kg (208 lb 8 9 oz)   LMP 01/01/2016 (Within Years)   BMI 39 41 kg/m²          Physical Exam   Constitutional: She is oriented to person, place, and time  She appears well-developed and well-nourished  HENT:   Head: Normocephalic and atraumatic  Eyes: Pupils are equal, round, and reactive to light  EOM are normal    Neck: Neck supple  Cardiovascular: Normal rate, regular rhythm and normal heart sounds  Pulmonary/Chest: Effort normal and breath sounds normal  She has no wheezes  She has no rales  Abdominal: Soft  Bowel sounds are normal  She exhibits no distension and no mass  There is no tenderness  There is no rebound and no guarding  Genitourinary:   Genitourinary Comments: per examination on June 19th 2020 normal GYN exam   Musculoskeletal: Normal range of motion  Neurological: She is alert and oriented to person, place, and time  This text is generated with voice recognition software  There may be translation, syntax,  or grammatical errors  If you have any questions, please contact the dictating provider        Rodrigo Bucio PA-C

## 2020-07-11 DIAGNOSIS — R39.89 BLADDER PAIN: ICD-10-CM

## 2020-07-11 RX ORDER — HYDROXYZINE PAMOATE 25 MG/1
25 CAPSULE ORAL
Qty: 30 CAPSULE | Refills: 2 | Status: SHIPPED | OUTPATIENT
Start: 2020-07-11 | End: 2021-10-29

## 2020-10-14 DIAGNOSIS — N32.89 BLADDER SPASMS: ICD-10-CM

## 2020-10-21 ENCOUNTER — PATIENT MESSAGE (OUTPATIENT)
Dept: FAMILY MEDICINE CLINIC | Facility: CLINIC | Age: 30
End: 2020-10-21

## 2020-10-21 DIAGNOSIS — N32.89 BLADDER SPASMS: ICD-10-CM

## 2020-10-26 ENCOUNTER — PATIENT MESSAGE (OUTPATIENT)
Dept: FAMILY MEDICINE CLINIC | Facility: CLINIC | Age: 30
End: 2020-10-26

## 2020-10-26 DIAGNOSIS — F41.9 ANXIETY: ICD-10-CM

## 2020-10-26 DIAGNOSIS — F33.9 EPISODE OF RECURRENT MAJOR DEPRESSIVE DISORDER, UNSPECIFIED DEPRESSION EPISODE SEVERITY (HCC): ICD-10-CM

## 2020-10-26 DIAGNOSIS — G47.09 OTHER INSOMNIA: ICD-10-CM

## 2020-10-26 RX ORDER — BUSPIRONE HYDROCHLORIDE 7.5 MG/1
TABLET ORAL
Qty: 270 TABLET | Refills: 2 | Status: SHIPPED | OUTPATIENT
Start: 2020-10-26 | End: 2021-05-26

## 2020-10-26 RX ORDER — VENLAFAXINE HYDROCHLORIDE 75 MG/1
75 CAPSULE, EXTENDED RELEASE ORAL DAILY
Qty: 90 CAPSULE | Refills: 2 | Status: SHIPPED | OUTPATIENT
Start: 2020-10-26 | End: 2021-10-29

## 2020-10-26 RX ORDER — TRAZODONE HYDROCHLORIDE 100 MG/1
100 TABLET ORAL
Qty: 90 TABLET | Refills: 2 | Status: SHIPPED | OUTPATIENT
Start: 2020-10-26 | End: 2021-10-29 | Stop reason: SDUPTHER

## 2020-10-26 RX ORDER — VENLAFAXINE HYDROCHLORIDE 150 MG/1
150 CAPSULE, EXTENDED RELEASE ORAL DAILY
Qty: 90 CAPSULE | Refills: 2 | Status: SHIPPED | OUTPATIENT
Start: 2020-10-26 | End: 2021-10-29

## 2020-12-02 DIAGNOSIS — N32.89 BLADDER SPASMS: ICD-10-CM

## 2020-12-09 RX ORDER — OXYBUTYNIN CHLORIDE 5 MG/1
5 TABLET ORAL 2 TIMES DAILY
Qty: 180 TABLET | Refills: 1 | Status: SHIPPED | OUTPATIENT
Start: 2020-12-09 | End: 2021-01-05 | Stop reason: SDUPTHER

## 2020-12-18 ENCOUNTER — TELEMEDICINE (OUTPATIENT)
Dept: FAMILY MEDICINE CLINIC | Facility: CLINIC | Age: 30
End: 2020-12-18
Payer: COMMERCIAL

## 2020-12-18 VITALS — TEMPERATURE: 97.8 F | WEIGHT: 208 LBS | BODY MASS INDEX: 39.27 KG/M2 | HEIGHT: 61 IN | HEART RATE: 97 BPM

## 2020-12-18 DIAGNOSIS — B34.9 VIRAL INFECTION, UNSPECIFIED: Primary | ICD-10-CM

## 2020-12-18 DIAGNOSIS — B34.9 VIRAL INFECTION, UNSPECIFIED: ICD-10-CM

## 2020-12-18 PROCEDURE — U0003 INFECTIOUS AGENT DETECTION BY NUCLEIC ACID (DNA OR RNA); SEVERE ACUTE RESPIRATORY SYNDROME CORONAVIRUS 2 (SARS-COV-2) (CORONAVIRUS DISEASE [COVID-19]), AMPLIFIED PROBE TECHNIQUE, MAKING USE OF HIGH THROUGHPUT TECHNOLOGIES AS DESCRIBED BY CMS-2020-01-R: HCPCS | Performed by: FAMILY MEDICINE

## 2020-12-18 PROCEDURE — 99214 OFFICE O/P EST MOD 30 MIN: CPT | Performed by: FAMILY MEDICINE

## 2020-12-18 RX ORDER — ALBUTEROL SULFATE 90 UG/1
2 AEROSOL, METERED RESPIRATORY (INHALATION) EVERY 4 HOURS PRN
Qty: 1 INHALER | Refills: 0 | Status: SHIPPED | OUTPATIENT
Start: 2020-12-18 | End: 2020-12-28

## 2020-12-19 LAB — SARS-COV-2 RNA SPEC QL NAA+PROBE: DETECTED

## 2020-12-19 NOTE — RESULT ENCOUNTER NOTE
Positive COVID-19 test   Patient should remain in isolation until further notice  Unfortunately, we do not accept her current insurance  Please give contact information for [facility] as her care may be more affordable there  I am happy to continue seeing her during her illness, but she will need to be self-pay  She needs a virtual appointment to discuss COVID further  Patient is also a candidate for monoclonal antibody infusion  Message sent to patient via [employer] patient portal   Nurse to also call patient

## 2020-12-31 ENCOUNTER — TELEPHONE (OUTPATIENT)
Dept: FAMILY MEDICINE CLINIC | Facility: CLINIC | Age: 30
End: 2020-12-31

## 2021-01-05 DIAGNOSIS — N32.89 BLADDER SPASMS: ICD-10-CM

## 2021-01-05 RX ORDER — OXYBUTYNIN CHLORIDE 5 MG/1
5 TABLET ORAL 2 TIMES DAILY
Qty: 60 TABLET | Refills: 0 | OUTPATIENT
Start: 2021-01-05

## 2021-01-05 RX ORDER — OXYBUTYNIN CHLORIDE 5 MG/1
TABLET ORAL
Qty: 180 TABLET | Refills: 0 | OUTPATIENT
Start: 2021-01-05

## 2021-01-05 RX ORDER — OXYBUTYNIN CHLORIDE 5 MG/1
5 TABLET ORAL 2 TIMES DAILY
Qty: 180 TABLET | Refills: 3 | Status: SHIPPED | OUTPATIENT
Start: 2021-01-05 | End: 2021-05-26

## 2021-04-13 DIAGNOSIS — Z23 ENCOUNTER FOR IMMUNIZATION: ICD-10-CM

## 2021-04-25 ENCOUNTER — IMMUNIZATIONS (OUTPATIENT)
Dept: FAMILY MEDICINE CLINIC | Facility: HOSPITAL | Age: 31
End: 2021-04-25

## 2021-04-25 DIAGNOSIS — Z23 ENCOUNTER FOR IMMUNIZATION: Primary | ICD-10-CM

## 2021-04-25 PROCEDURE — 91300 SARS-COV-2 / COVID-19 MRNA VACCINE (PFIZER-BIONTECH) 30 MCG: CPT

## 2021-04-25 PROCEDURE — 0001A SARS-COV-2 / COVID-19 MRNA VACCINE (PFIZER-BIONTECH) 30 MCG: CPT

## 2021-05-16 ENCOUNTER — IMMUNIZATIONS (OUTPATIENT)
Dept: FAMILY MEDICINE CLINIC | Facility: HOSPITAL | Age: 31
End: 2021-05-16

## 2021-05-16 DIAGNOSIS — Z23 ENCOUNTER FOR IMMUNIZATION: Primary | ICD-10-CM

## 2021-05-16 PROCEDURE — 0002A SARS-COV-2 / COVID-19 MRNA VACCINE (PFIZER-BIONTECH) 30 MCG: CPT | Performed by: PHYSICIAN ASSISTANT

## 2021-05-16 PROCEDURE — 91300 SARS-COV-2 / COVID-19 MRNA VACCINE (PFIZER-BIONTECH) 30 MCG: CPT | Performed by: PHYSICIAN ASSISTANT

## 2021-05-20 ENCOUNTER — RA CDI HCC (OUTPATIENT)
Dept: OTHER | Facility: HOSPITAL | Age: 31
End: 2021-05-20

## 2021-05-20 NOTE — PROGRESS NOTES
Kaylie [facility]  coding opportunities          Chart reviewed, no opportunity found: CHART REVIEWED, NO OPPORTUNITY FOUND              Patients insurance company: [employer] (Medicare Advantage and Commercial)

## 2021-05-26 ENCOUNTER — OFFICE VISIT (OUTPATIENT)
Dept: FAMILY MEDICINE CLINIC | Facility: CLINIC | Age: 31
End: 2021-05-26
Payer: COMMERCIAL

## 2021-05-26 VITALS
OXYGEN SATURATION: 98 % | DIASTOLIC BLOOD PRESSURE: 70 MMHG | HEART RATE: 110 BPM | SYSTOLIC BLOOD PRESSURE: 112 MMHG | BODY MASS INDEX: 36.91 KG/M2 | RESPIRATION RATE: 16 BRPM | TEMPERATURE: 98.2 F | WEIGHT: 195.5 LBS | HEIGHT: 61 IN

## 2021-05-26 DIAGNOSIS — Z00.00 ANNUAL PHYSICAL EXAM: Primary | ICD-10-CM

## 2021-05-26 DIAGNOSIS — E66.01 SEVERE OBESITY (BMI 35.0-39.9) WITH COMORBIDITY (HCC): ICD-10-CM

## 2021-05-26 DIAGNOSIS — E55.9 VITAMIN D DEFICIENCY: ICD-10-CM

## 2021-05-26 DIAGNOSIS — Z13.1 SCREENING FOR DIABETES MELLITUS: ICD-10-CM

## 2021-05-26 DIAGNOSIS — F41.9 ANXIETY: ICD-10-CM

## 2021-05-26 DIAGNOSIS — F33.9 EPISODE OF RECURRENT MAJOR DEPRESSIVE DISORDER, UNSPECIFIED DEPRESSION EPISODE SEVERITY (HCC): ICD-10-CM

## 2021-05-26 DIAGNOSIS — G47.09 OTHER INSOMNIA: ICD-10-CM

## 2021-05-26 DIAGNOSIS — Z13.6 SCREENING FOR CARDIOVASCULAR CONDITION: ICD-10-CM

## 2021-05-26 DIAGNOSIS — F17.200 SMOKER: ICD-10-CM

## 2021-05-26 DIAGNOSIS — E66.9 OBESITY (BMI 35.0-39.9 WITHOUT COMORBIDITY): ICD-10-CM

## 2021-05-26 DIAGNOSIS — G43.909 MIGRAINE WITHOUT STATUS MIGRAINOSUS, NOT INTRACTABLE, UNSPECIFIED MIGRAINE TYPE: ICD-10-CM

## 2021-05-26 DIAGNOSIS — N32.89 BLADDER SPASM: ICD-10-CM

## 2021-05-26 DIAGNOSIS — E66.9 CLASS 2 OBESITY WITHOUT SERIOUS COMORBIDITY WITH BODY MASS INDEX (BMI) OF 36.0 TO 36.9 IN ADULT, UNSPECIFIED OBESITY TYPE: ICD-10-CM

## 2021-05-26 PROBLEM — E66.812 CLASS 2 OBESITY WITHOUT SERIOUS COMORBIDITY WITH BODY MASS INDEX (BMI) OF 39.0 TO 39.9 IN ADULT: Status: RESOLVED | Noted: 2019-06-04 | Resolved: 2021-05-26

## 2021-05-26 PROCEDURE — 3725F SCREEN DEPRESSION PERFORMED: CPT | Performed by: FAMILY MEDICINE

## 2021-05-26 PROCEDURE — 4004F PT TOBACCO SCREEN RCVD TLK: CPT | Performed by: FAMILY MEDICINE

## 2021-05-26 PROCEDURE — 99395 PREV VISIT EST AGE 18-39: CPT | Performed by: FAMILY MEDICINE

## 2021-05-26 PROCEDURE — 99213 OFFICE O/P EST LOW 20 MIN: CPT | Performed by: FAMILY MEDICINE

## 2021-05-26 PROCEDURE — 3008F BODY MASS INDEX DOCD: CPT | Performed by: FAMILY MEDICINE

## 2021-05-26 RX ORDER — BUSPIRONE HYDROCHLORIDE 15 MG/1
15 TABLET ORAL 2 TIMES DAILY
Qty: 60 TABLET | Refills: 1 | Status: SHIPPED | OUTPATIENT
Start: 2021-05-26 | End: 2021-10-15 | Stop reason: SDUPTHER

## 2021-05-26 NOTE — PROGRESS NOTES
Assessment/Plan:  Problem List Items Addressed This Visit        Cardiovascular and Mediastinum    Migraine without status migrainosus, not intractable     Stable s Rx  For headache and migraine prevention I would suggest a combination vitamin supplement which may include 1 or more of the following ingredients:  Magnesium 400 mg , Riboflavin (vitamin B2) 400 - 600 mg,  Butterbur 150 mg (PA free)  Other ingredients that may be helpful are feverfew, coenzyme Q10 100 mg three times a day,  melatonin and pamela  Some example brands that combine some of these ingredients are Migravent or Dolovent  Relevant Orders    Magnesium    Magnesium       Other    Class 2 obesity without serious comorbidity with body mass index (BMI) of 36 0 to 36 9 in adult     Improved  Recommend lifestyle modifications  Relevant Orders    TSH, 3rd generation with Free T4 reflex    Comprehensive metabolic panel    TSH, 3rd generation with Free T4 reflex    Anxiety     Worsening  Continue Effexor  mg daily  Increase BuSpar 15mg t i d  Pending counseling intake  Relevant Medications    busPIRone (BUSPAR) 15 mg tablet    Other Relevant Orders    TSH, 3rd generation with Free T4 reflex    Comprehensive metabolic panel    TSH, 3rd generation with Free T4 reflex    Episode of recurrent major depressive disorder (HCC)     Worsening  Continue Effexor  mg daily  Pending counseling intake  Relevant Medications    busPIRone (BUSPAR) 15 mg tablet    Other Relevant Orders    TSH, 3rd generation with Free T4 reflex    Comprehensive metabolic panel    TSH, 3rd generation with Free T4 reflex    Vitamin D deficiency     Pending labs  Continue multivitamin over-the-counter vitamin-D 3000 units daily           Relevant Orders    Comprehensive metabolic panel    Vitamin D 25 hydroxy    Comprehensive metabolic panel    Vitamin D 25 hydroxy    Other insomnia     Stable on Trazodone 100mg 1/2 pill QHS PRN   Continue Effexor  mg daily  Increase BuSpar 15mg t i d  Pending counseling intake  Smoker       Contemplative  Smoking cessation advised  Bladder spasm     Management per [facility]  Patient ran out of Ditropan and is awaiting refill per [facility]  Other Visit Diagnoses     Annual physical exam    -  Primary    Screening for cardiovascular condition        Relevant Orders    CBC and differential    Comprehensive metabolic panel    Lipid panel    LDL cholesterol, direct    Obesity (BMI 35 0-39 9 without comorbidity)        Relevant Orders    TSH, 3rd generation with Free T4 reflex    Severe obesity (BMI 35 0-39  9) with comorbidity ([address] )        Relevant Orders    TSH, 3rd generation with Free T4 reflex    Vitamin D 25 hydroxy    TSH, 3rd generation with Free T4 reflex    BMI 36 0-36 9,adult        Relevant Orders    TSH, 3rd generation with Free T4 reflex    Vitamin D 25 hydroxy    TSH, 3rd generation with Free T4 reflex    Screening for diabetes mellitus        Relevant Orders    Hemoglobin A1C           Return in about 6 weeks (around 7/7/2021) for F/U Anx/Dep, Insomnia, Labs; 4mo- Anx/Dep, Insomnia, Migraine, Vit D Def, Obesity, Labs  Future Appointments   Date Time Provider Samuel [place]amo   7/7/2021  3:20 PM Ralph Parikh DO [facility]        Subjective:     Reji Shepherd is a 31 y o  female who presents today for a follow-up on her chronic medical conditions  HPI:  Chief Complaint   Patient presents with    Physical Exam    Anxiety     would like to talk about changing medication      -- Above per clinical staff and reviewed  --      HPI      Today:      Physical    Watching diet  +Exercise        [facility]  [address] of Tdap status  Connie Winston delivered her children in 2013        Overdue for Dentist   Sees Optho q2 years           Health Maintenance   Topic Date Due    BMI: Followup Plan  02/17/2021    Annual Physical  07/06/2021    Cervical Cancer Screening  08/18/2021    Influenza Vaccine (Season Ended) 09/01/2021    BMI: Adult  05/26/2022    Depression Remission PHQ  05/26/2022    DTaP,Tdap,and Td Vaccines (2 - Td) 12/06/2023    HIV Screening  Completed    Pneumococcal Vaccine: Pediatrics (0 to 5 Years) and At-Risk Patients (6 to 59 Years)  Completed    COVID-19 Vaccine  Completed    HIB Vaccine  Aged Out    Hepatitis B Vaccine  Aged Out    IPV Vaccine  Aged Out    Hepatitis A Vaccine  Aged Out    Meningococcal ACWY Vaccine  Aged Out    HPV Vaccine  Aged Out         The following portions of the patient's history were reviewed and updated as appropriate: allergies, current medications, past family history, past medical history, past social history, past surgical history and problem list       Review of Systems     See other note  Current Outpatient Medications   Medication Sig Dispense Refill    Cholecalciferol (VITAMIN D3) 3000 units TABS Take 1 tablet by mouth daily      hydrOXYzine pamoate (VISTARIL) 25 mg capsule TAKE 1 CAPSULE (25 MG TOTAL) BY MOUTH DAILY AT BEDTIME 30 capsule 2    Multiple Vitamin (MULTIVITAMIN) tablet Take 1 tablet by mouth daily      traZODone (DESYREL) 100 mg tablet Take 1 tablet (100 mg total) by mouth daily at bedtime 90 tablet 2    venlafaxine (EFFEXOR-XR) 150 mg 24 hr capsule Take 1 capsule (150 mg total) by mouth daily Take with 75mg cap for a total of 225mg daily  90 capsule 2    venlafaxine (EFFEXOR-XR) 75 mg 24 hr capsule Take 1 capsule (75 mg total) by mouth daily Take with 150mg cap for a total of 225mg daily  90 capsule 2    busPIRone (BUSPAR) 15 mg tablet Take 1 tablet (15 mg total) by mouth 2 (two) times a day 60 tablet 1     No current facility-administered medications for this visit          Objective:  /70   Pulse (!) 110   Temp 98 2 °F (36 8 °C)   Resp 16   Ht 5' 1" (1 549 m)   Wt 88 7 kg (195 lb 8 oz)   LMP 01/01/2016 (Within Years)   SpO2 98%   BMI 36 94 kg/m²    Wt Readings from Last 3 Encounters:   05/26/21 88 7 kg (195 lb 8 oz)   12/18/20 94 3 kg (208 lb)   07/07/20 94 6 kg (208 lb 8 9 oz)      BP Readings from Last 3 Encounters:   05/26/21 112/70   07/07/20 128/80   07/06/20 116/72          Physical Exam     Vitals signs and nursing note reviewed  Constitutional:       Appearance: Normal appearance  She is well-developed  She is obese  HENT:      Head: Normocephalic and atraumatic  Right Ear: Tympanic membrane, ear canal and external ear normal       Left Ear: Tympanic membrane, ear canal and external ear normal       Nose: Nose normal       Right Sinus: No maxillary sinus tenderness or frontal sinus tenderness  Left Sinus: No maxillary sinus tenderness or frontal sinus tenderness  Mouth/Throat:      Mouth: Mucous membranes are moist       Pharynx: Oropharynx is clear  Uvula midline  Tonsils: No tonsillar exudate  Eyes:      Extraocular Movements: Extraocular movements intact  Conjunctiva/sclera: Conjunctivae normal       Pupils: Pupils are equal, round, and reactive to light  Neck:      Musculoskeletal: Neck supple  Thyroid: No thyromegaly  Cardiovascular:      Rate and Rhythm: Normal rate and regular rhythm  Pulses: Normal pulses  Heart sounds: Normal heart sounds  Pulmonary:      Effort: Pulmonary effort is normal       Breath sounds: Normal breath sounds  Abdominal:      General: Bowel sounds are normal  There is no distension  Palpations: Abdomen is soft  There is no mass  Tenderness: There is no abdominal tenderness  There is no guarding or rebound  Musculoskeletal:         General: No swelling or tenderness  Right lower leg: No edema  Left lower leg: No edema  Lymphadenopathy:      Cervical: No cervical adenopathy  Skin:     General: Skin is warm and dry  Findings: No rash     Neurological:      General: No focal deficit present  Mental Status: She is alert and oriented to person, place, and time  Cranial Nerves: No cranial nerve deficit  Sensory: No sensory deficit  Motor: No weakness  Coordination: Coordination normal       Deep Tendon Reflexes: Reflexes normal    Psychiatric:         Mood and Affect: Mood normal          Behavior: Behavior normal          Thought Content: Thought content normal          Judgment: Judgment normal      Lab Results:      Lab Results   Component Value Date    WBC 9 01 06/04/2019    HGB 12 2 06/04/2019    HCT 38 5 06/04/2019     06/04/2019    TRIG 77 06/04/2019    HDL 48 06/04/2019    ALT 36 06/30/2020    AST 18 06/30/2020    K 4 7 06/30/2020     06/30/2020    CREATININE 0 95 06/30/2020    BUN 13 06/30/2020    CO2 28 06/30/2020    GLUF 97 05/14/2020    HGBA1C 5 3 06/04/2019     No results found for: URICACID  Invalid input(s): BASENAME Vitamin D    No results found       POCT Labs

## 2021-05-26 NOTE — PROGRESS NOTES
Assessment/Plan:  Problem List Items Addressed This Visit        Cardiovascular and Mediastinum    Migraine without status migrainosus, not intractable     Stable s Rx  For headache and migraine prevention I would suggest a combination vitamin supplement which may include 1 or more of the following ingredients:  Magnesium 400 mg , Riboflavin (vitamin B2) 400 - 600 mg,  Butterbur 150 mg (PA free)  Other ingredients that may be helpful are feverfew, coenzyme Q10 100 mg three times a day,  melatonin and pamela  Some example brands that combine some of these ingredients are Migravent or Dolovent  Relevant Orders    Magnesium    Magnesium       Other    Class 2 obesity without serious comorbidity with body mass index (BMI) of 36 0 to 36 9 in adult     Improved  Recommend lifestyle modifications  Relevant Orders    TSH, 3rd generation with Free T4 reflex    Comprehensive metabolic panel    TSH, 3rd generation with Free T4 reflex    Anxiety     Worsening  Continue Effexor  mg daily  Increase BuSpar 15mg t i d  Pending counseling intake  Relevant Medications    busPIRone (BUSPAR) 15 mg tablet    Other Relevant Orders    TSH, 3rd generation with Free T4 reflex    Comprehensive metabolic panel    TSH, 3rd generation with Free T4 reflex    Episode of recurrent major depressive disorder (HCC)     Worsening  Continue Effexor  mg daily  Pending counseling intake  Relevant Medications    busPIRone (BUSPAR) 15 mg tablet    Other Relevant Orders    TSH, 3rd generation with Free T4 reflex    Comprehensive metabolic panel    TSH, 3rd generation with Free T4 reflex    Vitamin D deficiency     Pending labs  Continue multivitamin over-the-counter vitamin-D 3000 units daily           Relevant Orders    Comprehensive metabolic panel    Vitamin D 25 hydroxy    Comprehensive metabolic panel    Vitamin D 25 hydroxy    Other insomnia     Stable on Trazodone 100mg 1/2 pill QHS PRN   Continue Effexor  mg daily  Increase BuSpar 15mg t i d  Pending counseling intake  Smoker       Contemplative  Smoking cessation advised  Bladder spasm     Management per [facility]  Patient ran out of Ditropan and is awaiting refill per [facility]  Other Visit Diagnoses     Annual physical exam    -  Primary    Screening for cardiovascular condition        Relevant Orders    CBC and differential    Comprehensive metabolic panel    Lipid panel    LDL cholesterol, direct    Obesity (BMI 35 0-39 9 without comorbidity)        Relevant Orders    TSH, 3rd generation with Free T4 reflex    Severe obesity (BMI 35 0-39  9) with comorbidity ([address] )        Relevant Orders    TSH, 3rd generation with Free T4 reflex    Vitamin D 25 hydroxy    TSH, 3rd generation with Free T4 reflex    BMI 36 0-36 9,adult        Relevant Orders    TSH, 3rd generation with Free T4 reflex    Vitamin D 25 hydroxy    TSH, 3rd generation with Free T4 reflex    Screening for diabetes mellitus        Relevant Orders    Hemoglobin A1C           Return in about 6 weeks (around 7/7/2021) for F/U Anx/Dep, Insomnia, Labs; 4mo- Anx/Dep, Insomnia, Migraine, Vit D Def, Obesity, Labs  Future Appointments   Date Time Provider Samuel Alamo   7/7/2021  3:20 PM To [facility]        Subjective:     Willie Momin is a 31 y o  female who presents today for a follow-up on her chronic medical conditions  HPI:  Chief Complaint   Patient presents with    Physical Exam    Anxiety     would like to talk about changing medication      -- Above per clinical staff and reviewed  --      HPI      Today:      Return in about 4 months (around 11/6/2020) for 4mo- Anx/Dep, Adjust D/O, Insomnia, Migraine, Vit D Def, Obesity, Labs  4mo OV - Overdue due to lack on insurance    Patient did not complete labs 07/06/2020        Pelvic Pain / Bladder Spasms - Improved  Management per [facility] Dr Ochoa Laura  Next appt? Gerhardt Sep On Ditropan 5mg BID, but patient ran out of Rx  S/p stable transvaginal U/S 6/25/20 and pelvic exam per Gyn Dr Titi Sanders 0/16/46  Taking Vistaril 25mg QHS PRN for pelvic pain per Gyn            Obesity - Watching diet  - eating smaller portions, less junk food, no longer drinking soda since late 2/21, drinking more water, less salt   +Exercise - Walking 30 minutes, 7 days per week             Adjustment Disorder - Worsening anxiety since late 2/21  On Buspar 7 5mg TID PRN - taking 2 in AM and 1 in PM x 4 months  Denies trigger  Has one anxiety attacks daily at home, while driving, at work  Anxiety attacks last 30 seconds - 5 minutes  On increased Effexor ZV 760LQ QD    Last episode of unprovoked crying early 5/21  D/C Cymbalta 60mg QD as anxiety improved, but depression is worsened   D/C Lexapro 20mg QD due to worsening anxiety   D/C Zoloft 50mg QD x 3 weeks due to worsening anxiety   Was seeing counselor monthly prior COVID-19 - last appt 2/20, which was helpful, but counselor relocated  She is currently on a waiting list at 2 counseling offices as of 5/26/21  She is less quick to anger, less easily annoyed, has less unprovoked crying   Improved motivation  Parish Guzman has a good relationship c her ex-  Breathing exercises have been helpful   Symptoms since 2013 since birth of twin sons  No other mood meds or counseling previously   Good social supports   No SI/HI/AH/VH       Insomnia - On Trazodone 100mg QHS PRN - taking 1/2 pill 4 days per week as 50mg causes her too sleep too deeply through her alarm and makes her feel groggy for 30-45 minutes upon awakening  She can fall sleep after 10-15 minutes, and can sleep without interruption for 8 hours when she takes Rx  When she doesn't take Rx, it takes 45-60 minutes to fall asleep, awakens q2-3 hours, awake for 1-15 minutes, then can fall asleep again  Unsure of trigger    Previously, took 2-3 hours to fall asleep, sleeps for 2-3 hours, then awakens   No longer drinks 8 oz caffeine 4 times weekly   Denies snoring or witnessed apnea   Melantonin 10mg QHS x 3 weeks was unhelpful for falling asleep and staying asleep              PHQ-9 Depression Screening    PHQ-9:   Frequency of the following problems over the past two weeks:      Little interest or pleasure in doing things: 1 - several days  Feeling down, depressed, or hopeless: 1 - several days  Trouble falling or staying asleep, or sleeping too much: 2 - more than half the days  Feeling tired or having little energy: 2 - more than half the days  Poor appetite or overeatin - several days  Feeling bad about yourself - or that you are a failure or have let yourself or your family down: 1 - several days  Trouble concentrating on things, such as reading the newspaper or watching television: 0 - not at all  Moving or speaking so slowly that other people could have noticed  Or the opposite - being so fidgety or restless that you have been moving around a lot more than usual: 0 - not at all  Thoughts that you would be better off dead, or of hurting yourself in some way: 0 - not at all  PHQ-2 Score: 2  PHQ-9 Score: 8         BLADIMIR-7 Flowsheet Screening      Most Recent Value   Over the last two weeks, how often have you been bothered by the following problems? Feeling nervous, anxious, or on edge  2   Not being able to stop or control worrying  1   Worrying too much about different things  1   Trouble relaxing   2   Being so restless that it's hard to sit still  2   Becoming easily annoyed or irritable   1   Feeling afraid as if something awful might happen  0   How difficult have these problems made it for you to do your work, take care of things at home, or get along with other people? Not difficult at all   BLADIMIR Score   9           Vitamin D Deficiency - s/p Drisdol   Taking MVI and OTC Vitamin D 3,000IU daily        Migraines - Last migraine     Occurs less often - 1-2 times per month, once weekly when younger  Milan Avery had migraines since 14yo   Using Aleve / Ibuprofen / Tylenol PRN, rest, helpful   B/L Ethmoid pain - feels like eye strain, can progress to achy, throbbing sensation   Currently 0/10, Max 5-6/10   Lasts 30-45 minutes to 2-3 hours   Denies aura   Sometimes has associated nausea   Denies photophobia and phonophobia   No migraine Rx previously         Smoker - Smoking 4 cigarettes daily  Wants to weaning / quit cold turkey as she has done so previously  Quit date is 11/14/21, which is her 31th birthday            Reviewed:  Labs 6/30/20, Carol Collet 6/19/20     Sees [address]   Next appt 6/20 - Overdue        Unsure of Tdap status   [facility]'s OB[facility]Gyn Dr Abel Jean delivered her children in 2013        Overdue for Dentist   [facility] q2 years  The following portions of the patient's history were reviewed and updated as appropriate: allergies, current medications, past family history, past medical history, past social history, past surgical history and problem list       Review of Systems   Constitutional: Negative for appetite change, chills, diaphoresis, fatigue and fever  Respiratory: Negative for chest tightness and shortness of breath  Cardiovascular: Negative for chest pain  Gastrointestinal: Negative for abdominal pain, blood in stool, diarrhea, nausea and vomiting  Genitourinary: Negative for dysuria          Current Outpatient Medications   Medication Sig Dispense Refill    Cholecalciferol (VITAMIN D3) 3000 units TABS Take 1 tablet by mouth daily      hydrOXYzine pamoate (VISTARIL) 25 mg capsule TAKE 1 CAPSULE (25 MG TOTAL) BY MOUTH DAILY AT BEDTIME 30 capsule 2    Multiple Vitamin (MULTIVITAMIN) tablet Take 1 tablet by mouth daily      traZODone (DESYREL) 100 mg tablet Take 1 tablet (100 mg total) by mouth daily at bedtime 90 tablet 2    venlafaxine (EFFEXOR-XR) 150 mg 24 hr capsule Take 1 capsule (150 mg total) by mouth daily Take with 75mg cap for a total of 225mg daily  90 capsule 2    venlafaxine (EFFEXOR-XR) 75 mg 24 hr capsule Take 1 capsule (75 mg total) by mouth daily Take with 150mg cap for a total of 225mg daily  90 capsule 2    busPIRone (BUSPAR) 15 mg tablet Take 1 tablet (15 mg total) by mouth 2 (two) times a day 60 tablet 1     No current facility-administered medications for this visit  Objective:  /70   Pulse (!) 110   Temp 98 2 °F (36 8 °C)   Resp 16   Ht 5' 1" (1 549 m)   Wt 88 7 kg (195 lb 8 oz)   LMP 01/01/2016 (Within Years)   SpO2 98%   BMI 36 94 kg/m²    Wt Readings from Last 3 Encounters:   05/26/21 88 7 kg (195 lb 8 oz)   12/18/20 94 3 kg (208 lb)   07/07/20 94 6 kg (208 lb 8 9 oz)      BP Readings from Last 3 Encounters:   05/26/21 112/70   07/07/20 128/80   07/06/20 116/72          Physical Exam  Vitals signs and nursing note reviewed  Constitutional:       Appearance: Normal appearance  She is well-developed  She is obese  HENT:      Head: Normocephalic and atraumatic  Right Ear: Tympanic membrane, ear canal and external ear normal       Left Ear: Tympanic membrane, ear canal and external ear normal       Nose: Nose normal       Right Sinus: No maxillary sinus tenderness or frontal sinus tenderness  Left Sinus: No maxillary sinus tenderness or frontal sinus tenderness  Mouth/Throat:      Mouth: Mucous membranes are moist       Pharynx: Oropharynx is clear  Uvula midline  Tonsils: No tonsillar exudate  Eyes:      Extraocular Movements: Extraocular movements intact  Conjunctiva/sclera: Conjunctivae normal       Pupils: Pupils are equal, round, and reactive to light  Neck:      Musculoskeletal: Neck supple  Thyroid: No thyromegaly  Cardiovascular:      Rate and Rhythm: Normal rate and regular rhythm  Pulses: Normal pulses  Heart sounds: Normal heart sounds  Pulmonary:      Effort: Pulmonary effort is normal       Breath sounds: Normal breath sounds     Abdominal: General: Bowel sounds are normal  There is no distension  Palpations: Abdomen is soft  There is no mass  Tenderness: There is no abdominal tenderness  There is no guarding or rebound  Musculoskeletal:         General: No swelling or tenderness  Right lower leg: No edema  Left lower leg: No edema  Lymphadenopathy:      Cervical: No cervical adenopathy  Skin:     General: Skin is warm and dry  Findings: No rash  Neurological:      General: No focal deficit present  Mental Status: She is alert and oriented to person, place, and time  Cranial Nerves: No cranial nerve deficit  Sensory: No sensory deficit  Motor: No weakness  Coordination: Coordination normal       Deep Tendon Reflexes: Reflexes normal    Psychiatric:         Mood and Affect: Mood normal          Behavior: Behavior normal          Thought Content: Thought content normal          Judgment: Judgment normal          Lab Results:      Lab Results   Component Value Date    WBC 9 01 06/04/2019    HGB 12 2 06/04/2019    HCT 38 5 06/04/2019     06/04/2019    TRIG 77 06/04/2019    HDL 48 06/04/2019    ALT 36 06/30/2020    AST 18 06/30/2020    K 4 7 06/30/2020     06/30/2020    CREATININE 0 95 06/30/2020    BUN 13 06/30/2020    CO2 28 06/30/2020    GLUF 97 05/14/2020    HGBA1C 5 3 06/04/2019     No results found for: URICACID  Invalid input(s): BASENAME Vitamin D    No results found  POCT Labs      BMI Counseling: Body mass index is 36 94 kg/m²  The BMI is above normal  Nutrition recommendations include encouraging healthy choices of fruits and vegetables  Exercise recommendations include exercising 3-5 times per week  No pharmacotherapy was ordered  Depression Screening and Follow-up Plan: Patient's depression screening was positive with a PHQ-2 score of 2  Their PHQ-9 score was 8  Clincally patient does not have depression  No treatment is required       Tobacco Cessation Counseling: Tobacco cessation counseling was provided  The patient is sincerely urged to quit consumption of tobacco  She is not ready to quit tobacco  Medication options and side effects of medication not discussed  Patient agreed to medication

## 2021-05-26 NOTE — TELEPHONE ENCOUNTER
Patient seen in PCP office today, Wednesday, 5/26/21  She has been calling your office as her Ditropan 5mg BID Rx by [facility] was discontinued per her pharmacy, but has not been able to connect with your staff  Please contact patient as she would like Rx refilled

## 2021-05-26 NOTE — ASSESSMENT & PLAN NOTE
Stable s Rx  For headache and migraine prevention I would suggest a combination vitamin supplement which may include 1 or more of the following ingredients:  Magnesium 400 mg , Riboflavin (vitamin B2) 400 - 600 mg,  Butterbur 150 mg (PA free)  Other ingredients that may be helpful are feverfew, coenzyme Q10 100 mg three times a day,  melatonin and pamela  Some example brands that combine some of these ingredients are Migravent or Dolovent

## 2021-05-26 NOTE — PATIENT INSTRUCTIONS
Wellness Visit for Adults   AMBULATORY CARE:   A wellness visit  is when you see your healthcare provider to get screened for health problems  Your healthcare provider will also give you advice on how to stay healthy  Write down your questions so you remember to ask them  Ask your healthcare provider how often you should have a wellness visit  What happens at a wellness visit:  Your healthcare provider will ask about your health, and your family history of health problems  This includes high blood pressure, heart disease, and cancer  He or she will ask if you have symptoms that concern you, if you smoke, and about your mood  You may also be asked about your intake of medicines, supplements, food, and alcohol  Any of the following may be done:  · Your weight  will be checked  Your height may also be checked so your body mass index (BMI) can be calculated  Your BMI shows if you are at a healthy weight  · Your blood pressure  and heart rate will be checked  Your temperature may also be checked  · Blood and urine tests  may be done  Blood tests may be done to check your cholesterol levels  Abnormal cholesterol levels increase your risk for heart disease and stroke  You may also need a blood or urine test to check for diabetes if you are at increased risk  Urine tests may be done to look for signs of an infection or kidney disease  · A physical exam  includes checking your heartbeat and lungs with a stethoscope  Your healthcare provider may also check your skin to look for sun damage  · Screening tests  may be recommended  A screening test is done to check for diseases that may not cause symptoms  The screening tests you may need depend on your age, gender, family history, and lifestyle habits  For example, colorectal screening may be recommended if you are 48years old or older  Screening tests you need if you are a woman:   · A Pap smear  is used to screen for cervical cancer   Pap smears are usually done every 3 to 5 years depending on your age  You may need them more often if you have had abnormal Pap smear test results in the past  Ask your healthcare provider how often you should have a Pap smear  · A mammogram  is an x-ray of your breasts to screen for breast cancer  Experts recommend mammograms every 2 years starting at age 48 years  You may need a mammogram at age 52 years or younger if you have an increased risk for breast cancer  Talk to your healthcare provider about when you should start having mammograms and how often you need them  Vaccines you may need:   · Get an influenza vaccine  every year  The influenza vaccine protects you from the flu  Several types of viruses cause the flu  The viruses change over time, so new vaccines are made each year  · Get a tetanus-diphtheria (Td) booster vaccine  every 10 years  This vaccine protects you against tetanus and diphtheria  Tetanus is a severe infection that may cause painful muscle spasms and lockjaw  Diphtheria is a severe bacterial infection that causes a thick covering in the back of your mouth and throat  · Get a human papillomavirus (HPV) vaccine  if you are female and aged 23 to 32 or male 23 to 24 and never received it  This vaccine protects you from HPV infection  HPV is the most common infection spread by sexual contact  HPV may also cause vaginal, penile, and anal cancers  · Get a pneumococcal vaccine  if you are aged 72 years or older  The pneumococcal vaccine is an injection given to protect you from pneumococcal disease  Pneumococcal disease is an infection caused by pneumococcal bacteria  The infection may cause pneumonia, meningitis, or an ear infection  · Get a shingles vaccine  if you are 60 or older, even if you have had shingles before  The shingles vaccine is an injection to protect you from the varicella-zoster virus  This is the same virus that causes chickenpox   Shingles is a painful rash that develops in people who had chickenpox or have been exposed to the virus  How to eat healthy:  My Plate is a model for planning healthy meals  It shows the types and amounts of foods that should go on your plate  Fruits and vegetables make up about half of your plate, and grains and protein make up the other half  A serving of dairy is included on the side of your plate  The amount of calories and serving sizes you need depends on your age, gender, weight, and height  Examples of healthy foods are listed below:  · Eat a variety of vegetables  such as dark green, red, and orange vegetables  You can also include canned vegetables low in sodium (salt) and frozen vegetables without added butter or sauces  · Eat a variety of fresh fruits , canned fruit in 100% juice, frozen fruit, and dried fruit  · Include whole grains  At least half of the grains you eat should be whole grains  Examples include whole-wheat bread, wheat pasta, brown rice, and whole-grain cereals such as oatmeal     · Eat a variety of protein foods such as seafood (fish and shellfish), lean meat, and poultry without skin (turkey and chicken)  Examples of lean meats include pork leg, shoulder, or tenderloin, and beef round, sirloin, tenderloin, and extra lean ground beef  Other protein foods include eggs and egg substitutes, beans, peas, soy products, nuts, and seeds  · Choose low-fat dairy products such as skim or 1% milk or low-fat yogurt, cheese, and cottage cheese  · Limit unhealthy fats  such as butter, hard margarine, and shortening  Exercise:  Exercise at least 30 minutes per day on most days of the week  Some examples of exercise include walking, biking, dancing, and swimming  You can also fit in more physical activity by taking the stairs instead of the elevator or parking farther away from stores  Include muscle strengthening activities 2 days each week  Regular exercise provides many health benefits   It helps you manage your weight, and decreases your risk for type 2 diabetes, heart disease, stroke, and high blood pressure  Exercise can also help improve your mood  Ask your healthcare provider about the best exercise plan for you  General health and safety guidelines:   · Do not smoke  Nicotine and other chemicals in cigarettes and cigars can cause lung damage  Ask your healthcare provider for information if you currently smoke and need help to quit  E-cigarettes or smokeless tobacco still contain nicotine  Talk to your healthcare provider before you use these products  · Limit alcohol  A drink of alcohol is 12 ounces of beer, 5 ounces of wine, or 1½ ounces of liquor  · Lose weight, if needed  Being overweight increases your risk of certain health conditions  These include heart disease, high blood pressure, type 2 diabetes, and certain types of cancer  · Protect your skin  Do not sunbathe or use tanning beds  Use sunscreen with a SPF 15 or higher  Apply sunscreen at least 15 minutes before you go outside  Reapply sunscreen every 2 hours  Wear protective clothing, hats, and sunglasses when you are outside  · Drive safely  Always wear your seatbelt  Make sure everyone in your car wears a seatbelt  A seatbelt can save your life if you are in an accident  Do not use your cell phone when you are driving  This could distract you and cause an accident  Pull over if you need to make a call or send a text message  · Practice safe sex  Use latex condoms if are sexually active and have more than one partner  Your healthcare provider may recommend screening tests for sexually transmitted infections (STIs)  · Wear helmets, lifejackets, and protective gear  Always wear a helmet when you ride a bike or motorcycle, go skiing, or play sports that could cause a head injury  Wear protective equipment when you play sports  Wear a lifejacket when you are on a boat or doing water sports      © Copyright [employer] 2020 Information is for End User's use only and may not be sold, redistributed or otherwise used for commercial purposes  All illustrations and images included in CareNotes® are the copyrighted property of [employer]  or Susie Herrera  The above information is an  only  It is not intended as medical advice for individual conditions or treatments  Talk to your doctor, nurse or pharmacist before following any medical regimen to see if it is safe and effective for you  Obesity   AMBULATORY CARE:   Obesity  is when your body mass index (BMI) is greater than 30  Your healthcare provider will use your height and weight to measure your BMI  The risks of obesity include  many health problems, such as injuries or physical disability  You may need tests to check for the following:  · Diabetes    · High blood pressure or high cholesterol    · Heart disease    · Gallbladder or liver disease    · Cancer of the colon, breast, prostate, liver, or kidney    · Sleep apnea    · Arthritis or gout    Seek care immediately if:   · You have a severe headache, confusion, or difficulty speaking  · You have weakness on one side of your body  · You have chest pain, sweating, or shortness of breath  Contact your healthcare provider if:   · You have symptoms of gallbladder or liver disease, such as pain in your upper abdomen  · You have knee or hip pain and discomfort while walking  · You have symptoms of diabetes, such as intense hunger and thirst, and frequent urination  · You have symptoms of sleep apnea, such as snoring or daytime sleepiness  · You have questions or concerns about your condition or care  Treatment for obesity  focuses on helping you lose weight to improve your health  Even a small decrease in BMI can reduce the risk for many health problems  Your healthcare provider will help you set a weight-loss goal   · Lifestyle changes  are the first step in treating obesity   These include making healthy food choices and getting regular physical activity  Your healthcare provider may suggest a weight-loss program that involves coaching, education, and therapy  · Medicine  may help you lose weight when it is used with a healthy diet and physical activity  · Surgery  can help you lose weight if you are very obese and have other health problems  There are several types of weight-loss surgery  Ask your healthcare provider for more information  Be successful losing weight:   · Set small, realistic goals  An example of a small goal is to walk for 20 minutes 5 days a week  Anther goal is to lose 5% of your body weight  · Tell friends, family members, and coworkers about your goals  and ask for their support  Ask a friend to lose weight with you, or join a weight-loss support group  · Identify foods or triggers that may cause you to overeat , and find ways to avoid them  Remove tempting high-calorie foods from your home and workplace  Place a bowl of fresh fruit on your kitchen counter  If stress causes you to eat, then find other ways to cope with stress  · Keep a diary to track what you eat and drink  Also write down how many minutes of physical activity you do each day  Weigh yourself once a week and record it in your diary  Eating changes: You will need to eat 500 to 1,000 fewer calories each day than you currently eat to lose 1 to 2 pounds a week  The following changes will help you cut calories:  · Eat smaller portions  Use small plates, no larger than 9 inches in diameter  Fill your plate half full of fruits and vegetables  Measure your food using measuring cups until you know what a serving size looks like  · Eat 3 meals and 1 or 2 snacks each day  Plan your meals in advance  Kevon Thomas and eat at home most of the time  Eat slowly  Do not skip meals  Skipping meals can lead to overeating later in the day  This can make it harder for you to lose weight   Talk with a dietitian to help you make a meal plan and schedule that is right for you  · Eat fruits and vegetables at every meal   They are low in calories and high in fiber, which makes you feel full  Do not add butter, margarine, or cream sauce to vegetables  Use herbs to season steamed vegetables  · Eat less fat and fewer fried foods  Eat more baked or grilled chicken and fish  These protein sources are lower in calories and fat than red meat  Limit fast food  Dress your salads with olive oil and vinegar instead of bottled dressing  · Limit the amount of sugar you eat  Do not drink sugary beverages  Limit alcohol  Activity changes:  Physical activity is good for your body in many ways  It helps you burn calories and build strong muscles  It decreases stress and depression, and improves your mood  It can also help you sleep better  Talk to your healthcare provider before you begin an exercise program   · Exercise for at least 30 minutes 5 days a week  Start slowly  Set aside time each day for physical activity that you enjoy and that is convenient for you  It is best to do both weight training and an activity that increases your heart rate, such as walking, bicycling, or swimming  · Find ways to be more active  Do yard work and housecleaning  Walk up the stairs instead of using elevators  Spend your leisure time going to events that require walking, such as outdoor festivals or fairs  This extra physical activity can help you lose weight and keep it off  Follow up with your healthcare provider as directed: You may need to meet with a dietitian  Write down your questions so you remember to ask them during your visits  © Copyright [address] Information is for End User's use only and may not be sold, redistributed or otherwise used for commercial purposes   All illustrations and images included in CareNotes® are the copyrighted property of [employer] Inc  or [facility] Petros Hays   The above information is an  only  It is not intended as medical advice for individual conditions or treatments  Talk to your doctor, nurse or pharmacist before following any medical regimen to see if it is safe and effective for you  Cigarette Smoking and Your Health   AMBULATORY CARE:   Risks to your health if you smoke:  Nicotine and other chemicals found in tobacco and e-cigarettes can damage every cell in your body  Even if you are a light smoker, you have an increased risk for cancer, heart disease, and lung disease  If you are pregnant or have diabetes, smoking increases your risk for complications  Nicotine can affect an adolescent's developing brain  This can lead to trouble thinking, learning, or paying attention  Benefits to your health if you stop smoking:   · You decrease respiratory symptoms such as coughing, wheezing, and shortness of breath  · You reduce your risk for cancers of the lung, mouth, throat, kidney, bladder, pancreas, stomach, and cervix  If you already have cancer, you increase the benefits of chemotherapy  You also reduce your risk for cancer returning or a second cancer from developing  · You reduce your risk for heart disease, blood clots, heart attack, and stroke  · You reduce your risk for lung infections, and diseases such as pneumonia, asthma, chronic bronchitis, and emphysema  · Your circulation improves  More oxygen can be delivered to your body  If you have diabetes, you lower your risk for complications, such as kidney, artery, and eye diseases  You also lower your risk for nerve damage  Nerve damage can lead to amputations, poor vision, and blindness  · You improve your body's ability to heal and to fight infections  · An adolescent can help his or her brain and body develop in a healthy way  Talk to your adolescent about all the health risks of nicotine  If you can, start talking about nicotine when your child is younger than 12 years   This may make it easier for him or her not to start using nicotine as a teenager or adult  Explain to him or her that it is best never to start  It can be hard to try to quit later  Benefits to the health of others if you stop smoking:  Tobacco is harmful to nonsmokers who breathe in your secondhand smoke  The following are ways the health of others around you may improve when you stop smoking:  · You lower the risks for lung cancer and heart disease in nonsmoking adults  · If you are pregnant, you lower the risk for miscarriage, early delivery, low birth weight, and stillbirth  You also lower your baby's risk for SIDS, obesity, developmental delay, and neurobehavioral problems, such as ADHD  · If you have children, you lower their risk for ear infections, colds, pneumonia, bronchitis, and asthma  Follow up with your doctor as directed:  Write down your questions so you remember to ask them during your visits  For more information and support to stop smoking:   · [employer]  Phone: 3- 780 - 307-0624  Web Address: [employer]  [address] Information is for End User's use only and may not be sold, redistributed or otherwise used for commercial purposes  All illustrations and images included in CareNotes® are the copyrighted property of [employer] Inc  or [address] [place] [address]  The above information is an  only  It is not intended as medical advice for individual conditions or treatments  Talk to your doctor, nurse or pharmacist before following any medical regimen to see if it is safe and effective for you  Cholesterol and Your Health   AMBULATORY CARE:   Cholesterol  is a waxy, fat-like substance  Your body uses cholesterol to make hormones and new cells, and to protect nerves  Cholesterol is made by your body  It also comes from certain foods you eat, such as meat and dairy products  Your healthcare provider can help you set goals for your cholesterol levels   He or she can help you create a plan to meet your goals  Cholesterol level goals: Your cholesterol level goals depend on your risk for heart disease, your age, and your other health conditions  The following are general guidelines:  · Total cholesterol  includes low-density lipoprotein (LDL), high-density lipoprotein (HDL), and triglyceride levels  The total cholesterol level should be lower than 200 mg/dL and is best at about 150 mg/dL  · LDL cholesterol  is called bad cholesterol  because it forms plaque in your arteries  As plaque builds up, your arteries become narrow, and less blood flows through  When plaque decreases blood flow to your heart, you may have chest pain  If plaque completely blocks an artery that brings blood to your heart, you may have a heart attack  Plaque can break off and form blood clots  Blood clots may block arteries in your brain and cause a stroke  The level should be less than 130 mg/dL and is best at about 100 mg/dL  · HDL cholesterol  is called good cholesterol  because it helps remove LDL cholesterol from your arteries  It does this by attaching to LDL cholesterol and carrying it to your liver  Your liver breaks down LDL cholesterol so your body can get rid of it  High levels of HDL cholesterol can help prevent a heart attack and stroke  Low levels of HDL cholesterol can increase your risk for heart disease, heart attack, and stroke  The level should be 60 mg/dL or higher  · Triglycerides  are a type of fat that store energy from foods you eat  High levels of triglycerides also cause plaque buildup  This can increase your risk for a heart attack or stroke  If your triglyceride level is high, your LDL cholesterol level may also be high  The level should be less than 150 mg/dL      What increases your risk for high cholesterol:   · Smoking cigarettes    · Being overweight or obese, or not getting enough exercise    · Drinking large amounts of alcohol    · A medical condition such as hypertension (high blood pressure) or diabetes    · Certain genes passed from your parents to you    · Age older than 65 years    What you need to know about having your cholesterol levels checked: Adults 21to 39years of age should have their cholesterol levels checked every 4 to 6 years  Adults 45 years or older should have their cholesterol checked every 1 to 2 years  You may need your cholesterol checked more often, or at a younger age, if you have risk factors for heart disease  You may also need to have your cholesterol checked more often if you have other health conditions, such as diabetes  Blood tests are used to check cholesterol levels  Blood tests measure your levels of triglycerides, LDL cholesterol, and HDL cholesterol  How healthy fats affect your cholesterol levels:  Healthy fats, also called unsaturated fats, help lower LDL cholesterol and triglyceride levels  Healthy fats include the following:  · Monounsaturated fats  are found in foods such as olive oil, canola oil, avocado, nuts, and olives  · Polyunsaturated fats,  such as omega 3 fats, are found in fish, such as salmon, trout, and tuna  They can also be found in plant foods such as flaxseed, walnuts, and soybeans  How unhealthy fats affect your cholesterol levels:  Unhealthy fats increase LDL cholesterol and triglyceride levels  They are found in foods high in cholesterol, saturated fat, and trans fat:  · Cholesterol  is found in eggs, dairy, and meat  · Saturated fat  is found in butter, cheese, ice cream, whole milk, and coconut oil  Saturated fat is also found in meat, such as sausage, hot dogs, and bologna  · Trans fat  is found in liquid oils and is used in fried and baked foods  Foods that contain trans fats include chips, crackers, muffins, sweet rolls, microwave popcorn, and cookies  Treatment  for high cholesterol will also decrease your risk of heart disease, heart attack, and stroke   Treatment may include any of the following:  · Lifestyle changes  may include food, exercise, weight loss, and quitting smoking  You may also need to decrease the amount of alcohol you drink  Your healthcare provider will want you to start with lifestyle changes  Other treatment may be added if lifestyle changes are not enough  · Medicines  may be given to lower your LDL cholesterol, triglyceride levels, or total cholesterol level  You may need medicines to lower your cholesterol if any of the following is true:     ? You have a history of stroke, TIA, unstable angina, or a heart attack  ? Your LDL cholesterol level is 190 mg/dL or higher  ? You are age 36 to 76 years, have diabetes or heart disease risk factors, and your LDL cholesterol is 70 mg/dL or higher  · Supplements  include fish oil, red yeast rice, and garlic  Fish oil may help lower your triglyceride and LDL cholesterol levels  It may also increase your HDL cholesterol level  Red yeast rice may help decrease your total cholesterol level and LDL cholesterol level  Garlic may help lower your total cholesterol level  Do not take these supplements without talking to your healthcare provider  Food changes you can make to lower your cholesterol levels:  A dietitian can help you create a healthy eating plan  He or she can show you how to read food labels and choose foods low in saturated fat, trans fats, and cholesterol  · Decrease the total amount of fat you eat  Choose lean meats, fat-free or 1% fat milk, and low-fat dairy products, such as yogurt and cheese  Try to limit or avoid red meats  Limit or do not eat fried foods or baked goods, such as cookies  · Replace unhealthy fats with healthy fats  Cook foods in olive oil or canola oil  Choose soft margarines that are low in saturated fat and trans fat  Seeds, nuts, and avocados are other examples of healthy fats  · Eat foods with omega-3 fats  Examples include salmon, tuna, mackerel, walnuts, and flaxseed  Eat fish 2 times per week  Pregnant women should not eat fish that have high levels of mercury, such as shark, swordfish, and cristhian mackerel  · Increase the amount of high-fiber foods you eat  High-fiber foods can help lower your LDL cholesterol  Aim to get between 20 and 30 grams of fiber each day  Fruits and vegetables are high in fiber  Eat at least 5 servings each day  Other high-fiber foods are whole-grain or whole-wheat breads, pastas, or cereals, and brown rice  Eat 3 ounces of whole-grain foods each day  Increase fiber slowly  You may have abdominal discomfort, bloating, and gas if you add fiber to your diet too quickly  · Eat healthy protein foods  Examples include low-fat dairy products, skinless chicken and turkey, fish, and nuts  · Limit foods and drinks that are high in sugar  Your dietitian or healthcare provider can help you create daily limits for high-sugar foods and drinks  The limit may be lower if you have diabetes or another health condition  Limits can also help you lose weight if needed  Lifestyle changes you can make to lower your cholesterol levels:   · Maintain a healthy weight  Ask your healthcare provider what a healthy weight is for you  Ask him or her to help you create a weight loss plan if needed  Weight loss can decrease your total cholesterol and triglyceride levels  Weight loss may also help keep your blood pressure at a healthy level  · Exercise regularly  Exercise can help lower your total cholesterol level and maintain a healthy weight  Exercise can also help increase your HDL cholesterol level  Work with your healthcare provider to create an exercise program that is right for you  Get at least 30 to 40 minutes of moderate exercise most days of the week  Examples of exercise include brisk walking, swimming, or biking  · Do not smoke  Nicotine and other chemicals in cigarettes and cigars can raise your cholesterol levels   Ask your healthcare provider for information if you currently smoke and need help to quit  E-cigarettes or smokeless tobacco still contain nicotine  Talk to your healthcare provider before you use these products  · Limit or do not drink alcohol  Alcohol can increase your triglyceride levels  Ask your healthcare provider before you drink alcohol  Ask how much is okay for you to drink in 1 day or 1 week  © Copyright [address] Information is for End User's use only and may not be sold, redistributed or otherwise used for commercial purposes  All illustrations and images included in CareNotes® are the copyrighted property of [employer]  or [address] [place] [address]  The above information is an  only  It is not intended as medical advice for individual conditions or treatments  Talk to your doctor, nurse or pharmacist before following any medical regimen to see if it is safe and effective for you  Please contact your insurance if you are uncertain of coverage for plan of care items  Your insurance may not cover the cost of your Vitamin D blood test, which is approximately $65-70  Please notify the lab prior to blood draw if you would like to decline this test       For headache and migraine prevention I would suggest a combination vitamin supplement which may include 1 or more of the following ingredients:  Magnesium 400 mg , Riboflavin (vitamin B2) 400 - 600 mg,  Butterbur 150 mg (PA free)  Other ingredients that may be helpful are feverfew, coenzyme Q10 100 mg three times a day,  melatonin and pamela  Some example brands that combine some of these ingredients are Migravent or Dolovent

## 2021-05-26 NOTE — ASSESSMENT & PLAN NOTE
Stable on Trazodone 100mg 1/2 pill QHS PRN  Continue Effexor  mg daily  Increase BuSpar 15mg t i d  Pending counseling intake

## 2021-06-17 ENCOUNTER — HOSPITAL ENCOUNTER (EMERGENCY)
Facility: HOSPITAL | Age: 31
Discharge: HOME | End: 2021-06-17
Attending: EMERGENCY MEDICINE | Admitting: EMERGENCY MEDICINE
Payer: COMMERCIAL

## 2021-06-17 ENCOUNTER — APPOINTMENT (EMERGENCY)
Dept: ULTRASOUND IMAGING | Facility: HOSPITAL | Age: 31
End: 2021-06-17
Payer: COMMERCIAL

## 2021-06-17 ENCOUNTER — APPOINTMENT (EMERGENCY)
Dept: CT IMAGING | Facility: HOSPITAL | Age: 31
End: 2021-06-17
Payer: COMMERCIAL

## 2021-06-17 VITALS
WEIGHT: 100.8 LBS | HEART RATE: 95 BPM | SYSTOLIC BLOOD PRESSURE: 110 MMHG | BODY MASS INDEX: 19.05 KG/M2 | RESPIRATION RATE: 18 BRPM | DIASTOLIC BLOOD PRESSURE: 55 MMHG | TEMPERATURE: 98.3 F | OXYGEN SATURATION: 100 %

## 2021-06-17 DIAGNOSIS — R10.32 LLQ PAIN: Primary | ICD-10-CM

## 2021-06-17 LAB
ALBUMIN SERPL BCP-MCNC: 3.8 G/DL (ref 3.5–5)
ALP SERPL-CCNC: 98 U/L (ref 46–116)
ALT SERPL W P-5'-P-CCNC: 39 U/L (ref 12–78)
ANION GAP SERPL CALCULATED.3IONS-SCNC: 9 MMOL/L (ref 4–13)
AST SERPL W P-5'-P-CCNC: 22 U/L (ref 5–45)
BASOPHILS # BLD AUTO: 0.06 THOUSANDS/ΜL (ref 0–0.1)
BASOPHILS NFR BLD AUTO: 1 % (ref 0–1)
BILIRUB SERPL-MCNC: 0.26 MG/DL (ref 0.2–1)
BILIRUB UR QL STRIP: NEGATIVE
BILIRUB UR QL STRIP: NEGATIVE
BUN SERPL-MCNC: 10 MG/DL (ref 5–25)
CALCIUM SERPL-MCNC: 9.2 MG/DL (ref 8.3–10.1)
CHLORIDE SERPL-SCNC: 104 MMOL/L (ref 100–108)
CLARITY UR: CLEAR
CLARITY UR: CLEAR
CO2 SERPL-SCNC: 26 MMOL/L (ref 21–32)
COLOR UR: YELLOW
COLOR UR: YELLOW
CREAT SERPL-MCNC: 0.73 MG/DL (ref 0.6–1.3)
EOSINOPHIL # BLD AUTO: 0.17 THOUSAND/ΜL (ref 0–0.61)
EOSINOPHIL NFR BLD AUTO: 2 % (ref 0–6)
ERYTHROCYTE [DISTWIDTH] IN BLOOD BY AUTOMATED COUNT: 13.9 % (ref 11.6–15.1)
EXT PREG TEST URINE: NEGATIVE
EXT. CONTROL ED NAV: NORMAL
GFR SERPL CREATININE-BSD FRML MDRD: 112 ML/MIN/1.73SQ M
GLUCOSE SERPL-MCNC: 92 MG/DL (ref 65–140)
GLUCOSE UR STRIP-MCNC: NEGATIVE MG/DL
GLUCOSE UR STRIP-MCNC: NEGATIVE MG/DL
HCT VFR BLD AUTO: 40.8 % (ref 34.8–46.1)
HGB BLD-MCNC: 12.8 G/DL (ref 11.5–15.4)
HGB UR QL STRIP.AUTO: NEGATIVE
HGB UR QL STRIP.AUTO: NEGATIVE
IMM GRANULOCYTES # BLD AUTO: 0.03 THOUSAND/UL (ref 0–0.2)
IMM GRANULOCYTES NFR BLD AUTO: 0 % (ref 0–2)
KETONES UR STRIP-MCNC: NEGATIVE MG/DL
KETONES UR STRIP-MCNC: NEGATIVE MG/DL
LEUKOCYTE ESTERASE UR QL STRIP: NEGATIVE
LEUKOCYTE ESTERASE UR QL STRIP: NEGATIVE
LIPASE SERPL-CCNC: 84 U/L (ref 73–393)
LYMPHOCYTES # BLD AUTO: 2.88 THOUSANDS/ΜL (ref 0.6–4.47)
LYMPHOCYTES NFR BLD AUTO: 30 % (ref 14–44)
MCH RBC QN AUTO: 27.4 PG (ref 26.8–34.3)
MCHC RBC AUTO-ENTMCNC: 31.4 G/DL (ref 31.4–37.4)
MCV RBC AUTO: 87 FL (ref 82–98)
MONOCYTES # BLD AUTO: 0.88 THOUSAND/ΜL (ref 0.17–1.22)
MONOCYTES NFR BLD AUTO: 9 % (ref 4–12)
NEUTROPHILS # BLD AUTO: 5.71 THOUSANDS/ΜL (ref 1.85–7.62)
NEUTS SEG NFR BLD AUTO: 58 % (ref 43–75)
NITRITE UR QL STRIP: NEGATIVE
NITRITE UR QL STRIP: NEGATIVE
NRBC BLD AUTO-RTO: 0 /100 WBCS
PH UR STRIP.AUTO: 8.5 [PH] (ref 4.5–8)
PH UR STRIP.AUTO: 8.5 [PH] (ref 4.5–8)
PLATELET # BLD AUTO: 315 THOUSANDS/UL (ref 149–390)
PMV BLD AUTO: 9.7 FL (ref 8.9–12.7)
POTASSIUM SERPL-SCNC: 4.3 MMOL/L (ref 3.5–5.3)
PROT SERPL-MCNC: 7.8 G/DL (ref 6.4–8.2)
PROT UR STRIP-MCNC: NEGATIVE MG/DL
PROT UR STRIP-MCNC: NEGATIVE MG/DL
RBC # BLD AUTO: 4.68 MILLION/UL (ref 3.81–5.12)
SODIUM SERPL-SCNC: 139 MMOL/L (ref 136–145)
SP GR UR STRIP.AUTO: 1.02 (ref 1–1.03)
SP GR UR STRIP.AUTO: 1.02 (ref 1–1.03)
UROBILINOGEN UR QL STRIP.AUTO: 0.2 E.U./DL
UROBILINOGEN UR QL STRIP.AUTO: 0.2 E.U./DL
WBC # BLD AUTO: 9.73 THOUSAND/UL (ref 4.31–10.16)

## 2021-06-17 PROCEDURE — 81003 URINALYSIS AUTO W/O SCOPE: CPT

## 2021-06-17 PROCEDURE — 81025 URINE PREGNANCY TEST: CPT | Performed by: PHYSICIAN ASSISTANT

## 2021-06-17 PROCEDURE — G1004 CDSM NDSC: HCPCS

## 2021-06-17 PROCEDURE — 99284 EMERGENCY DEPT VISIT MOD MDM: CPT

## 2021-06-17 PROCEDURE — 80053 COMPREHEN METABOLIC PANEL: CPT | Performed by: EMERGENCY MEDICINE

## 2021-06-17 PROCEDURE — 76830 TRANSVAGINAL US NON-OB: CPT

## 2021-06-17 PROCEDURE — 74177 CT ABD & PELVIS W/CONTRAST: CPT

## 2021-06-17 PROCEDURE — 96374 THER/PROPH/DIAG INJ IV PUSH: CPT

## 2021-06-17 PROCEDURE — 85025 COMPLETE CBC W/AUTO DIFF WBC: CPT | Performed by: EMERGENCY MEDICINE

## 2021-06-17 PROCEDURE — 83690 ASSAY OF LIPASE: CPT | Performed by: EMERGENCY MEDICINE

## 2021-06-17 PROCEDURE — 36415 COLL VENOUS BLD VENIPUNCTURE: CPT

## 2021-06-17 PROCEDURE — 99284 EMERGENCY DEPT VISIT MOD MDM: CPT | Performed by: PHYSICIAN ASSISTANT

## 2021-06-17 PROCEDURE — 76856 US EXAM PELVIC COMPLETE: CPT

## 2021-06-17 RX ORDER — KETOROLAC TROMETHAMINE 30 MG/ML
15 INJECTION, SOLUTION INTRAMUSCULAR; INTRAVENOUS ONCE
Status: COMPLETED | OUTPATIENT
Start: 2021-06-17 | End: 2021-06-17

## 2021-06-17 RX ORDER — IBUPROFEN 600 MG/1
600 TABLET ORAL EVERY 6 HOURS PRN
Qty: 20 TABLET | Refills: 0 | Status: SHIPPED | OUTPATIENT
Start: 2021-06-17

## 2021-06-17 RX ADMIN — IOHEXOL 100 ML: 350 INJECTION, SOLUTION INTRAVENOUS at 16:46

## 2021-06-17 RX ADMIN — KETOROLAC TROMETHAMINE 15 MG: 30 INJECTION, SOLUTION INTRAMUSCULAR at 15:40

## 2021-06-17 NOTE — ED PROVIDER NOTES
History  Chief Complaint   Patient presents with    Abdominal Pain     Pt reports 4/10 stabbing pain in left low abd occasionally radiating to left back  Denies urinary complaints  - n/v/d     Ousmane Randolph is a 31 y o  female who presents to the ED with complaints of left lower quadrant pain radiating to her back since 0100 this a m  Channing Castillo Patient reports stabbing intermittent pain and nausea when pain is at its worst   Patient has had a previous hysterectomy in 2016 secondary to endometriosis and does suffer from bladder spasm however reports that this pain is different  Denies vomiting, diarrhea, constipation, blood in stool, vaginal discharge, vaginal bleeding, dysuria, urinary frequency, urinary urgency, hematuria, chest pain, shortness of breath, fever, chills  Patient states she took Tylenol prior to arrival without improvement of symptoms  History provided by:  Patient  Abdominal Pain  Pain location:  LLQ  Pain quality: stabbing    Duration:  14 hours  Context: previous surgery    Context: not alcohol use, not sick contacts and not suspicious food intake    Associated symptoms: nausea    Associated symptoms: no anorexia, no chest pain, no chills, no constipation, no cough, no diarrhea, no dysuria, no fatigue, no fever, no hematuria, no shortness of breath, no sore throat, no vaginal bleeding, no vaginal discharge and no vomiting        Prior to Admission Medications   Prescriptions Last Dose Informant Patient Reported? Taking?    Cholecalciferol (VITAMIN D3) 3000 units TABS   Yes No   Sig: Take 1 tablet by mouth daily   Multiple Vitamin (MULTIVITAMIN) tablet   Yes No   Sig: Take 1 tablet by mouth daily   busPIRone (BUSPAR) 15 mg tablet   No No   Sig: Take 1 tablet (15 mg total) by mouth 2 (two) times a day   hydrOXYzine pamoate (VISTARIL) 25 mg capsule   No No   Sig: TAKE 1 CAPSULE (25 MG TOTAL) BY MOUTH DAILY AT BEDTIME   traZODone (DESYREL) 100 mg tablet   No No   Sig: Take 1 tablet (100 mg total) by mouth daily at bedtime   venlafaxine (EFFEXOR-XR) 150 mg 24 hr capsule   No No   Sig: Take 1 capsule (150 mg total) by mouth daily Take with 75mg cap for a total of 225mg daily  venlafaxine (EFFEXOR-XR) 75 mg 24 hr capsule   No No   Sig: Take 1 capsule (75 mg total) by mouth daily Take with 150mg cap for a total of 225mg daily        Facility-Administered Medications: None       Past Medical History:   Diagnosis Date    Anxiety 2019    Bladder spasm 2021    Chronic pelvic pain in female     Resolved 2017     Chronic vaginitis     Resolved 2017     Class 1 obesity without serious comorbidity with body mass index (BMI) of 32 0 to 32 9 in adult 2019    Class 1 obesity without serious comorbidity with body mass index (BMI) of 34 0 to 34 9 in adult 2019    Class 2 obesity without serious comorbidity with body mass index (BMI) of 39 0 to 39 9 in adult 2019    COVID-19 2020    Depression 2019    Dysmenorrhea     Resolved 2017     Dyspareunia in female     Resolved 2017     Endometriosis of left broad ligament     Resolved 8/3/2017     Episode of recurrent major depressive disorder ([facility] [address] ) 2019    Headache(784 0)     Hot flashes     Resolved 2017     Migraine     Migraine without status migrainosus, not intractable 2021    Obesity 2019    Other insomnia 2020    Smoker 2021    Vitamin D deficiency        Past Surgical History:   Procedure Laterality Date     SECTION       x 1 - twins    HYSTERECTOMY  2017    Endometriosis - Still has ovaries    SALPINGECTOMY Bilateral 2017    Endometriosis    TUBAL LIGATION  2016    WISDOM TOOTH EXTRACTION         Family History   Problem Relation Age of Onset    Hypertension Father     Migraines Father     Hyperlipidemia Father     No Known Problems Mother     No Known Problems Brother     No Known Problems Son     No Known Problems Son     Depression Paternal Grandmother     Cancer Paternal Grandmother         Type Unknown     I have reviewed and agree with the history as documented  E-Cigarette/Vaping    E-Cigarette Use Never User      E-Cigarette/Vaping Substances    Nicotine No     THC No     CBD No     Flavoring No     Other No     Unknown No      Social History     Tobacco Use    Smoking status: Current Every Day Smoker     Packs/day: 0 25     Years: 7 00     Pack years: 1 75     Types: Cigarettes     Start date: 11/14/2010    Smokeless tobacco: Never Used    Tobacco comment: 4-5 cigarettes daily    Vaping Use    Vaping Use: Never used   Substance Use Topics    Alcohol use: Yes     Alcohol/week: 2 0 standard drinks     Types: 2 Glasses of wine per week     Comment: occasionally     Drug use: No       Review of Systems   Constitutional: Negative for appetite change, chills, fatigue, fever and unexpected weight change  HENT: Negative for congestion, drooling, ear pain, rhinorrhea, sore throat, trouble swallowing and voice change  Eyes: Negative for pain, discharge, redness and visual disturbance  Respiratory: Negative for cough, shortness of breath, wheezing and stridor  Cardiovascular: Negative for chest pain, palpitations and leg swelling  Gastrointestinal: Positive for abdominal pain and nausea  Negative for anorexia, blood in stool, constipation, diarrhea and vomiting  Genitourinary: Negative for dysuria, flank pain, frequency, hematuria, urgency, vaginal bleeding and vaginal discharge  Musculoskeletal: Negative for gait problem, joint swelling, neck pain and neck stiffness  Skin: Negative for color change and rash  Neurological: Negative for dizziness, seizures, light-headedness and headaches  Physical Exam  Physical Exam  Vitals and nursing note reviewed  Constitutional:       Appearance: She is well-developed  HENT:      Head: Normocephalic and atraumatic        Nose: Nose normal    Eyes:      Conjunctiva/sclera: Conjunctivae normal       Pupils: Pupils are equal, round, and reactive to light  Cardiovascular:      Rate and Rhythm: Normal rate and regular rhythm  Pulmonary:      Effort: Pulmonary effort is normal       Breath sounds: Normal breath sounds  Abdominal:      General: Abdomen is flat  Bowel sounds are normal       Palpations: Abdomen is soft  Tenderness: There is abdominal tenderness  There is no right CVA tenderness, left CVA tenderness, guarding or rebound  Musculoskeletal:         General: Normal range of motion  Skin:     General: Skin is warm and dry  Capillary Refill: Capillary refill takes less than 2 seconds  Neurological:      Mental Status: She is alert and oriented to person, place, and time           Vital Signs  ED Triage Vitals [06/17/21 1317]   Temperature Pulse Respirations Blood Pressure SpO2   98 3 °F (36 8 °C) 98 18 135/79 97 %      Temp Source Heart Rate Source Patient Position - Orthostatic VS BP Location FiO2 (%)   Oral Monitor Lying Right arm --      Pain Score       4           Vitals:    06/17/21 1317 06/17/21 1624 06/17/21 1630   BP: 135/79 110/55 110/55   Pulse: 98 95    Patient Position - Orthostatic VS: Lying Lying          Visual Acuity      ED Medications  Medications   ketorolac (TORADOL) injection 15 mg (15 mg Intravenous Given 6/17/21 1540)   iohexol (OMNIPAQUE) 350 MG/ML injection (MULTI-DOSE) 100 mL (100 mL Intravenous Given 6/17/21 1646)       Diagnostic Studies  Results Reviewed     Procedure Component Value Units Date/Time    POCT pregnancy, urine [[id number]]  (Normal) Resulted: 06/17/21 1540    Lab Status: Final result Updated: 06/17/21 1540     EXT PREG TEST UR (Ref: Negative) negative     Control valid    Urine Macroscopic, POC [[id number]]  (Abnormal) Collected: 06/17/21 1538    Lab Status: Final result Specimen: Urine Updated: 06/17/21 1539     Color, UA Yellow     Clarity, UA Clear     pH, UA 8 5     Leukocytes, UA Negative     Nitrite, UA Negative     Protein, UA Negative mg/dl      Glucose, UA Negative mg/dl      Ketones, UA Negative mg/dl      Urobilinogen, UA 0 2 E U /dl      Bilirubin, UA Negative     Blood, UA Negative     Specific Gravity, UA 1 020    Narrative:      [facility] RESULT    Comprehensive metabolic panel [[id number]] Collected: 06/17/21 1350    Lab Status: Final result Specimen: Blood from Arm, Right Updated: 06/17/21 1418     Sodium 139 mmol/L      Potassium 4 3 mmol/L      Chloride 104 mmol/L      CO2 26 mmol/L      ANION GAP 9 mmol/L      BUN 10 mg/dL      Creatinine 0 73 mg/dL      Glucose 92 mg/dL      Calcium 9 2 mg/dL      AST 22 U/L      ALT 39 U/L      Alkaline Phosphatase 98 U/L      Total Protein 7 8 g/dL      Albumin 3 8 g/dL      Total Bilirubin 0 26 mg/dL      eGFR 112 ml/min/1 73sq m     Narrative:      [facility] guidelines for Chronic Kidney Disease (CKD):     Stage 1 with normal or high GFR (GFR > 90 mL/min/1 73 square meters)    Stage 2 Mild CKD (GFR = 60-89 mL/min/1 73 square meters)    Stage 3A Moderate CKD (GFR = 45-59 mL/min/1 73 square meters)    Stage 3B Moderate CKD (GFR = 30-44 mL/min/1 73 square meters)    Stage 4 Severe CKD (GFR = 15-29 mL/min/1 73 square meters)    Stage 5 End Stage CKD (GFR <15 mL/min/1 73 square meters)  Note: GFR calculation is accurate only with a steady state creatinine    Lipase [[id number]]  (Normal) Collected: 06/17/21 1350    Lab Status: Final result Specimen: Blood from Arm, Right Updated: 06/17/21 1418     Lipase 84 u/L     CBC and differential [[id number]] Collected: 06/17/21 1350    Lab Status: Final result Specimen: Blood from Arm, Right Updated: 06/17/21 1401     WBC 9 73 Thousand/uL      RBC 4 68 Million/uL      Hemoglobin 12 8 g/dL      Hematocrit 40 8 %      MCV 87 fL      MCH 27 4 pg      MCHC 31 4 g/dL      RDW 13 9 %      MPV 9 7 fL      Platelets 968 Thousands/uL      nRBC 0 /100 WBCs      Neutrophils Relative 58 %      Immat GRANS % 0 % Lymphocytes Relative 30 %      Monocytes Relative 9 %      Eosinophils Relative 2 %      Basophils Relative 1 %      Neutrophils Absolute 5 71 Thousands/µL      Immature Grans Absolute 0 03 Thousand/uL      Lymphocytes Absolute 2 88 Thousands/µL      Monocytes Absolute 0 88 Thousand/µL      Eosinophils Absolute 0 17 Thousand/µL      Basophils Absolute 0 06 Thousands/µL     Urine Macroscopic, POC [[id number]]  (Abnormal) Collected: 06/17/21 1355    Lab Status: Final result Specimen: Urine Updated: 06/17/21 1357     Color, UA Yellow     Clarity, UA Clear     pH, UA 8 5     Leukocytes, UA Negative     Nitrite, UA Negative     Protein, UA Negative mg/dl      Glucose, UA Negative mg/dl      Ketones, UA Negative mg/dl      Urobilinogen, UA 0 2 E U /dl      Bilirubin, UA Negative     Blood, UA Negative     Specific Gravity, UA 1 020    Narrative:      [facility] RESULT                 CT abdomen pelvis with contrast   Final Result by Chlaino Olea DO (06/17 1745)      No acute intra-abdominal abnormality  Workstation performed: [id number]         US pelvis complete w transvaginal   Final Result by Chalino Olea DO (06/17 1542)       No evidence of ovarian torsion  Workstation performed: [id number]                    Procedures  Procedures         ED Course  ED Course as of Jun 17 1829   Thu Jun 17, 2021   1754 Educated patient regarding diagnosis and management  Advised patient to follow up with PCP  Advised patient to RTER for persistent or worsening symptoms  MDM  Number of Diagnoses or Management Options  LLQ pain: new and requires workup  Diagnosis management comments: UA normal   Labs within normal limits  Transvaginal ultrasound without acute findings but significant for dominant follicle of the left ovary  CT Abdomen and Pelvis without acute findings  Patient is feeling improved after IV Toradol    Patient will follow-up outpatient gynecology  I provided patient with strict RTER precautions  I advised patient follow-up with PCP in 24-48 hours  Patient verbalized understanding  Amount and/or Complexity of Data Reviewed  Clinical lab tests: reviewed and ordered  Tests in the radiology section of CPT®: ordered and reviewed  Review and summarize past medical records: yes    Patient Progress  Patient progress: stable      Disposition  Final diagnoses:   LLQ pain     Time reflects when diagnosis was documented in both MDM as applicable and the Disposition within this note     Time User Action Codes Description Comment    6/17/2021  5:52 PM Berry Dire Add [R10 32] LLQ pain       ED Disposition     ED Disposition Condition Date/Time Comment    Discharge Stable Thu Jun 17, 2021  5:52 PM Cheryl Plate discharge to home/self care  Follow-up Information     Follow up With Specialties Details Why Contact Info Additional Information    [facility] Emergency Medicine Go to  If symptoms worsen [address] Emergency Department, [address] [place] [address], [address] [place] [address] Family Medicine Call   [address] [place] [address]  [phone]       Your OBGYN  Schedule an appointment as soon as possible for a visit              Patient's Medications   Discharge Prescriptions    IBUPROFEN (MOTRIN) 600 MG TABLET    Take 1 tablet (600 mg total) by mouth every 6 (six) hours as needed for mild pain or moderate pain       Start Date: 6/17/2021 End Date: --       Order Dose: 600 mg       Quantity: 20 tablet    Refills: 0     No discharge procedures on file      PDMP Review     None          ED Provider  Electronically Signed by           Dom Mandujano PA-C  06/17/21 7284

## 2021-06-17 NOTE — DISCHARGE INSTRUCTIONS
Abdominal Pain   WHAT YOU NEED TO KNOW:   Abdominal pain can be dull, achy, or sharp  You may have pain in one area of your abdomen, or in your entire abdomen  Your pain may be caused by a condition such as constipation, food sensitivity or poisoning, infection, or a blockage  Abdominal pain can also be from a hernia, appendicitis, or an ulcer  Liver, gallbladder, or kidney conditions can also cause abdominal pain  The cause of your abdominal pain may be unknown  DISCHARGE INSTRUCTIONS:   Return to the emergency department if:   · You have new chest pain or shortness of breath  · You have pulsing pain in your upper abdomen or lower back that suddenly becomes constant  · Your pain is in the right lower abdominal area and worsens with movement  · You have a fever over 100 4°F (38°C) or shaking chills  · You are vomiting and cannot keep food or liquids down  · Your pain does not improve or gets worse over the next 8 to 12 hours  · You see blood in your vomit or bowel movements, or they look black and tarry  · Your skin or the whites of your eyes turn yellow  · You are a woman and have a large amount of vaginal bleeding that is not your monthly period  Contact your healthcare provider if:   · You have pain in your lower back  · You are a man and have pain in your testicles  · You have pain when you urinate  · You have questions or concerns about your condition or care  Follow up with your healthcare provider within 24 hours or as directed:  Write down your questions so you remember to ask them during your visits  Medicines:   · Medicines  may be given to calm your stomach and prevent vomiting or to decrease pain  Ask how to take pain medicine safely  · Take your medicine as directed  Contact your healthcare provider if you think your medicine is not helping or if you have side effects  Tell him of her if you are allergic to any medicine   Keep a list of the medicines, vitamins, and herbs you take  Include the amounts, and when and why you take them  Bring the list or the pill bottles to follow-up visits  Carry your medicine list with you in case of an emergency  © Copyright [address] Information is for End User's use only and may not be sold, redistributed or otherwise used for commercial purposes  All illustrations and images included in CareNotes® are the copyrighted property of [employer] , Inc  or [facility] Petros Hays   The above information is an  only  It is not intended as medical advice for individual conditions or treatments  Talk to your doctor, nurse or pharmacist before following any medical regimen to see if it is safe and effective for you

## 2021-06-23 RX ORDER — OXYBUTYNIN CHLORIDE 5 MG/1
5 TABLET ORAL 2 TIMES DAILY
Qty: 180 TABLET | Refills: 2 | Status: SHIPPED | OUTPATIENT
Start: 2021-06-23

## 2021-06-23 NOTE — TELEPHONE ENCOUNTER
Please reorder Ditropan for patient as long as she feels it is helping her    If her symptoms worsen she should make another appointment for the urology clinic

## 2021-10-14 ENCOUNTER — LAB (OUTPATIENT)
Dept: LAB | Facility: CLINIC | Age: 31
End: 2021-10-14
Payer: COMMERCIAL

## 2021-10-14 ENCOUNTER — PATIENT MESSAGE (OUTPATIENT)
Dept: FAMILY MEDICINE CLINIC | Facility: CLINIC | Age: 31
End: 2021-10-14

## 2021-10-14 DIAGNOSIS — Z13.6 SCREENING FOR CARDIOVASCULAR CONDITION: ICD-10-CM

## 2021-10-14 DIAGNOSIS — Z13.1 SCREENING FOR DIABETES MELLITUS: ICD-10-CM

## 2021-10-14 DIAGNOSIS — E55.9 VITAMIN D DEFICIENCY: ICD-10-CM

## 2021-10-14 DIAGNOSIS — E66.9 CLASS 2 OBESITY WITHOUT SERIOUS COMORBIDITY WITH BODY MASS INDEX (BMI) OF 36.0 TO 36.9 IN ADULT, UNSPECIFIED OBESITY TYPE: ICD-10-CM

## 2021-10-14 DIAGNOSIS — E66.9 OBESITY (BMI 35.0-39.9 WITHOUT COMORBIDITY): ICD-10-CM

## 2021-10-14 DIAGNOSIS — F41.9 ANXIETY: ICD-10-CM

## 2021-10-14 DIAGNOSIS — G43.909 MIGRAINE WITHOUT STATUS MIGRAINOSUS, NOT INTRACTABLE, UNSPECIFIED MIGRAINE TYPE: ICD-10-CM

## 2021-10-14 DIAGNOSIS — E66.01 SEVERE OBESITY (BMI 35.0-39.9) WITH COMORBIDITY (HCC): ICD-10-CM

## 2021-10-14 DIAGNOSIS — B34.9 VIRAL INFECTION, UNSPECIFIED: Primary | ICD-10-CM

## 2021-10-14 DIAGNOSIS — F33.9 EPISODE OF RECURRENT MAJOR DEPRESSIVE DISORDER, UNSPECIFIED DEPRESSION EPISODE SEVERITY (HCC): ICD-10-CM

## 2021-10-14 LAB
25(OH)D3 SERPL-MCNC: 19.9 NG/ML (ref 30–100)
ALBUMIN SERPL BCP-MCNC: 3.8 G/DL (ref 3.5–5)
ALP SERPL-CCNC: 102 U/L (ref 46–116)
ALT SERPL W P-5'-P-CCNC: 26 U/L (ref 12–78)
ANION GAP SERPL CALCULATED.3IONS-SCNC: 9 MMOL/L (ref 4–13)
AST SERPL W P-5'-P-CCNC: 13 U/L (ref 5–45)
BASOPHILS # BLD AUTO: 0.07 THOUSANDS/ΜL (ref 0–0.1)
BASOPHILS NFR BLD AUTO: 1 % (ref 0–1)
BILIRUB SERPL-MCNC: 0.22 MG/DL (ref 0.2–1)
BUN SERPL-MCNC: 15 MG/DL (ref 5–25)
CALCIUM SERPL-MCNC: 8.8 MG/DL (ref 8.3–10.1)
CHLORIDE SERPL-SCNC: 103 MMOL/L (ref 100–108)
CHOLEST SERPL-MCNC: 173 MG/DL (ref 50–200)
CO2 SERPL-SCNC: 27 MMOL/L (ref 21–32)
CREAT SERPL-MCNC: 1.07 MG/DL (ref 0.6–1.3)
EOSINOPHIL # BLD AUTO: 0.38 THOUSAND/ΜL (ref 0–0.61)
EOSINOPHIL NFR BLD AUTO: 4 % (ref 0–6)
ERYTHROCYTE [DISTWIDTH] IN BLOOD BY AUTOMATED COUNT: 13.5 % (ref 11.6–15.1)
EST. AVERAGE GLUCOSE BLD GHB EST-MCNC: 103 MG/DL
GFR SERPL CREATININE-BSD FRML MDRD: 71 ML/MIN/1.73SQ M
GLUCOSE P FAST SERPL-MCNC: 84 MG/DL (ref 65–99)
HBA1C MFR BLD: 5.2 %
HCT VFR BLD AUTO: 41.2 % (ref 34.8–46.1)
HDLC SERPL-MCNC: 43 MG/DL
HGB BLD-MCNC: 12.6 G/DL (ref 11.5–15.4)
IMM GRANULOCYTES # BLD AUTO: 0.04 THOUSAND/UL (ref 0–0.2)
IMM GRANULOCYTES NFR BLD AUTO: 0 % (ref 0–2)
LDLC SERPL CALC-MCNC: 95 MG/DL (ref 0–100)
LDLC SERPL DIRECT ASSAY-MCNC: 95 MG/DL (ref 0–100)
LYMPHOCYTES # BLD AUTO: 1.94 THOUSANDS/ΜL (ref 0.6–4.47)
LYMPHOCYTES NFR BLD AUTO: 20 % (ref 14–44)
MAGNESIUM SERPL-MCNC: 2.2 MG/DL (ref 1.6–2.6)
MCH RBC QN AUTO: 27.5 PG (ref 26.8–34.3)
MCHC RBC AUTO-ENTMCNC: 30.6 G/DL (ref 31.4–37.4)
MCV RBC AUTO: 90 FL (ref 82–98)
MONOCYTES # BLD AUTO: 0.89 THOUSAND/ΜL (ref 0.17–1.22)
MONOCYTES NFR BLD AUTO: 9 % (ref 4–12)
NEUTROPHILS # BLD AUTO: 6.53 THOUSANDS/ΜL (ref 1.85–7.62)
NEUTS SEG NFR BLD AUTO: 66 % (ref 43–75)
NONHDLC SERPL-MCNC: 130 MG/DL
NRBC BLD AUTO-RTO: 0 /100 WBCS
PLATELET # BLD AUTO: 337 THOUSANDS/UL (ref 149–390)
PMV BLD AUTO: 9.9 FL (ref 8.9–12.7)
POTASSIUM SERPL-SCNC: 4 MMOL/L (ref 3.5–5.3)
PROT SERPL-MCNC: 7.5 G/DL (ref 6.4–8.2)
RBC # BLD AUTO: 4.59 MILLION/UL (ref 3.81–5.12)
SODIUM SERPL-SCNC: 139 MMOL/L (ref 136–145)
TRIGL SERPL-MCNC: 175 MG/DL
TSH SERPL DL<=0.05 MIU/L-ACNC: 1.01 UIU/ML (ref 0.36–3.74)
WBC # BLD AUTO: 9.85 THOUSAND/UL (ref 4.31–10.16)

## 2021-10-14 PROCEDURE — 83036 HEMOGLOBIN GLYCOSYLATED A1C: CPT

## 2021-10-14 PROCEDURE — 83735 ASSAY OF MAGNESIUM: CPT

## 2021-10-14 PROCEDURE — 80061 LIPID PANEL: CPT

## 2021-10-14 PROCEDURE — 80053 COMPREHEN METABOLIC PANEL: CPT

## 2021-10-14 PROCEDURE — 85025 COMPLETE CBC W/AUTO DIFF WBC: CPT

## 2021-10-14 PROCEDURE — 84443 ASSAY THYROID STIM HORMONE: CPT

## 2021-10-14 PROCEDURE — 36415 COLL VENOUS BLD VENIPUNCTURE: CPT

## 2021-10-14 PROCEDURE — 83721 ASSAY OF BLOOD LIPOPROTEIN: CPT

## 2021-10-14 PROCEDURE — 82306 VITAMIN D 25 HYDROXY: CPT

## 2021-10-15 ENCOUNTER — TELEMEDICINE (OUTPATIENT)
Dept: FAMILY MEDICINE CLINIC | Facility: CLINIC | Age: 31
End: 2021-10-15
Payer: COMMERCIAL

## 2021-10-15 ENCOUNTER — TELEPHONE (OUTPATIENT)
Dept: ADMINISTRATIVE | Facility: OTHER | Age: 31
End: 2021-10-15

## 2021-10-15 VITALS — HEIGHT: 61 IN | BODY MASS INDEX: 36.82 KG/M2 | WEIGHT: 195 LBS

## 2021-10-15 DIAGNOSIS — F41.9 ANXIETY: ICD-10-CM

## 2021-10-15 DIAGNOSIS — Z11.59 NEED FOR HEPATITIS C SCREENING TEST: ICD-10-CM

## 2021-10-15 DIAGNOSIS — B34.9 VIRAL INFECTION, UNSPECIFIED: Primary | ICD-10-CM

## 2021-10-15 DIAGNOSIS — E66.9 CLASS 2 OBESITY WITHOUT SERIOUS COMORBIDITY WITH BODY MASS INDEX (BMI) OF 36.0 TO 36.9 IN ADULT, UNSPECIFIED OBESITY TYPE: ICD-10-CM

## 2021-10-15 PROBLEM — F17.200 SMOKER: Status: RESOLVED | Noted: 2021-05-26 | Resolved: 2021-10-15

## 2021-10-15 PROCEDURE — 99214 OFFICE O/P EST MOD 30 MIN: CPT | Performed by: FAMILY MEDICINE

## 2021-10-15 PROCEDURE — U0005 INFEC AGEN DETEC AMPLI PROBE: HCPCS | Performed by: FAMILY MEDICINE

## 2021-10-15 PROCEDURE — U0003 INFECTIOUS AGENT DETECTION BY NUCLEIC ACID (DNA OR RNA); SEVERE ACUTE RESPIRATORY SYNDROME CORONAVIRUS 2 (SARS-COV-2) (CORONAVIRUS DISEASE [COVID-19]), AMPLIFIED PROBE TECHNIQUE, MAKING USE OF HIGH THROUGHPUT TECHNOLOGIES AS DESCRIBED BY CMS-2020-01-R: HCPCS | Performed by: FAMILY MEDICINE

## 2021-10-15 RX ORDER — BUSPIRONE HYDROCHLORIDE 15 MG/1
15 TABLET ORAL 2 TIMES DAILY
Qty: 60 TABLET | Refills: 0 | Status: SHIPPED | OUTPATIENT
Start: 2021-10-15 | End: 2021-10-29 | Stop reason: SDUPTHER

## 2021-10-15 RX ORDER — ALBUTEROL SULFATE 90 UG/1
2 AEROSOL, METERED RESPIRATORY (INHALATION) EVERY 4 HOURS PRN
Qty: 18 G | Refills: 0 | Status: SHIPPED | OUTPATIENT
Start: 2021-10-15 | End: 2021-10-25

## 2021-10-16 LAB — SARS-COV-2 RNA RESP QL NAA+PROBE: NEGATIVE

## 2021-10-29 ENCOUNTER — TELEMEDICINE (OUTPATIENT)
Dept: FAMILY MEDICINE CLINIC | Facility: CLINIC | Age: 31
End: 2021-10-29
Payer: COMMERCIAL

## 2021-10-29 VITALS — BODY MASS INDEX: 36.82 KG/M2 | WEIGHT: 195 LBS | HEIGHT: 61 IN

## 2021-10-29 DIAGNOSIS — E55.9 VITAMIN D DEFICIENCY: ICD-10-CM

## 2021-10-29 DIAGNOSIS — N32.89 BLADDER SPASM: ICD-10-CM

## 2021-10-29 DIAGNOSIS — G47.09 OTHER INSOMNIA: ICD-10-CM

## 2021-10-29 DIAGNOSIS — E66.9 CLASS 2 OBESITY WITHOUT SERIOUS COMORBIDITY WITH BODY MASS INDEX (BMI) OF 36.0 TO 36.9 IN ADULT, UNSPECIFIED OBESITY TYPE: ICD-10-CM

## 2021-10-29 DIAGNOSIS — F41.9 ANXIETY: Primary | ICD-10-CM

## 2021-10-29 DIAGNOSIS — Z12.4 CERVICAL CANCER SCREENING: ICD-10-CM

## 2021-10-29 DIAGNOSIS — G43.909 MIGRAINE WITHOUT STATUS MIGRAINOSUS, NOT INTRACTABLE, UNSPECIFIED MIGRAINE TYPE: ICD-10-CM

## 2021-10-29 DIAGNOSIS — F33.9 EPISODE OF RECURRENT MAJOR DEPRESSIVE DISORDER, UNSPECIFIED DEPRESSION EPISODE SEVERITY (HCC): ICD-10-CM

## 2021-10-29 PROCEDURE — 3725F SCREEN DEPRESSION PERFORMED: CPT | Performed by: FAMILY MEDICINE

## 2021-10-29 PROCEDURE — 1036F TOBACCO NON-USER: CPT | Performed by: FAMILY MEDICINE

## 2021-10-29 PROCEDURE — 3008F BODY MASS INDEX DOCD: CPT | Performed by: FAMILY MEDICINE

## 2021-10-29 PROCEDURE — 99214 OFFICE O/P EST MOD 30 MIN: CPT | Performed by: FAMILY MEDICINE

## 2021-10-29 RX ORDER — ERGOCALCIFEROL (VITAMIN D2) 1250 MCG
50000 CAPSULE ORAL WEEKLY
Qty: 12 CAPSULE | Refills: 0 | Status: SHIPPED | OUTPATIENT
Start: 2021-10-29 | End: 2022-01-15

## 2021-10-29 RX ORDER — TRAZODONE HYDROCHLORIDE 100 MG/1
100 TABLET ORAL
Qty: 90 TABLET | Refills: 2 | Status: SHIPPED | OUTPATIENT
Start: 2021-10-29

## 2021-10-29 RX ORDER — BUSPIRONE HYDROCHLORIDE 15 MG/1
15 TABLET ORAL 2 TIMES DAILY
Qty: 180 TABLET | Refills: 2 | Status: SHIPPED | OUTPATIENT
Start: 2021-10-29

## 2021-10-29 RX ORDER — VILAZODONE HYDROCHLORIDE 20 MG/1
20 TABLET ORAL
Qty: 30 TABLET | Refills: 0 | Status: SHIPPED | OUTPATIENT
Start: 2021-10-29 | End: 2021-11-01

## 2021-11-01 ENCOUNTER — TELEPHONE (OUTPATIENT)
Dept: FAMILY MEDICINE CLINIC | Facility: CLINIC | Age: 31
End: 2021-11-01

## 2021-11-01 DIAGNOSIS — F33.0 MILD EPISODE OF RECURRENT MAJOR DEPRESSIVE DISORDER (HCC): Primary | ICD-10-CM

## 2021-11-08 ENCOUNTER — TELEPHONE (OUTPATIENT)
Dept: FAMILY MEDICINE CLINIC | Facility: CLINIC | Age: 31
End: 2021-11-08

## 2022-01-14 ENCOUNTER — TELEPHONE (OUTPATIENT)
Dept: FAMILY MEDICINE CLINIC | Facility: CLINIC | Age: 32
End: 2022-01-14

## 2022-01-14 DIAGNOSIS — B34.9 VIRAL INFECTION, UNSPECIFIED: ICD-10-CM

## 2022-01-14 DIAGNOSIS — Z20.822 EXPOSURE TO COVID-19 VIRUS: ICD-10-CM

## 2022-01-14 PROCEDURE — U0003 INFECTIOUS AGENT DETECTION BY NUCLEIC ACID (DNA OR RNA); SEVERE ACUTE RESPIRATORY SYNDROME CORONAVIRUS 2 (SARS-COV-2) (CORONAVIRUS DISEASE [COVID-19]), AMPLIFIED PROBE TECHNIQUE, MAKING USE OF HIGH THROUGHPUT TECHNOLOGIES AS DESCRIBED BY CMS-2020-01-R: HCPCS | Performed by: FAMILY MEDICINE

## 2022-01-14 PROCEDURE — U0005 INFEC AGEN DETEC AMPLI PROBE: HCPCS | Performed by: FAMILY MEDICINE

## 2022-01-14 NOTE — TELEPHONE ENCOUNTER
Patient called with the following Covid-19 concern:    Patient was exposed to Covid-19? yes  Date of last exposure to Covid positive person? 1/9/22    Patient has the following symptoms: headache, stuffy nose, cough  Symptom onset date: 1/12/22    Is the patient a hospital employee?  no  Has patient been vaccinated? yes   Has the patient had there booster (3rd dose)? yes     If the patient was vaccinated, when was their last COVID vaccine? 11/2022    Will route the following message as HIGH priority to a provider currently in the office for review

## 2022-02-18 ENCOUNTER — RA CDI HCC (OUTPATIENT)
Dept: OTHER | Facility: HOSPITAL | Age: 32
End: 2022-02-18

## 2022-02-18 NOTE — PROGRESS NOTES
[facility] [address]  coding opportunities      Chart Reviewed * (Number of) Inbasket suggestions sent to Provider: 2            Patients insurance company: [employer] (Medicare Advantage and Commercial)          Appt on 2/28/22    Refer to 10/29/21 when it was last assessed     1) F33 9: Episode of recurrent major depressive disorder, unspecified depression episode severity ([facility] [address] )    2) E66 01: Morbid (severe) obesity due to excess calories ([facility] [address] )     Per CMS/ICD 10 coding guidelines, to be used when BMI > 35 & <40 with one or more comorbidity (DM, HTN, GERD or LINN)

## 2022-03-09 ENCOUNTER — RA CDI HCC (OUTPATIENT)
Dept: OTHER | Facility: HOSPITAL | Age: 32
End: 2022-03-09

## 2022-03-11 PROBLEM — E66.01 MORBID (SEVERE) OBESITY DUE TO EXCESS CALORIES (HCC): Status: ACTIVE | Noted: 2019-06-04

## 2024-03-22 ENCOUNTER — OFFICE VISIT (OUTPATIENT)
Dept: FAMILY MEDICINE CLINIC | Facility: CLINIC | Age: 34
End: 2024-03-22
Payer: COMMERCIAL

## 2024-03-22 VITALS
RESPIRATION RATE: 16 BRPM | TEMPERATURE: 97.7 F | HEIGHT: 62 IN | BODY MASS INDEX: 40.15 KG/M2 | HEART RATE: 96 BPM | SYSTOLIC BLOOD PRESSURE: 122 MMHG | WEIGHT: 218.2 LBS | DIASTOLIC BLOOD PRESSURE: 74 MMHG | OXYGEN SATURATION: 98 %

## 2024-03-22 DIAGNOSIS — E66.01 MORBID OBESITY (HCC): ICD-10-CM

## 2024-03-22 DIAGNOSIS — Z13.6 SCREENING FOR CARDIOVASCULAR CONDITION: ICD-10-CM

## 2024-03-22 DIAGNOSIS — E66.01 MORBID OBESITY WITH BMI OF 40.0-44.9, ADULT (HCC): ICD-10-CM

## 2024-03-22 DIAGNOSIS — K21.9 GASTROESOPHAGEAL REFLUX DISEASE, UNSPECIFIED WHETHER ESOPHAGITIS PRESENT: ICD-10-CM

## 2024-03-22 DIAGNOSIS — G47.09 OTHER INSOMNIA: ICD-10-CM

## 2024-03-22 DIAGNOSIS — F33.1 MODERATE EPISODE OF RECURRENT MAJOR DEPRESSIVE DISORDER (HCC): ICD-10-CM

## 2024-03-22 DIAGNOSIS — Z11.59 NEED FOR HEPATITIS C SCREENING TEST: ICD-10-CM

## 2024-03-22 DIAGNOSIS — N32.89 BLADDER SPASM: ICD-10-CM

## 2024-03-22 DIAGNOSIS — E55.9 VITAMIN D DEFICIENCY: ICD-10-CM

## 2024-03-22 DIAGNOSIS — Z23 ENCOUNTER FOR IMMUNIZATION: ICD-10-CM

## 2024-03-22 DIAGNOSIS — F33.0 MILD EPISODE OF RECURRENT MAJOR DEPRESSIVE DISORDER (HCC): ICD-10-CM

## 2024-03-22 DIAGNOSIS — Z00.00 ANNUAL PHYSICAL EXAM: Primary | ICD-10-CM

## 2024-03-22 DIAGNOSIS — G43.909 MIGRAINE WITHOUT STATUS MIGRAINOSUS, NOT INTRACTABLE, UNSPECIFIED MIGRAINE TYPE: ICD-10-CM

## 2024-03-22 DIAGNOSIS — F41.9 ANXIETY: ICD-10-CM

## 2024-03-22 DIAGNOSIS — M54.50 ACUTE BILATERAL LOW BACK PAIN WITHOUT SCIATICA: ICD-10-CM

## 2024-03-22 PROCEDURE — 90715 TDAP VACCINE 7 YRS/> IM: CPT

## 2024-03-22 PROCEDURE — 99395 PREV VISIT EST AGE 18-39: CPT | Performed by: FAMILY MEDICINE

## 2024-03-22 PROCEDURE — 90471 IMMUNIZATION ADMIN: CPT

## 2024-03-22 PROCEDURE — 99213 OFFICE O/P EST LOW 20 MIN: CPT | Performed by: FAMILY MEDICINE

## 2024-03-22 RX ORDER — OMEPRAZOLE 20 MG/1
20 CAPSULE, DELAYED RELEASE ORAL 2 TIMES DAILY
Qty: 60 CAPSULE | Refills: 1 | Status: SHIPPED | OUTPATIENT
Start: 2024-03-22

## 2024-03-22 NOTE — PATIENT INSTRUCTIONS
Please contact your insurance if you are uncertain of coverage for plan of care items.  Your insurance may not cover the cost of your Vitamin D blood test, which is approximately $65-70.  Please notify the lab prior to blood draw if you would like to decline this test.      You may use Tylenol (Acetaminophen) up to 3,000mg daily (in 24 hours) as needed for pain or fever.  GERD (Gastroesophageal Reflux Disease)   AMBULATORY CARE:   Gastroesophageal reflux disease (GERD)  is reflux that happens more than 2 times a week for a few weeks. Reflux means acid and food in your stomach back up into your esophagus. GERD can cause other health problems over time if it is not treated.       Common causes of GERD:  GERD often happens because the lower muscle (sphincter) of the esophagus does not close properly. The sphincter normally opens to let food into the stomach. It then closes to keep food and stomach acid in the stomach. If the sphincter does not close properly, stomach acid and food back up (reflux) into the esophagus. The following may increase your risk for GERD:  Certain foods such as spicy foods, chocolate, foods that contain caffeine, peppermint, and fried foods    Hiatal hernia    Certain medicines such as calcium channel blockers (used to treat high blood pressure), allergy medicines, sedatives, or antidepressants    Pregnancy, obesity, or scleroderma    Lying down after a meal    Drinking alcohol or smoking cigarettes    Signs and symptoms:   Heartburn (burning pain in your chest)    Pain after meals that spreads to your neck, jaw, or shoulder    Pain that gets better when you change positions    Bitter or acid taste in your mouth    A dry cough    Trouble swallowing or pain with swallowing    Hoarseness or a sore throat    Burping or hiccups    Feeling full soon after you start eating    Call your local emergency number (911 in the [place]) if:   You have severe chest pain and sudden trouble breathing.      Seek care  immediately if:   You have trouble breathing after you vomit.    You have trouble swallowing, or pain with swallowing.    Your bowel movements are black, bloody, or tarry-looking.    Your vomit looks like coffee grounds or has blood in it.    Call your doctor or gastroenterologist if:   You feel full and cannot burp or vomit.    You vomit large amounts, or you vomit often.    You are losing weight without trying.    Your symptoms get worse or do not improve with treatment.    You have questions or concerns about your condition or care.    Treatment for GERD:   Medicines  are used to decrease stomach acid. Medicine may also be used to help your lower esophageal sphincter and stomach contract (tighten) more.    Surgery  is done to wrap the upper part of the stomach around the esophageal sphincter. This will strengthen the sphincter and prevent reflux.    Manage GERD:       Do not have foods or drinks that may increase heartburn.  These include chocolate, peppermint, fried or fatty foods, drinks that contain caffeine, or carbonated drinks (soda). Other foods include spicy foods, onions, tomatoes, and tomato-based foods. Do not have foods or drinks that can irritate your esophagus, such as citrus fruits, juices, and alcohol.    Do not eat large meals.  When you eat a lot of food at one time, your stomach needs more acid to digest it. Eat 6 small meals each day instead of 3 large meals, and eat slowly. Do not eat meals 2 to 3 hours before bedtime.    Elevate the head of your bed.  Place 6-inch blocks under the head of your bed frame. You may also use more than one pillow under your head and shoulders while you sleep.    Maintain a healthy weight.  If you are overweight, weight loss may help relieve symptoms of GERD.    Do not smoke.  Smoking weakens the lower esophageal sphincter and increases the risk of GERD. Ask your healthcare provider for information if you currently smoke and need help to quit. E-cigarettes or  smokeless tobacco still contain nicotine. Talk to your healthcare provider before you use these products.    Do not put pressure on your abdomen.  Pressure pushes acid up into your esophagus. Do not wear clothing that is tight around your waist. Do not bend over. Bend at the knees if you need to pick something up.  Follow up with your doctor or gastroenterologist as directed:  Write down your questions so you remember to ask them during your visits.  © Copyright [employer] 2023 Information is for End User's use only and may not be sold, redistributed or otherwise used for commercial purposes.  The above information is an  only. It is not intended as medical advice for individual conditions or treatments. Talk to your doctor, nurse or pharmacist before following any medical regimen to see if it is safe and effective for you.        Obesity   AMBULATORY CARE:   Obesity  means your body mass index (BMI) is greater than 30. Your healthcare provider will use your age, height, and weight to measure your BMI.  The risks of obesity include  many health problems, including injuries or physical disability.  Diabetes (high blood sugar level)    High blood pressure or high cholesterol    Heart disease or heart failure    Stroke    Gallbladder or liver disease    Cancer of the colon, breast, prostate, liver, or kidney    Sleep apnea    Arthritis or gout    Screening  is done to check for health conditions before you have signs or symptoms. If you are 35 to 70 years old, your blood sugar level may be checked every 3 years for signs of prediabetes or diabetes. Your healthcare provider will check your blood pressure at each visit. High blood pressure can lead to a stroke or other problems. Your provider may check for signs of heart disease, cancer, or other health problems.  Seek care immediately if:   You have a severe headache, confusion, or difficulty speaking.    You have weakness on one side of your body.    You  have chest pain, sweating, or shortness of breath.    Call your doctor if:   You have symptoms of gallbladder or liver disease, such as pain in your upper abdomen.    You have knee or hip pain and discomfort while walking.    You have symptoms of diabetes, such as intense hunger and thirst, and frequent urination.    You have symptoms of sleep apnea, such as snoring or daytime sleepiness.    You have questions or concerns about your condition or care.    Treatment for obesity  focuses on helping you lose weight to improve your health. Even a small decrease in BMI can reduce the risk for many health problems. Your healthcare provider will help you set a weight-loss goal.  Lifestyle changes  are the first step in treating obesity. These include making healthy food choices and getting regular physical activity. Your healthcare provider may suggest a weight-loss program that involves coaching, education, and therapy.    Medicine  may help you lose weight when it is used with a healthy foods and physical activity.    Surgery  can help you lose weight if you have obesity along with other health problems. Several types of weight-loss surgery are available. Ask your healthcare provider for more information.    Tips for safe weight loss:   Set small, realistic goals.  An example of a small goal is to walk for 20 minutes 5 days a week. Anther goal is to lose 5% of your body weight.    Ask for support.  Tell friends, family members, and coworkers about your goals. Ask someone to lose weight with you. You may also want to join a weight-loss support group.    Identify foods or triggers that may cause you to overeat.  Remove tempting high-calorie foods from your home and workplace. Place a bowl of fresh fruit on your kitchen counter. If stress causes you to eat, find other ways to cope with stress. A counselor or therapist may be able to help you.    Track your daily calories and activity.  Write down what you eat and drink. Also  write down how many minutes of physical activity you do each day.     Track your weekly weight.  Weigh yourself in the morning, before you eat or drink anything but after you use the bathroom. Use the same scale, in the same place, and in similar clothing each time. Only weigh yourself 1 to 2 times each week, or as directed. You may become discouraged if you weigh yourself every day.    Eating changes:  You will need to eat 500 to 1,000 fewer calories each day than you currently eat to lose 1 to 2 pounds a week. The following changes will help you cut calories:  Eat smaller portions.  Use small plates, no larger than 9 inches in diameter. Fill your plate half full of fruits and vegetables. Measure your food using measuring cups until you know what a serving size looks like.         Eat 3 meals and 1 or 2 snacks each day.  Plan your meals in advance. Cook and eat at home most of the time. Eat slowly. Do not skip meals. Skipping meals can lead to overeating later in the day. This can make it harder for you to lose weight. Talk with a dietitian to help you make a meal plan and schedule that is right for you.    Eat fruits and vegetables at every meal.  They are low in calories and high in fiber, which makes you feel full. Do not add butter, margarine, or cream sauce to vegetables. Use herbs to season steamed vegetables.    Eat less fat and fewer fried foods.  Eat more baked or grilled chicken and fish. These protein sources are lower in calories and fat than red meat. Limit fast food. Dress your salads with olive oil and vinegar instead of bottled dressing.    Limit the amount of sugar you eat.  Do not drink sugary beverages. Limit alcohol.       Activity changes:  Physical activity is good for your body in many ways. It helps you burn calories and build strong muscles. It decreases stress and depression, and improves your mood. It can also help you sleep better. Talk to your healthcare provider before you begin an  exercise program.  Exercise for at least 30 minutes 5 days a week.  Start slowly. Set aside time each day for physical activity that you enjoy and that is convenient for you. It is best to do both weight training and an activity that increases your heart rate, such as walking, bicycling, or swimming.            Find ways to be more active.  Do yard work and housecleaning. Walk up the stairs instead of using elevators. Spend your leisure time going to events that require walking, such as outdoor festivals or fairs. This extra physical activity can help you lose weight and keep it off.       Follow up with your doctor as directed:  You may need to meet with a dietitian. Write down your questions so you remember to ask them during your visits.  © Copyright [employer] 2023 Information is for End User's use only and may not be sold, redistributed or otherwise used for commercial purposes.  The above information is an  only. It is not intended as medical advice for individual conditions or treatments. Talk to your doctor, nurse or pharmacist before following any medical regimen to see if it is safe and effective for you.    Wellness Visit for Adults   AMBULATORY CARE:   A wellness visit  is when you see your healthcare provider to get screened for health problems. Your healthcare provider will also give you advice on how to stay healthy. Write down your questions so you remember to ask them. Ask your healthcare provider how often you should have a wellness visit.  What happens at a wellness visit:  Your healthcare provider will ask about your health, and your family history of health problems. This includes high blood pressure, heart disease, and cancer. He or she will ask if you have symptoms that concern you, if you smoke, and about your mood. You may also be asked about your intake of medicines, supplements, food, and alcohol. Any of the following may be done:  Your weight  will be checked. Your height may  also be checked so your body mass index (BMI) can be calculated. Your BMI shows if you are at a healthy weight.    Your blood pressure  and heart rate will be checked. Your temperature may also be checked.    Blood and urine tests  may be done. Blood tests may be done to check your cholesterol levels. Abnormal cholesterol levels increase your risk for heart disease and stroke. You may also need a blood or urine test to check for diabetes if you are at increased risk. Urine tests may be done to look for signs of an infection or kidney disease.    A physical exam  includes checking your heartbeat and lungs with a stethoscope. Your healthcare provider may also check your skin to look for sun damage.    Screening tests  may be recommended. A screening test is done to check for diseases that may not cause symptoms. The screening tests you may need depend on your age, gender, family history, and lifestyle habits. For example, colorectal screening may be recommended if you are 50 years old or older.    Screening tests you need if you are a woman:   A Pap smear  is used to screen for cervical cancer. Pap smears are usually done every 3 to 5 years depending on your age. You may need them more often if you have had abnormal Pap smear test results in the past. Ask your healthcare provider how often you should have a Pap smear.    A mammogram  is an x-ray of your breasts to screen for breast cancer. Experts recommend mammograms every 2 years starting at age 50 years. You may need a mammogram at age 49 years or younger if you have an increased risk for breast cancer. Talk to your healthcare provider about when you should start having mammograms and how often you need them.    Vaccines you may need:   Get an influenza vaccine  every year. The influenza vaccine protects you from the flu. Several types of viruses cause the flu. The viruses change over time, so new vaccines are made each year.    Get a tetanus-diphtheria (Td)  booster vaccine  every 10 years. This vaccine protects you against tetanus and diphtheria. Tetanus is a severe infection that may cause painful muscle spasms and lockjaw. Diphtheria is a severe bacterial infection that causes a thick covering in the back of your mouth and throat.    Get a human papillomavirus (HPV) vaccine  if you are female and aged 19 to 26 or male 19 to 21 and never received it. This vaccine protects you from HPV infection. HPV is the most common infection spread by sexual contact. HPV may also cause vaginal, penile, and anal cancers.    Get a pneumococcal vaccine  if you are aged 65 years or older. The pneumococcal vaccine is an injection given to protect you from pneumococcal disease. Pneumococcal disease is an infection caused by pneumococcal bacteria. The infection may cause pneumonia, meningitis, or an ear infection.    Get a shingles vaccine  if you are 60 or older, even if you have had shingles before. The shingles vaccine is an injection to protect you from the varicella-zoster virus. This is the same virus that causes chickenpox. Shingles is a painful rash that develops in people who had chickenpox or have been exposed to the virus.    How to eat healthy:  My Plate is a model for planning healthy meals. It shows the types and amounts of foods that should go on your plate. Fruits and vegetables make up about half of your plate, and grains and protein make up the other half. A serving of dairy is included on the side of your plate. The amount of calories and serving sizes you need depends on your age, gender, weight, and height. Examples of healthy foods are listed below:  Eat a variety of vegetables  such as dark green, red, and orange vegetables. You can also include canned vegetables low in sodium (salt) and frozen vegetables without added butter or sauces.    Eat a variety of fresh fruits , canned fruit in 100% juice, frozen fruit, and dried fruit.    Include whole grains.  At least  half of the grains you eat should be whole grains. Examples include whole-wheat bread, wheat pasta, brown rice, and whole-grain cereals such as oatmeal.    Eat a variety of protein foods such as seafood (fish and shellfish), lean meat, and poultry without skin (turkey and chicken). Examples of lean meats include pork leg, shoulder, or tenderloin, and beef round, sirloin, tenderloin, and extra lean ground beef. Other protein foods include eggs and egg substitutes, beans, peas, soy products, nuts, and seeds.    Choose low-fat dairy products such as skim or 1% milk or low-fat yogurt, cheese, and cottage cheese.    Limit unhealthy fats  such as butter, hard margarine, and shortening.       Exercise:  Exercise at least 30 minutes per day on most days of the week. Some examples of exercise include walking, biking, dancing, and swimming. You can also fit in more physical activity by taking the stairs instead of the elevator or parking farther away from stores. Include muscle strengthening activities 2 days each week. Regular exercise provides many health benefits. It helps you manage your weight, and decreases your risk for type 2 diabetes, heart disease, stroke, and high blood pressure. Exercise can also help improve your mood. Ask your healthcare provider about the best exercise plan for you.       General health and safety guidelines:   Do not smoke.  Nicotine and other chemicals in cigarettes and cigars can cause lung damage. Ask your healthcare provider for information if you currently smoke and need help to quit. E-cigarettes or smokeless tobacco still contain nicotine. Talk to your healthcare provider before you use these products.    Limit alcohol.  A drink of alcohol is 12 ounces of beer, 5 ounces of wine, or 1½ ounces of liquor.    Lose weight, if needed.  Being overweight increases your risk of certain health conditions. These include heart disease, high blood pressure, type 2 diabetes, and certain types of  cancer.    Protect your skin.  Do not sunbathe or use tanning beds. Use sunscreen with a SPF 15 or higher. Apply sunscreen at least 15 minutes before you go outside. Reapply sunscreen every 2 hours. Wear protective clothing, hats, and sunglasses when you are outside.    Drive safely.  Always wear your seatbelt. Make sure everyone in your car wears a seatbelt. A seatbelt can save your life if you are in an accident. Do not use your cell phone when you are driving. This could distract you and cause an accident. Pull over if you need to make a call or send a text message.    Practice safe sex.  Use latex condoms if are sexually active and have more than one partner. Your healthcare provider may recommend screening tests for sexually transmitted infections (STIs).    Wear helmets, lifejackets, and protective gear.  Always wear a helmet when you ride a bike or motorcycle, go skiing, or play sports that could cause a head injury. Wear protective equipment when you play sports. Wear a lifejacket when you are on a boat or doing water sports.    © Copyright [employer] 2023 Information is for End User's use only and may not be sold, redistributed or otherwise used for commercial purposes.  The above information is an  only. It is not intended as medical advice for individual conditions or treatments. Talk to your doctor, nurse or pharmacist before following any medical regimen to see if it is safe and effective for you.    Obesity   AMBULATORY CARE:   Obesity  means your body mass index (BMI) is greater than 30. Your healthcare provider will use your age, height, and weight to measure your BMI.  The risks of obesity include  many health problems, including injuries or physical disability.  Diabetes (high blood sugar level)    High blood pressure or high cholesterol    Heart disease or heart failure    Stroke    Gallbladder or liver disease    Cancer of the colon, breast, prostate, liver, or kidney    Sleep  apnea    Arthritis or gout    Screening  is done to check for health conditions before you have signs or symptoms. If you are 35 to 70 years old, your blood sugar level may be checked every 3 years for signs of prediabetes or diabetes. Your healthcare provider will check your blood pressure at each visit. High blood pressure can lead to a stroke or other problems. Your provider may check for signs of heart disease, cancer, or other health problems.  Seek care immediately if:   You have a severe headache, confusion, or difficulty speaking.    You have weakness on one side of your body.    You have chest pain, sweating, or shortness of breath.    Call your doctor if:   You have symptoms of gallbladder or liver disease, such as pain in your upper abdomen.    You have knee or hip pain and discomfort while walking.    You have symptoms of diabetes, such as intense hunger and thirst, and frequent urination.    You have symptoms of sleep apnea, such as snoring or daytime sleepiness.    You have questions or concerns about your condition or care.    Treatment for obesity  focuses on helping you lose weight to improve your health. Even a small decrease in BMI can reduce the risk for many health problems. Your healthcare provider will help you set a weight-loss goal.  Lifestyle changes  are the first step in treating obesity. These include making healthy food choices and getting regular physical activity. Your healthcare provider may suggest a weight-loss program that involves coaching, education, and therapy.    Medicine  may help you lose weight when it is used with a healthy foods and physical activity.    Surgery  can help you lose weight if you have obesity along with other health problems. Several types of weight-loss surgery are available. Ask your healthcare provider for more information.    Tips for safe weight loss:   Set small, realistic goals.  An example of a small goal is to walk for 20 minutes 5 days a week.  Anther goal is to lose 5% of your body weight.    Ask for support.  Tell friends, family members, and coworkers about your goals. Ask someone to lose weight with you. You may also want to join a weight-loss support group.    Identify foods or triggers that may cause you to overeat.  Remove tempting high-calorie foods from your home and workplace. Place a bowl of fresh fruit on your kitchen counter. If stress causes you to eat, find other ways to cope with stress. A counselor or therapist may be able to help you.    Track your daily calories and activity.  Write down what you eat and drink. Also write down how many minutes of physical activity you do each day.     Track your weekly weight.  Weigh yourself in the morning, before you eat or drink anything but after you use the bathroom. Use the same scale, in the same place, and in similar clothing each time. Only weigh yourself 1 to 2 times each week, or as directed. You may become discouraged if you weigh yourself every day.    Eating changes:  You will need to eat 500 to 1,000 fewer calories each day than you currently eat to lose 1 to 2 pounds a week. The following changes will help you cut calories:  Eat smaller portions.  Use small plates, no larger than 9 inches in diameter. Fill your plate half full of fruits and vegetables. Measure your food using measuring cups until you know what a serving size looks like.         Eat 3 meals and 1 or 2 snacks each day.  Plan your meals in advance. Cook and eat at home most of the time. Eat slowly. Do not skip meals. Skipping meals can lead to overeating later in the day. This can make it harder for you to lose weight. Talk with a dietitian to help you make a meal plan and schedule that is right for you.    Eat fruits and vegetables at every meal.  They are low in calories and high in fiber, which makes you feel full. Do not add butter, margarine, or cream sauce to vegetables. Use herbs to season steamed vegetables.    Eat  less fat and fewer fried foods.  Eat more baked or grilled chicken and fish. These protein sources are lower in calories and fat than red meat. Limit fast food. Dress your salads with olive oil and vinegar instead of bottled dressing.    Limit the amount of sugar you eat.  Do not drink sugary beverages. Limit alcohol.       Activity changes:  Physical activity is good for your body in many ways. It helps you burn calories and build strong muscles. It decreases stress and depression, and improves your mood. It can also help you sleep better. Talk to your healthcare provider before you begin an exercise program.  Exercise for at least 30 minutes 5 days a week.  Start slowly. Set aside time each day for physical activity that you enjoy and that is convenient for you. It is best to do both weight training and an activity that increases your heart rate, such as walking, bicycling, or swimming.            Find ways to be more active.  Do yard work and housecleaning. Walk up the stairs instead of using elevators. Spend your leisure time going to events that require walking, such as outdoor festivals or fairs. This extra physical activity can help you lose weight and keep it off.       Follow up with your doctor as directed:  You may need to meet with a dietitian. Write down your questions so you remember to ask them during your visits.  © Copyright [employer] 2023 Information is for End User's use only and may not be sold, redistributed or otherwise used for commercial purposes.  The above information is an  only. It is not intended as medical advice for individual conditions or treatments. Talk to your doctor, nurse or pharmacist before following any medical regimen to see if it is safe and effective for you.    Cholesterol and Your Health   AMBULATORY CARE:   Cholesterol  is a waxy, fat-like substance. Your body uses cholesterol to make hormones and new cells, and to protect nerves. Cholesterol is made by  your body. It also comes from certain foods you eat, such as meat and dairy products. Your healthcare provider can help you set goals for your cholesterol levels. Your provider can help you create a plan to meet your goals.  Cholesterol level goals:  Your cholesterol level goals depend on your risk for heart disease, your age, and your other health conditions. The following are general guidelines:  Total cholesterol  includes low-density lipoprotein (LDL), high-density lipoprotein (HDL), and triglyceride levels. The total cholesterol level should be lower than 200 mg/dL and is best at about 150 mg/dL.    LDL cholesterol  is called bad cholesterol  because it forms plaque in your arteries. As plaque builds up, your arteries become narrow, and less blood flows through. When plaque decreases blood flow to your heart, you may have chest pain. If plaque completely blocks an artery that brings blood to your heart, you may have a heart attack. Plaque can break off and form blood clots. Blood clots may block arteries in your brain and cause a stroke. The level should be less than 130 mg/dL and is best at about 100 mg/dL.         HDL cholesterol  is called good cholesterol  because it helps remove LDL cholesterol from your arteries. It does this by attaching to LDL cholesterol and carrying it to your liver. Your liver breaks down LDL cholesterol so your body can get rid of it. High levels of HDL cholesterol can help prevent a heart attack and stroke. Low levels of HDL cholesterol can increase your risk for heart disease, heart attack, and stroke. The level should be at least 40 mg/dL in males or at least 50 mg/dL in females.    Triglycerides  are a type of fat that store energy from foods you eat. High levels of triglycerides also cause plaque buildup. This can increase your risk for a heart attack or stroke. If your triglyceride level is high, your LDL cholesterol level may also be high. The level should be less than 150  mg/dL.    Any of the following can increase your risk for high cholesterol:   Smoking or drinking large amounts of alcohol    Having overweight or obesity, or not getting enough exercise    A medical condition such as hypertension (high blood pressure) or diabetes    A family history of high cholesterol    Age older than 65    What you need to know about having your cholesterol levels checked:  Adults 20 to 45 years of age should have their cholesterol levels checked every 4 to 6 years. Adults 45 years or older should have their cholesterol checked every 1 to 2 years. You may need your cholesterol checked more often, or at a younger age, if you have risk factors for heart disease. You may also need to have your cholesterol checked more often if you have other health conditions, such as diabetes. Blood tests are used to check cholesterol levels. Blood tests measure your levels of triglycerides, LDL cholesterol, and HDL cholesterol.  How healthy fats affect your cholesterol levels:  Healthy fats, also called unsaturated fats, help lower LDL cholesterol and triglyceride levels. Healthy fats include the following:  Monounsaturated fats  are found in foods such as olive oil, canola oil, avocado, nuts, and olives.    Polyunsaturated fats,  such as omega 3 fats, are found in fish, such as salmon, trout, and tuna. They can also be found in plant foods such as flaxseed, walnuts, and soybeans.    How unhealthy fats affect your cholesterol levels:  Unhealthy fats increase LDL cholesterol and triglyceride levels. They are found in foods high in cholesterol, saturated fat, and trans fat:  Cholesterol  is found in eggs, dairy, and meat.    Saturated fat  is found in butter, cheese, ice cream, whole milk, and coconut oil. Saturated fat is also found in meat, such as sausage, hot dogs, and bologna.    Trans fat  is found in liquid oils and is used in fried and baked foods. Foods that contain trans fats include chips, crackers,  muffins, sweet rolls, microwave popcorn, and cookies.    Treatment  for high cholesterol will also decrease your risk of heart disease, heart attack, and stroke. Treatment may include any of the following:  Lifestyle changes  may include food, exercise, weight loss, and quitting smoking. You may also need to decrease the amount of alcohol you drink. Your healthcare provider will want you to start with lifestyle changes. Other treatment may be added if lifestyle changes are not enough. Your healthcare provider may recommend you work with a team to manage hyperlipidemia. The team may include medical experts such as a dietitian, an exercise or physical therapist, and a behavior therapist. Your family members may be included in helping you create lifestyle changes.    Medicines  may be given to lower your LDL cholesterol, triglyceride levels, or total cholesterol level. You may need medicines to lower your cholesterol if any of the following is true:    You have a history of stroke, TIA, unstable angina, or a heart attack.    Your LDL cholesterol level is 190 mg/dL or higher.    You are age 40 to 75 years, have diabetes or heart disease risk factors, and your LDL cholesterol is 70 mg/dL or higher.    Supplements  include fish oil, red yeast rice, and garlic. Fish oil may help lower your triglyceride and LDL cholesterol levels. It may also increase your HDL cholesterol level. Red yeast rice may help decrease your total cholesterol level and LDL cholesterol level. Garlic may help lower your total cholesterol level. Do not take any supplements without talking to your healthcare provider.    Food changes you can make to lower your cholesterol levels:  A dietitian can help you create a healthy eating plan. Your dietitian can show you how to read food labels and choose foods low in saturated fat, trans fats, and cholesterol.     Decrease the total amount of fat you eat.  Choose lean meats, fat-free or 1% fat milk, and low-fat  dairy products, such as yogurt and cheese. Try to limit or avoid red meats. Limit or do not eat fried foods or baked goods, such as cookies.    Replace unhealthy fats with healthy fats.  Cook foods in olive oil or canola oil. Choose soft margarines that are low in saturated fat and trans fat. Seeds, nuts, and avocados are other examples of healthy fats.    Eat foods with omega-3 fats.  Examples include salmon, tuna, mackerel, walnuts, and flaxseed. Eat fish 2 times per week. Pregnant women should not eat fish that have high levels of mercury, such as shark, swordfish, and cristhian mackerel.         Increase the amount of high-fiber foods you eat.  High-fiber foods can help lower your LDL cholesterol. Aim to get between 20 and 30 grams of fiber each day. Fruits and vegetables are high in fiber. Eat at least 5 servings each day. Other high-fiber foods are whole-grain or whole-wheat breads, pastas, or cereals, and brown rice. Eat 3 ounces of whole-grain foods each day. Increase fiber slowly. You may have abdominal discomfort, bloating, and gas if you add fiber to your diet too quickly.         Eat healthy protein foods.  Examples include low-fat dairy products, skinless chicken and turkey, fish, and nuts.    Limit foods and drinks that are high in sugar.  Your dietitian or healthcare provider can help you create daily limits for high-sugar foods and drinks. The limit may be lower if you have diabetes or another health condition. Limits can also help you lose weight if needed.  Lifestyle changes you can make to lower your cholesterol levels:   Maintain a healthy weight.  Ask your healthcare provider what a healthy weight is for you. Ask your provider to help you create a weight loss plan if needed. Weight loss can decrease your total cholesterol and triglyceride levels. Weight loss may also help keep your blood pressure at a healthy level.    Be physically active throughout the day.  Physical activity, such as exercise, can  help lower your total cholesterol level and maintain a healthy weight. Physical activity can also help increase your HDL cholesterol level. Work with your healthcare provider to create an program that is right for you. Get at least 30 to 40 minutes of moderate physical activity most days of the week. Examples of exercise include brisk walking, swimming, or biking. Also include strength training at least 2 times each week. Your healthcare providers can help you create a physical activity plan.            Do not smoke.  Nicotine and other chemicals in cigarettes and cigars can raise your cholesterol levels. Ask your healthcare provider for information if you currently smoke and need help to quit. E-cigarettes or smokeless tobacco still contain nicotine. Talk to your healthcare provider before you use these products.         Limit or do not drink alcohol.  Alcohol can increase your triglyceride levels. Ask your healthcare provider before you drink alcohol. Ask how much is okay for you to drink in 24 hours or 1 week.    Follow up with your doctor as directed:  Write down your questions so you remember to ask them during your visits.  © Copyright [employer] 2023 Information is for End User's use only and may not be sold, redistributed or otherwise used for commercial purposes.  The above information is an  only. It is not intended as medical advice for individual conditions or treatments. Talk to your doctor, nurse or pharmacist before following any medical regimen to see if it is safe and effective for you.

## 2024-03-22 NOTE — PROGRESS NOTES
Assessment/Plan:  Problem List Items Addressed This Visit          Cardiovascular and Mediastinum    Migraine without status migrainosus, not intractable     Stable s Rx.      For headache and migraine prevention I would suggest a combination vitamin supplement which may include 1 or more of the following ingredients:  Magnesium 400 mg , Riboflavin (vitamin B2) 400 - 600 mg,  Butterbur 150 mg (PA free). Other ingredients that may be helpful are feverfew, coenzyme Q10 100 mg three times a day,  melatonin and pamela.  Some example brands that combine some of these ingredients are Migravent or Dolovent.          Relevant Medications    Vortioxetine HBr (Trintellix) 20 MG tablet    Other Relevant Orders    Magnesium       Digestive    GERD (gastroesophageal reflux disease)     New.  Start Omeprazole 20mg BID.  Pending GI consult as likely needs EGD.  Pending labs.  Watch GERD diet.  Handout given.           Relevant Medications    omeprazole (PriLOSEC) 20 mg delayed release capsule    Other Relevant Orders    Ambulatory Referral to Gastroenterology    H. pylori antigen, stool    Magnesium       Urinary    Bladder spasm     Management per Uro.  F/U PRN.  Stable s Ditropan 5mg BID.              Behavioral Health    Anxiety     Uncontrolled.  Start Trintellix 20mg QD.  Previously improved on Buspar 15mg BID.  Counseling advised.           Relevant Orders    CBC and differential    Comprehensive metabolic panel    TSH, 3rd generation with Free T4 reflex    Episode of recurrent major depressive disorder (HCC)     Uncontrolled.  Start Trintellix 20mg QD.  Previously improved on Buspar 15mg BID.  Counseling advised.  To consider Genesight testing PRN?           Relevant Medications    Vortioxetine HBr (Trintellix) 20 MG tablet    Other Relevant Orders    CBC and differential    Comprehensive metabolic panel    TSH, 3rd generation with Free T4 reflex       Neurology/Sleep    Other insomnia     Unstable due to GERD.  Previously  stable on Trazodone 100mg QHS. Counseling advised.              Other    Morbid obesity (HCC)     Recommend lifestyle modifications.  To consider GLP1-A in future PRN?           Relevant Orders    TSH, 3rd generation with Free T4 reflex    Vitamin D 25 hydroxy    Vitamin D deficiency     Pending labs.  Recommend start multivitamin and over-the-counter vitamin D3 1000 - 3000 International Units daily.           Relevant Orders    Comprehensive metabolic panel    Vitamin D 25 hydroxy     Other Visit Diagnoses       Annual physical exam    -  Primary    Health Maintenance   Topic Date Due   • Hepatitis C Screening  Never done   • Depression Follow-up Plan  Never done   • COVID-19 Vaccine (4 - 2023-24 season) 06/22/2024 (Originally 9/1/2023)   • Influenza Vaccine (1) 06/30/2024 (Originally 9/1/2023)   • Depression Screening  03/22/2025   • Annual Physical  03/22/2025   • DTaP,Tdap,and Td Vaccines (3 - Td or Tdap) 03/22/2034   • Zoster Vaccine (1 of 2) 11/14/2042   • HIV Screening  Completed   • Pneumococcal Vaccine: Pediatrics (0 to 5 Years) and At-Risk Patients (6 to 64 Years)  Aged Out   • HIB Vaccine  Aged Out   • IPV Vaccine  Aged Out   • Hepatitis A Vaccine  Aged Out   • Meningococcal ACWY Vaccine  Aged Out   • HPV Vaccine  Aged Out         Morbid obesity with BMI of 40.0-44.9, adult (HCC)        Acute bilateral low back pain without sciatica        Relevant Orders    Ambulatory Referral to [facility]    Likely due to Quadratus Lumborum strain.  Advise Tylenol PRN.  Home Exercise Program given.      Encounter for immunization        Relevant Orders    TDAP VACCINE GREATER THAN OR EQUAL TO 6YO IM (Completed)    Screening for cardiovascular condition        Relevant Orders    CBC and differential    Comprehensive metabolic panel    Lipid panel    LDL cholesterol, direct    Need for hepatitis C screening test        Relevant Orders    Hepatitis C antibody               Return in about 6 weeks (around  5/3/2024) for F/U Anx/Dep, GERD, Vit D Def, Labs.      Future Appointments   Date Time Provider Department Center   5/3/2024 10:00 AM Eloise Hurley DO [facility]          Subjective:     Cheryl is a 33 y.o. female who presents today for a follow-up on her chronic medical conditions.        HPI:  Chief Complaint   Patient presents with   • Depression     Was previously on mood meds and would like to restart at this time. Has not been here for two years.    • Back Pain     L low back pain for the last week. Intermittent pain, not associated with any specific movements or positions. Not using any home tx. Tylenol used prn. Denies numbness/tingling in the L leg.    • GERD     GERD sx have been worsening over the last few months. Is having problems with all foods and drinks. Reports nights where it wakes her up or keeps her up. Has not tried any otc meds. Ref to GI pending.    •      Pt agreeable to Tdap today, pending. No flu shot this year. No additional covid boosters.      -- Above per clinical staff and reviewed. --      HPI      Today:    Return in about 6 weeks (around 12/10/2021) for Physical / 4mo- Anx/Dep, Insomnia, Migraine, Vit D Def, Obesity, Labs     4mo OV - Overdue    B/L Lower Lumbar  / Quadratus Lumborum Pain - L > R.  Symptoms x 1 week.  Has annoying pain all day.  No aggravating factors.  Stretching unhelpful.  Using Tylenol 500mg 2 pills QD PRN c benefit.  Denies Trauma, loss of bowel / bladder function, LE numbness.       Morbid Obesity - Watching diet. - eating smaller portions, less junk food, no longer drinking soda since late 2/21, drinking more water, less salt.  +Exercise - Walking 20 minutes, 4 days per week.       GERD - Symptoms x months.  No taking OTC meds.  Watching GERD diet, but has symptoms with drinking water.  GERD sometimes causes insomnia.  No GI consult or EGD previously.           Anxiety / Depression - Symptoms worse x 2 months, has work stress.  She never  started Viibryd 20mg QD as she was not focused on her health.  Previously on increased Buspar 15mg BID.  Feels anxiety is stable.  Denies trigger.  No longer has anxiety attacks daily at home, while driving, at work.  Anxiety attacks last 30 seconds - 5 minutes. D/C Effexor XR 225mg QD due to worsening depression.   Last episode of unprovoked crying 10/28/21, occurring more often.    D/C Cymbalta 60mg QD as anxiety improved, but depression is worsened.  D/C Lexapro 20mg QD due to worsening anxiety.  D/C Zoloft 50mg QD x 3 weeks due to worsening anxiety.  Was seeing counselor monthly prior COVID-19 - last appt 2/20, which was helpful, but counselor relocated.  She was on a waiting list at 2 counseling offices as of 5/26/21, but both were not a good fit.  Her work schedule has been busy and she has not been able to reconnect c counseling as of 10/29/21.  She is again quick to anger, easily annoyed, has more unprovoked crying.  Decreased motivation.  She has a good relationship c her ex-.  Breathing exercises have been helpful.  Symptoms since 2013 since birth of twin sons. No other mood meds.  Good social supports.  No SI/HI/AH/VH.      Insomnia - Stable when GERD is controlled.  Previously on Trazodone 100mg QHS PRN - taking 1 pill QHS.  She can fall sleep after 45-60 (previosly 10-15) minutes, and can sleep without interruption for 8 hours when she takes Rx.  When she doesn't take Rx, it takes 45-60 minutes to fall asleep, awakens q2-3 hours, awake for 1-15 minutes, then can fall asleep again.  Unsure of trigger.  Previously, took 2-3 hours to fall asleep, sleeps for 2-3 hours, then awakens.  No longer drinks 8 oz caffeine 4 times weekly.  Denies snoring or witnessed apnea.  Melantonin 10mg QHS x 3 weeks was unhelpful for falling asleep and staying asleep.            PHQ-2/9 Depression Screening    Little interest or pleasure in doing things: 2 - more than half the days  Feeling down, depressed, or hopeless:  1 - several days  Trouble falling or staying asleep, or sleeping too much: 2 - more than half the days  Feeling tired or having little energy: 2 - more than half the days  Poor appetite or overeatin - more than half the days  Feeling bad about yourself - or that you are a failure or have let yourself or your family down: 2 - more than half the days  Trouble concentrating on things, such as reading the newspaper or watching television: 2 - more than half the days  Moving or speaking so slowly that other people could have noticed. Or the opposite - being so fidgety or restless that you have been moving around a lot more than usual: 1 - several days  Thoughts that you would be better off dead, or of hurting yourself in some way: 0 - not at all  PHQ-9 Score: 14  PHQ-9 Interpretation: Moderate depression       BLADIMIR-7 Flowsheet Screening    Flowsheet Row Most Recent Value   Over the last 2 weeks, how often have you been bothered by any of the following problems?    Feeling nervous, anxious, or on edge 2   Not being able to stop or control worrying 1   Worrying too much about different things 1   Trouble relaxing 2   Being so restless that it is hard to sit still 1   Becoming easily annoyed or irritable 2   Feeling afraid as if something awful might happen 1   BLADIMIR-7 Total Score 10           Vitamin D Deficiency - s/p Drisdol.  Taking MVI and but not OTC Vitamin D 3,000IU daily.       Migraines - Last migraine .  Occurs less often - 1-2 times per month, once weekly when younger.  Has had migraines since 12yo.  Using Aleve / Ibuprofen / Tylenol PRN, rest, helpful.  B/L Ethmoid pain - feels like eye strain, can progress to achy, throbbing sensation.  Currently 0/10, Max 5-6/10.  Lasts 30-45 minutes to 2-3 hours.  Denies aura.  Sometimes has associated nausea.  Denies photophobia and phonophobia.  No migraine Rx previously.       Pelvic Pain / Bladder Spasms - Stable.  Occurs 3 times per month..  Management per Uro   "Trepasso.  Next appt PRN.  Previously on Ditropan 5mg BID, which patient does not want to resume.  S/p stable transvaginal U/S 6/25/20 and pelvic exam per Gyn Dr. Madrigal 6/19/20.  Not taking Vistaril 25mg QHS PRN for pelvic pain per Gyn.       H/O Smoker - Quit cold turkey 3/1/21. Previously Smoking 4 cigarettes daily.  Wants to weaning/ / quit cold turkey as she has done so previously.           Reviewed:  Labs 10/14/21,  Gyn 6/19/20     Sees Gyn [facility].  Next appt 6/20 - Overdue.      Sees Dentist q6 months.  Sees Optho q2 years.      The following portions of the patient's history were reviewed and updated as appropriate: allergies, current medications, past family history, past medical history, past social history, past surgical history and problem list.      Review of Systems   Constitutional:  Positive for appetite change (Intermittent). Negative for chills, diaphoresis, fatigue and fever.   Respiratory:  Negative for chest tightness and shortness of breath.    Cardiovascular:  Negative for chest pain.   Gastrointestinal:  Positive for nausea and vomiting. Negative for abdominal pain, blood in stool and diarrhea.   Genitourinary:  Negative for dysuria.        Current Outpatient Medications   Medication Sig Dispense Refill   • Multiple Vitamin (MULTIVITAMIN) tablet Take 1 tablet by mouth daily     • omeprazole (PriLOSEC) 20 mg delayed release capsule Take 1 capsule (20 mg total) by mouth 2 (two) times a day 60 capsule 1   • Vortioxetine HBr (Trintellix) 20 MG tablet 1/2 tab by mouth once daily x 1 week, then increase to 1 tab by mouth once daily thereafter. 30 tablet 1   • Cholecalciferol (VITAMIN D3) 3000 units TABS Take 1 tablet by mouth daily (Patient not taking: Reported on 3/22/2024)       No current facility-administered medications for this visit.       Objective:  /74   Pulse 96   Temp 97.7 °F (36.5 °C)   Resp 16   Ht 5' 1.5\" (1.562 m)   Wt 99 kg (218 lb 3.2 oz)   LMP " 01/01/2016 (Within Years)   SpO2 98%   BMI 40.56 kg/m²    Wt Readings from Last 3 Encounters:   03/22/24 99 kg (218 lb 3.2 oz)   10/29/21 88.5 kg (195 lb)   10/15/21 88.5 kg (195 lb)      BP Readings from Last 3 Encounters:   03/22/24 122/74   06/17/21 110/55   05/26/21 112/70          Physical Exam  Vitals and nursing note reviewed.   Constitutional:       Appearance: Normal appearance. She is well-developed. She is obese.   HENT:      Head: Normocephalic and atraumatic.      Right Ear: Tympanic membrane, ear canal and external ear normal.      Left Ear: Tympanic membrane, ear canal and external ear normal.      Nose: Nose normal.      Right Sinus: No maxillary sinus tenderness or frontal sinus tenderness.      Left Sinus: No maxillary sinus tenderness or frontal sinus tenderness.      Mouth/Throat:      Mouth: Mucous membranes are moist.      Pharynx: Oropharynx is clear. Uvula midline.      Tonsils: No tonsillar exudate.   Eyes:      Extraocular Movements: Extraocular movements intact.      Conjunctiva/sclera: Conjunctivae normal.      Pupils: Pupils are equal, round, and reactive to light.   Cardiovascular:      Rate and Rhythm: Normal rate and regular rhythm.      Pulses: Normal pulses.      Heart sounds: Normal heart sounds.   Pulmonary:      Effort: Pulmonary effort is normal.      Breath sounds: Normal breath sounds.   Abdominal:      General: Bowel sounds are normal. There is no distension.      Palpations: Abdomen is soft. There is no mass.      Tenderness: There is abdominal tenderness (Mild) in the right lower quadrant and left lower quadrant. There is no right CVA tenderness, left CVA tenderness, guarding or rebound.   Musculoskeletal:         General: No swelling or tenderness.      Cervical back: Neck supple.      Right lower leg: No edema.      Left lower leg: No edema.      Comments: Negative SLR B/L.  Back c full AROM.     Lymphadenopathy:      Cervical: No cervical adenopathy.   Skin:      "Findings: No rash.   Neurological:      General: No focal deficit present.      Mental Status: She is alert and oriented to person, place, and time.      Cranial Nerves: No cranial nerve deficit.      Sensory: No sensory deficit.      Motor: No weakness.      Coordination: Coordination normal.      Deep Tendon Reflexes: Reflexes normal.   Psychiatric:         Mood and Affect: Mood normal.         Behavior: Behavior normal.         Thought Content: Thought content normal.         Judgment: Judgment normal.         Lab Results:      Lab Results   Component Value Date    WBC 9.85 10/14/2021    HGB 12.6 10/14/2021    HCT 41.2 10/14/2021     10/14/2021    TRIG 175 (H) 10/14/2021    HDL 43 10/14/2021    LDLDIRECT 95 10/14/2021    ALT 26 10/14/2021    AST 13 10/14/2021    K 4.0 10/14/2021     10/14/2021    CREATININE 1.07 10/14/2021    BUN 15 10/14/2021    CO2 27 10/14/2021    GLUF 84 10/14/2021    HGBA1C 5.2 10/14/2021     No results found for: \"URICACID\"  Invalid input(s): \"BASENAME\" Vitamin D    No results found.     POCT Labs      BMI Counseling: Body mass index is 40.56 kg/m². The BMI is above normal. Exercise recommendations include exercising 3-5 times per week.     Depression Screening and Follow-up Plan: Patient's depression screening was positive with a PHQ-9 score of 14. Patient advised to follow-up with PCP for further management.                   BMI Counseling: Body mass index is 40.56 kg/m². The BMI is above normal. Nutrition recommendations include 3-5 servings of fruits/vegetables daily. Exercise recommendations include exercising 3-5 times per week.  "

## 2024-03-23 NOTE — ASSESSMENT & PLAN NOTE
Stable s Rx.      For headache and migraine prevention I would suggest a combination vitamin supplement which may include 1 or more of the following ingredients:  Magnesium 400 mg , Riboflavin (vitamin B2) 400 - 600 mg,  Butterbur 150 mg (PA free). Other ingredients that may be helpful are feverfew, coenzyme Q10 100 mg three times a day,  melatonin and pamela.  Some example brands that combine some of these ingredients are Migravent or Dolovent.

## 2024-03-23 NOTE — ASSESSMENT & PLAN NOTE
Pending labs.  Recommend start multivitamin and over-the-counter vitamin D3 1000 - 3000 International Units daily.

## 2024-03-23 NOTE — ASSESSMENT & PLAN NOTE
Uncontrolled.  Start Trintellix 20mg QD.  Previously improved on Buspar 15mg BID.  Counseling advised.  To consider Genesight testing PRN?

## 2024-03-23 NOTE — ASSESSMENT & PLAN NOTE
New.  Start Omeprazole 20mg BID.  Pending GI consult as likely needs EGD.  Pending labs.  Watch GERD diet.  Handout given.

## 2024-03-23 NOTE — ASSESSMENT & PLAN NOTE
Uncontrolled.  Start Trintellix 20mg QD.  Previously improved on Buspar 15mg BID.  Counseling advised.

## 2024-03-28 ENCOUNTER — TELEPHONE (OUTPATIENT)
Age: 34
End: 2024-03-28

## 2024-03-28 NOTE — TELEPHONE ENCOUNTER
----- Message from Cheryl Ayala sent at 3/27/2024  7:57 PM EDT -----  Regarding: Trintellix needs Pre-Authorization  Contact: [phone]  Hello. I went to  my prescription this evening but when they entered my insurance information it came back that it needs a pre-authorization. Please call me with any questions.

## 2024-04-02 NOTE — TELEPHONE ENCOUNTER
PA for Trintellix     Submitted via    [x]CMM-KEY [id number]  []Surescripts-Case ID #   []Faxed to plan   []Other website   []Phone call Case ID #     Office notes sent, clinical questions answered. Awaiting determination    Turnaround time for your insurance to make a decision on your Prior Authorization can take 7-21 business days.

## 2024-04-26 ENCOUNTER — RA CDI HCC (OUTPATIENT)
Dept: OTHER | Facility: HOSPITAL | Age: 34
End: 2024-04-26

## 2024-04-30 ENCOUNTER — LAB (OUTPATIENT)
Dept: LAB | Facility: CLINIC | Age: 34
End: 2024-04-30
Payer: COMMERCIAL

## 2024-04-30 DIAGNOSIS — Z13.6 SCREENING FOR CARDIOVASCULAR CONDITION: ICD-10-CM

## 2024-04-30 DIAGNOSIS — K21.9 GASTROESOPHAGEAL REFLUX DISEASE, UNSPECIFIED WHETHER ESOPHAGITIS PRESENT: ICD-10-CM

## 2024-04-30 DIAGNOSIS — E55.9 VITAMIN D DEFICIENCY: ICD-10-CM

## 2024-04-30 DIAGNOSIS — F33.1 MODERATE EPISODE OF RECURRENT MAJOR DEPRESSIVE DISORDER (HCC): ICD-10-CM

## 2024-04-30 DIAGNOSIS — G43.909 MIGRAINE WITHOUT STATUS MIGRAINOSUS, NOT INTRACTABLE, UNSPECIFIED MIGRAINE TYPE: ICD-10-CM

## 2024-04-30 DIAGNOSIS — E66.01 MORBID OBESITY (HCC): ICD-10-CM

## 2024-04-30 DIAGNOSIS — Z11.59 NEED FOR HEPATITIS C SCREENING TEST: ICD-10-CM

## 2024-04-30 DIAGNOSIS — F41.9 ANXIETY: ICD-10-CM

## 2024-04-30 PROBLEM — E78.5 HYPERLIPIDEMIA: Status: ACTIVE | Noted: 2024-04-30

## 2024-04-30 LAB
25(OH)D3 SERPL-MCNC: 17.3 NG/ML (ref 30–100)
BASOPHILS # BLD AUTO: 0.09 THOUSANDS/ÂΜL (ref 0–0.1)
BASOPHILS NFR BLD AUTO: 1 % (ref 0–1)
CHOLEST SERPL-MCNC: 181 MG/DL
EOSINOPHIL # BLD AUTO: 0.24 THOUSAND/ÂΜL (ref 0–0.61)
EOSINOPHIL NFR BLD AUTO: 3 % (ref 0–6)
ERYTHROCYTE [DISTWIDTH] IN BLOOD BY AUTOMATED COUNT: 12.9 % (ref 11.6–15.1)
HCT VFR BLD AUTO: 40.2 % (ref 34.8–46.1)
HCV AB SER QL: NORMAL
HDLC SERPL-MCNC: 46 MG/DL
HGB BLD-MCNC: 12.8 G/DL (ref 11.5–15.4)
IMM GRANULOCYTES # BLD AUTO: 0.03 THOUSAND/UL (ref 0–0.2)
IMM GRANULOCYTES NFR BLD AUTO: 0 % (ref 0–2)
LDLC SERPL CALC-MCNC: 104 MG/DL (ref 0–100)
LDLC SERPL DIRECT ASSAY-MCNC: 103 MG/DL (ref 0–100)
LYMPHOCYTES # BLD AUTO: 2.58 THOUSANDS/ÂΜL (ref 0.6–4.47)
LYMPHOCYTES NFR BLD AUTO: 28 % (ref 14–44)
MAGNESIUM SERPL-MCNC: 2.1 MG/DL (ref 1.9–2.7)
MCH RBC QN AUTO: 27.9 PG (ref 26.8–34.3)
MCHC RBC AUTO-ENTMCNC: 31.8 G/DL (ref 31.4–37.4)
MCV RBC AUTO: 88 FL (ref 82–98)
MONOCYTES # BLD AUTO: 0.7 THOUSAND/ÂΜL (ref 0.17–1.22)
MONOCYTES NFR BLD AUTO: 8 % (ref 4–12)
NEUTROPHILS # BLD AUTO: 5.59 THOUSANDS/ÂΜL (ref 1.85–7.62)
NEUTS SEG NFR BLD AUTO: 60 % (ref 43–75)
NONHDLC SERPL-MCNC: 135 MG/DL
NRBC BLD AUTO-RTO: 0 /100 WBCS
PLATELET # BLD AUTO: 330 THOUSANDS/UL (ref 149–390)
PMV BLD AUTO: 10.5 FL (ref 8.9–12.7)
RBC # BLD AUTO: 4.58 MILLION/UL (ref 3.81–5.12)
TRIGL SERPL-MCNC: 157 MG/DL
TSH SERPL DL<=0.05 MIU/L-ACNC: 1.46 UIU/ML (ref 0.45–4.5)
WBC # BLD AUTO: 9.23 THOUSAND/UL (ref 4.31–10.16)

## 2024-04-30 PROCEDURE — 84443 ASSAY THYROID STIM HORMONE: CPT

## 2024-04-30 PROCEDURE — 85025 COMPLETE CBC W/AUTO DIFF WBC: CPT

## 2024-04-30 PROCEDURE — 82306 VITAMIN D 25 HYDROXY: CPT

## 2024-04-30 PROCEDURE — 86803 HEPATITIS C AB TEST: CPT

## 2024-04-30 PROCEDURE — 83721 ASSAY OF BLOOD LIPOPROTEIN: CPT

## 2024-04-30 PROCEDURE — 80053 COMPREHEN METABOLIC PANEL: CPT

## 2024-04-30 PROCEDURE — 80061 LIPID PANEL: CPT

## 2024-04-30 PROCEDURE — 83735 ASSAY OF MAGNESIUM: CPT

## 2024-04-30 PROCEDURE — 36415 COLL VENOUS BLD VENIPUNCTURE: CPT

## 2024-04-30 NOTE — RESULT ENCOUNTER NOTE
Unstable labs - will review with patient at upcoming appointment.    Hyperlipidemia - New Dx.  Recommend lifestyle modifications.

## 2024-05-03 ENCOUNTER — OFFICE VISIT (OUTPATIENT)
Dept: FAMILY MEDICINE CLINIC | Facility: CLINIC | Age: 34
End: 2024-05-03
Payer: COMMERCIAL

## 2024-05-03 VITALS
HEART RATE: 98 BPM | DIASTOLIC BLOOD PRESSURE: 86 MMHG | OXYGEN SATURATION: 97 % | WEIGHT: 214 LBS | SYSTOLIC BLOOD PRESSURE: 126 MMHG | HEIGHT: 61 IN | RESPIRATION RATE: 16 BRPM | BODY MASS INDEX: 40.4 KG/M2 | TEMPERATURE: 97.6 F

## 2024-05-03 DIAGNOSIS — F33.1 MODERATE EPISODE OF RECURRENT MAJOR DEPRESSIVE DISORDER (HCC): ICD-10-CM

## 2024-05-03 DIAGNOSIS — F33.0 MILD EPISODE OF RECURRENT MAJOR DEPRESSIVE DISORDER (HCC): ICD-10-CM

## 2024-05-03 DIAGNOSIS — G47.09 OTHER INSOMNIA: ICD-10-CM

## 2024-05-03 DIAGNOSIS — E55.9 VITAMIN D DEFICIENCY: ICD-10-CM

## 2024-05-03 DIAGNOSIS — G43.909 MIGRAINE WITHOUT STATUS MIGRAINOSUS, NOT INTRACTABLE, UNSPECIFIED MIGRAINE TYPE: ICD-10-CM

## 2024-05-03 DIAGNOSIS — E66.01 MORBID OBESITY (HCC): ICD-10-CM

## 2024-05-03 DIAGNOSIS — F41.9 ANXIETY: ICD-10-CM

## 2024-05-03 DIAGNOSIS — K21.9 GASTROESOPHAGEAL REFLUX DISEASE, UNSPECIFIED WHETHER ESOPHAGITIS PRESENT: ICD-10-CM

## 2024-05-03 DIAGNOSIS — E78.5 HYPERLIPIDEMIA, UNSPECIFIED HYPERLIPIDEMIA TYPE: Primary | ICD-10-CM

## 2024-05-03 PROCEDURE — 99214 OFFICE O/P EST MOD 30 MIN: CPT | Performed by: FAMILY MEDICINE

## 2024-05-03 PROCEDURE — 3725F SCREEN DEPRESSION PERFORMED: CPT | Performed by: FAMILY MEDICINE

## 2024-05-03 RX ORDER — ERGOCALCIFEROL 1.25 MG/1
50000 CAPSULE ORAL WEEKLY
Qty: 12 CAPSULE | Refills: 0 | Status: SHIPPED | OUTPATIENT
Start: 2024-05-03 | End: 2024-07-20

## 2024-05-03 RX ORDER — OMEPRAZOLE 20 MG/1
20 CAPSULE, DELAYED RELEASE ORAL 2 TIMES DAILY
Qty: 60 CAPSULE | Refills: 1 | Status: SHIPPED | OUTPATIENT
Start: 2024-05-03

## 2024-05-03 RX ORDER — BUSPIRONE HYDROCHLORIDE 7.5 MG/1
TABLET ORAL
Qty: 90 TABLET | Refills: 1 | Status: SHIPPED | OUTPATIENT
Start: 2024-05-03

## 2024-05-03 NOTE — ASSESSMENT & PLAN NOTE
Recurrent.      Vitamin D Deficiency - Recommend start multivitamin and prescription vitamin D (Drisdol).  When 12 weeks of Drisdol completed, continue multivitamin and start over-the-counter Vitamin D3 1,000-3,000 International Units daily.  Check vitamin D level 1 week after completing Drisdol.

## 2024-05-03 NOTE — ASSESSMENT & PLAN NOTE
Unstable due to weather / temperature.  Previously stable on Trazodone 100mg QHS.  Counseling advised.

## 2024-05-03 NOTE — PATIENT INSTRUCTIONS
Vitamin D Deficiency - Recommend start multivitamin and prescription vitamin D (Drisdol).  When 12 weeks of Drisdol completed, continue multivitamin and start over-the-counter Vitamin D3 4,000 International Units daily.  Check vitamin D level 1 week after completing Drisdol.

## 2024-05-03 NOTE — ASSESSMENT & PLAN NOTE
Improved.  Continue Omeprazole 20mg BID.  Pending GI consult as likely needs EGD.  Watch GERD diet.

## 2024-05-03 NOTE — PROGRESS NOTES
Assessment/Plan:  Problem List Items Addressed This Visit        Cardiovascular and Mediastinum    Migraine without status migrainosus, not intractable     Stable s Rx.      For headache and migraine prevention I would suggest a combination vitamin supplement which may include 1 or more of the following ingredients:  Magnesium 400 mg , Riboflavin (vitamin B2) 400 - 600 mg,  Butterbur 150 mg (PA free). Other ingredients that may be helpful are feverfew, coenzyme Q10 100 mg three times a day,  melatonin and pamela.  Some example brands that combine some of these ingredients are Migravent or Dolovent.          Relevant Medications    Vortioxetine HBr (Trintellix) 20 MG tablet    Other Relevant Orders    Magnesium       Digestive    GERD (gastroesophageal reflux disease)     Improved.  Continue Omeprazole 20mg BID.  Pending GI consult as likely needs EGD.  Watch GERD diet.           Relevant Medications    omeprazole (PriLOSEC) 20 mg delayed release capsule    Other Relevant Orders    Magnesium       Behavioral Health    Anxiety     Uncontrolled.  Start Buspar 7.5mg TID PRN.  Continue Trintellix 20mg QD.   Counseling advised.           Relevant Medications    busPIRone (BUSPAR) 7.5 mg tablet    Other Relevant Orders    Comprehensive metabolic panel    Magnesium    TSH, 3rd generation with Free T4 reflex    Episode of recurrent major depressive disorder (HCC)     Improved.  Start Buspar 7.5mg TID PRN.  Continue Trintellix 20mg QD.   Counseling advised.           Relevant Medications    busPIRone (BUSPAR) 7.5 mg tablet    Vortioxetine HBr (Trintellix) 20 MG tablet    Other Relevant Orders    Comprehensive metabolic panel    Magnesium    TSH, 3rd generation with Free T4 reflex       Neurology/Sleep    Other insomnia     Unstable due to weather / temperature.  Previously stable on Trazodone 100mg QHS.  Counseling advised.           Relevant Orders    TSH, 3rd generation with Free T4 reflex       Other    Morbid obesity  (HCC)     Improved.  Recommend lifestyle modifications.  To consider GLP1-A in future PRN?           Relevant Orders    TSH, 3rd generation with Free T4 reflex    Vitamin D deficiency     Recurrent.      Vitamin D Deficiency - Recommend start multivitamin and prescription vitamin D (Drisdol).  When 12 weeks of Drisdol completed, continue multivitamin and start over-the-counter Vitamin D3 1,000-3,000 International Units daily.  Check vitamin D level 1 week after completing Drisdol.             Relevant Medications    ergocalciferol (ERGOCALCIFEROL) 1.25 MG (05609 UT) capsule    Other Relevant Orders    Vitamin D 25 hydroxy    Comprehensive metabolic panel    Hyperlipidemia - Primary     New.  Stable s statin.  Recommend lifestyle modifications.         Relevant Orders    Comprehensive metabolic panel    Lipid panel    TSH, 3rd generation with Free T4 reflex    LDL cholesterol, direct          Return in 6 weeks (on 6/14/2024) for F/U HL, Anx/Dep, GERD, Vit D Def, Labs; 9/3/24 4mo -HL, Anx/Dep, GERD, Vit D Def, Labs.      Future Appointments   Date Time Provider Department Center   6/17/2024  3:20 PM Eloise Hurley DO [facility]          Subjective:     Cheryl is a 33 y.o. female who presents today for a follow-up on her chronic medical conditions.        HPI:  Chief Complaint   Patient presents with   • Follow-up     F/U Anx/Dep, GERD, Vit D Def, Labs. Pt states her anxiety has been worse lately. Feels very antsy all the time.      -- Above per clinical staff and reviewed. --      HPI      Today:    Return in about 6 weeks (around 5/3/2024) for F/U Anx/Dep, GERD, Vit D Def, Labs.        B/L Lower Lumbar  / Quadratus Lumborum Pain - Improved c HEP.  Referred to [facility] PT 3/22/24, but did not attend.  L > R.  Symptoms x 1 week.  Has annoying pain all day.  No aggravating factors.  Stretching unhelpful.  Using Tylenol 500mg 2 pills QD PRN c benefit.  Denies Trauma, loss of bowel / bladder function, LE  numbness.       Morbid Obesity - Watching diet. - eating smaller portions, less junk food, no longer drinking soda since late 2/21, drinking more water, less salt.  +Exercise - Walking 20 minutes, 4 days per week.      Hyperlipidemia - No statin previously.        GERD - Referred to GI on 3/22/24, but did not schedule appt as she left multiple messages s return call.  On Omeprazole 20mg BID.  Symptoms x months.  No taking OTC meds.  Watching GERD diet, but has symptoms with drinking water.  GERD sometimes causes insomnia.  No GI consult or EGD previously.           Anxiety / Depression - On Trintellix 20mg QD x 6 weeks.  Mood is 50% improved, but feels more anxious.  Her legs will sometimes shake.  She no longer has unprovoked crying or mental meltdowns.  Denies trigger.  Symptoms worse x 2 months, has work stress.  She never started Viibryd 20mg QD as she was not focused on her health.  Previously on increased Buspar 15mg BID.  Feels anxiety is stable.  Denies trigger.  No longer has anxiety attacks daily at home, while driving, at work.  Anxiety attacks last 30 seconds - 5 minutes. D/C Effexor XR 225mg QD due to worsening depression.   Last episode of unprovoked crying 10/28/21, occurring more often.      D/C Cymbalta 60mg QD as anxiety improved, but depression is worsened.  D/C Lexapro 20mg QD due to worsening anxiety.  D/C Zoloft 50mg QD x 3 weeks due to worsening anxiety.  Was seeing counselor monthly prior COVID-19 - last appt 2/20, which was helpful, but counselor relocated.  She was on a waiting list at 2 counseling offices as of 5/26/21, but both were not a good fit.  Her work schedule has been busy and she has not been able to reconnect c counseling as of 10/29/21.   She has a good relationship c her ex-.  Breathing exercises have been helpful.  Symptoms since 2013 since birth of twin sons. No other mood meds.  Good social supports.  No SI/HI/AH/VH.      Insomnia - On Trintellix 20mg QD x 6 weeks -  unchanged.  She thinks weather / temperature changes are contributory.  Stable when GERD is controlled.  Previously on Trazodone 100mg QHS PRN - taking 1 pill QHS, she does not recall if helpful, but made her tired in AM.  She can fall sleep after 60 (previously 10-15) minutes, and can sleep without interruption for 8 hours when she takes Rx.  When she doesn't take Rx, it takes 45-60 minutes to fall asleep, awakens q2-3 hours, awake for 1-15 minutes, then can fall asleep again.  Unsure of trigger.  Previously, took 2-3 hours to fall asleep, sleeps for 2-3 hours, then awakens.  No longer drinks 8 oz caffeine 4 times weekly.  Denies snoring or witnessed apnea.  Melantonin 10mg QHS x 3 weeks was unhelpful for falling asleep and staying asleep.               PHQ-2/9 Depression Screening    Little interest or pleasure in doing things: 1 - several days  Feeling down, depressed, or hopeless: 1 - several days  Trouble falling or staying asleep, or sleeping too much: 2 - more than half the days  Feeling tired or having little energy: 1 - several days  Poor appetite or overeatin - several days  Feeling bad about yourself - or that you are a failure or have let yourself or your family down: 0 - not at all  Trouble concentrating on things, such as reading the newspaper or watching television: 1 - several days  Moving or speaking so slowly that other people could have noticed. Or the opposite - being so fidgety or restless that you have been moving around a lot more than usual: 2 - more than half the days  Thoughts that you would be better off dead, or of hurting yourself in some way: 0 - not at all  PHQ-9 Score: 9  PHQ-9 Interpretation: Mild depression       BLADIMIR-7 Flowsheet Screening    Flowsheet Row Most Recent Value   Over the last 2 weeks, how often have you been bothered by any of the following problems?    Feeling nervous, anxious, or on edge 2   Not being able to stop or control worrying 1   Worrying too much about  different things 1   Trouble relaxing 2   Being so restless that it is hard to sit still 2   Becoming easily annoyed or irritable 1   Feeling afraid as if something awful might happen 0   BLADIMIR-7 Total Score 9             Vitamin D Deficiency - s/p Drisdol.  Taking MVI and but not OTC Vitamin D 3,000IU daily.       Migraines - Last migraine 12/20.  Occurs less often - 1-2 times per month, once weekly when younger.  Has had migraines since 14yo.  Using Aleve / Ibuprofen / Tylenol PRN, rest, helpful.  B/L Ethmoid pain - feels like eye strain, can progress to achy, throbbing sensation.  Currently 0/10, Max 5-6/10.  Lasts 30-45 minutes to 2-3 hours.  Denies aura.  Sometimes has associated nausea.  Denies photophobia and phonophobia.  No migraine Rx previously.        H/O Smoker - Quit cold turkey 3/1/21. Previously Smoking 4 cigarettes daily.  Wants to weaning/ / quit cold turkey as she has done so previously.                   From previous note:    Return in about 6 weeks (around 12/10/2021) for Physical / 4mo- Anx/Dep, Insomnia, Migraine, Vit D Def, Obesity, Labs      4mo OV - Overdue     B/L Lower Lumbar  / Quadratus Lumborum Pain - L > R.  Symptoms x 1 week.  Has annoying pain all day.  No aggravating factors.  Stretching unhelpful.  Using Tylenol 500mg 2 pills QD PRN c benefit.  Denies Trauma, loss of bowel / bladder function, LE numbness.       Morbid Obesity - Watching diet. - eating smaller portions, less junk food, no longer drinking soda since late 2/21, drinking more water, less salt.  +Exercise - Walking 20 minutes, 4 days per week.        GERD - Symptoms x months.  No taking OTC meds.  Watching GERD diet, but has symptoms with drinking water.  GERD sometimes causes insomnia.  No GI consult or EGD previously.           Anxiety / Depression - Symptoms worse x 2 months, has work stress.  She never started Viibryd 20mg QD as she was not focused on her health.  Previously on increased Buspar 15mg BID.  Feels  anxiety is stable.  Denies trigger.  No longer has anxiety attacks daily at home, while driving, at work.  Anxiety attacks last 30 seconds - 5 minutes. D/C Effexor XR 225mg QD due to worsening depression.   Last episode of unprovoked crying 10/28/21, occurring more often.    D/C Cymbalta 60mg QD as anxiety improved, but depression is worsened.  D/C Lexapro 20mg QD due to worsening anxiety.  D/C Zoloft 50mg QD x 3 weeks due to worsening anxiety.  Was seeing counselor monthly prior COVID-19 - last appt 2/20, which was helpful, but counselor relocated.  She was on a waiting list at 2 counseling offices as of 5/26/21, but both were not a good fit.  Her work schedule has been busy and she has not been able to reconnect c counseling as of 10/29/21.  She is again quick to anger, easily annoyed, has more unprovoked crying.  Decreased motivation.  She has a good relationship c her ex-.  Breathing exercises have been helpful.  Symptoms since 2013 since birth of twin sons. No other mood meds.  Good social supports.  No SI/HI/AH/VH.      Insomnia - Stable when GERD is controlled.  Previously on Trazodone 100mg QHS PRN - taking 1 pill QHS.  She can fall sleep after 45-60 (previosly 10-15) minutes, and can sleep without interruption for 8 hours when she takes Rx.  When she doesn't take Rx, it takes 45-60 minutes to fall asleep, awakens q2-3 hours, awake for 1-15 minutes, then can fall asleep again.  Unsure of trigger.  Previously, took 2-3 hours to fall asleep, sleeps for 2-3 hours, then awakens.  No longer drinks 8 oz caffeine 4 times weekly.  Denies snoring or witnessed apnea.  Melantonin 10mg QHS x 3 weeks was unhelpful for falling asleep and staying asleep.            PHQ-2/9 Depression Screening       Little interest or pleasure in doing things: 2 - more than half the days  Feeling down, depressed, or hopeless: 1 - several days  Trouble falling or staying asleep, or sleeping too much: 2 - more than half the  days  Feeling tired or having little energy: 2 - more than half the days  Poor appetite or overeatin - more than half the days  Feeling bad about yourself - or that you are a failure or have let yourself or your family down: 2 - more than half the days  Trouble concentrating on things, such as reading the newspaper or watching television: 2 - more than half the days  Moving or speaking so slowly that other people could have noticed. Or the opposite - being so fidgety or restless that you have been moving around a lot more than usual: 1 - several days  Thoughts that you would be better off dead, or of hurting yourself in some way: 0 - not at all  PHQ-9 Score: 14  PHQ-9 Interpretation: Moderate depression                BLADIMIR-7 Flowsheet Screening    Flowsheet Row Most Recent Value   Over the last 2 weeks, how often have you been bothered by any of the following problems?     Feeling nervous, anxious, or on edge 2   Not being able to stop or control worrying 1   Worrying too much about different things 1   Trouble relaxing 2   Being so restless that it is hard to sit still 1   Becoming easily annoyed or irritable 2   Feeling afraid as if something awful might happen 1   BLADIMIR-7 Total Score 10             Vitamin D Deficiency - s/p Drisdol.  Taking MVI and but not OTC Vitamin D 3,000IU daily.       Migraines - Last migraine .  Occurs less often - 1-2 times per month, once weekly when younger.  Has had migraines since 12yo.  Using Aleve / Ibuprofen / Tylenol PRN, rest, helpful.  B/L Ethmoid pain - feels like eye strain, can progress to achy, throbbing sensation.  Currently 0/10, Max 5-6/10.  Lasts 30-45 minutes to 2-3 hours.  Denies aura.  Sometimes has associated nausea.  Denies photophobia and phonophobia.  No migraine Rx previously.        Pelvic Pain / Bladder Spasms - Stable.  Occurs 3 times per month..  Management per Uro Dr. Donald.  Next appt PRN.  Previously on Ditropan 5mg BID, which patient does not  want to resume.  S/p stable transvaginal U/S 6/25/20 and pelvic exam per Gyn Dr. Madrigal 6/19/20.  Not taking Vistaril 25mg QHS PRN for pelvic pain per Gyn.       H/O Smoker - Quit cold turkey 3/1/21. Previously Smoking 4 cigarettes daily.  Wants to weaning/ / quit cold turkey as she has done so previously.           Reviewed:  Labs 4/30/24,  Gyn 6/19/20     Sees Gyn [facility].  Next appt 6/20 - Overdue.       Sees Dentist q6 months.  Sees Optho q2 years.      The following portions of the patient's history were reviewed and updated as appropriate: allergies, current medications, past family history, past medical history, past social history, past surgical history and problem list.      Review of Systems   Constitutional:  Positive for appetite change (Decreased intermittently on Trintellix). Negative for chills, diaphoresis, fatigue and fever.   Respiratory:  Negative for chest tightness and shortness of breath.    Cardiovascular:  Negative for chest pain.   Gastrointestinal:  Negative for abdominal pain, blood in stool, diarrhea, nausea and vomiting.   Genitourinary:  Negative for dysuria.   Psychiatric/Behavioral:  Positive for sleep disturbance. The patient is nervous/anxious.         Current Outpatient Medications   Medication Sig Dispense Refill   • busPIRone (BUSPAR) 7.5 mg tablet 1 pill twice daily, with 3rd dose PRN anxiety. 90 tablet 1   • ergocalciferol (ERGOCALCIFEROL) 1.25 MG (39782 UT) capsule Take 1 capsule (50,000 Units total) by mouth once a week for 12 doses 12 capsule 0   • Multiple Vitamin (MULTIVITAMIN) tablet Take 1 tablet by mouth daily     • omeprazole (PriLOSEC) 20 mg delayed release capsule Take 1 capsule (20 mg total) by mouth 2 (two) times a day 60 capsule 1   • Vortioxetine HBr (Trintellix) 20 MG tablet Take 1 tablet (20 mg total) by mouth daily 90 tablet 2     No current facility-administered medications for this visit.       Objective:  /86   Pulse 98   Temp 97.6 °F  "(36.4 °C)   Resp 16   Ht 5' 0.75\" (1.543 m)   Wt 97.1 kg (214 lb)   LMP 01/01/2016 (Within Years)   SpO2 97%   BMI 40.77 kg/m²    Wt Readings from Last 3 Encounters:   05/03/24 97.1 kg (214 lb)   03/22/24 99 kg (218 lb 3.2 oz)   10/29/21 88.5 kg (195 lb)      BP Readings from Last 3 Encounters:   05/03/24 126/86   03/22/24 122/74   06/17/21 110/55          Physical Exam  Vitals and nursing note reviewed.   Constitutional:       Appearance: Normal appearance. She is well-developed. She is obese.   HENT:      Head: Normocephalic and atraumatic.   Eyes:      Conjunctiva/sclera: Conjunctivae normal.   Neck:      Thyroid: No thyromegaly.   Cardiovascular:      Rate and Rhythm: Normal rate and regular rhythm.      Pulses: Normal pulses.      Heart sounds: Normal heart sounds.   Pulmonary:      Effort: Pulmonary effort is normal.      Breath sounds: Normal breath sounds.   Abdominal:      General: Bowel sounds are normal. There is no distension.      Palpations: Abdomen is soft. There is no mass.      Tenderness: There is no abdominal tenderness. There is no guarding or rebound.   Musculoskeletal:         General: No swelling or tenderness.      Cervical back: Neck supple.      Right lower leg: No edema.      Left lower leg: No edema.   Neurological:      General: No focal deficit present.      Mental Status: She is alert and oriented to person, place, and time.   Psychiatric:         Mood and Affect: Mood normal.         Behavior: Behavior normal.         Thought Content: Thought content normal.         Judgment: Judgment normal.         Lab Results:      Lab Results   Component Value Date    WBC 9.23 04/30/2024    HGB 12.8 04/30/2024    HCT 40.2 04/30/2024     04/30/2024    TRIG 157 (H) 04/30/2024    HDL 46 (L) 04/30/2024    LDLDIRECT 103 (H) 04/30/2024    ALT 33 04/30/2024    AST 16 04/30/2024    K 3.9 04/30/2024     04/30/2024    CREATININE 0.71 04/30/2024    BUN 12 04/30/2024    CO2 26 04/30/2024    " "GLUF 89 04/30/2024    HGBA1C 5.2 10/14/2021     No results found for: \"URICACID\"  Invalid input(s): \"BASENAME\" Vitamin D    No results found.     POCT Labs        Depression Screening and Follow-up Plan: Patient's depression screening was positive with a PHQ-9 score of 9. Patient advised to follow-up with PCP for further management.                     "

## 2024-05-06 ENCOUNTER — TELEPHONE (OUTPATIENT)
Dept: FAMILY MEDICINE CLINIC | Facility: CLINIC | Age: 34
End: 2024-05-06

## 2024-05-08 LAB
ALBUMIN SERPL BCP-MCNC: 4.2 G/DL (ref 3.5–5)
ALP SERPL-CCNC: 80 U/L (ref 34–104)
ALT SERPL W P-5'-P-CCNC: 33 U/L (ref 7–52)
ANION GAP SERPL CALCULATED.3IONS-SCNC: 7 MMOL/L (ref 4–13)
AST SERPL W P-5'-P-CCNC: 16 U/L (ref 13–39)
BILIRUB SERPL-MCNC: 0.36 MG/DL (ref 0.2–1)
BUN SERPL-MCNC: 12 MG/DL (ref 5–25)
CALCIUM SERPL-MCNC: 9.1 MG/DL (ref 8.4–10.2)
CHLORIDE SERPL-SCNC: 104 MMOL/L (ref 96–108)
CO2 SERPL-SCNC: 26 MMOL/L (ref 21–32)
CREAT SERPL-MCNC: 0.71 MG/DL (ref 0.6–1.3)
GFR SERPL CREATININE-BSD FRML MDRD: 113 ML/MIN/1.73SQ M
GLUCOSE P FAST SERPL-MCNC: 89 MG/DL (ref 65–99)
POTASSIUM SERPL-SCNC: 3.9 MMOL/L (ref 3.5–5.3)
PROT SERPL-MCNC: 7.6 G/DL (ref 6.4–8.4)
SODIUM SERPL-SCNC: 137 MMOL/L (ref 135–147)

## 2024-06-13 ENCOUNTER — LAB (OUTPATIENT)
Dept: LAB | Facility: CLINIC | Age: 34
End: 2024-06-13
Payer: COMMERCIAL

## 2024-06-13 DIAGNOSIS — F41.9 ANXIETY: ICD-10-CM

## 2024-06-13 DIAGNOSIS — F33.1 MODERATE EPISODE OF RECURRENT MAJOR DEPRESSIVE DISORDER (HCC): ICD-10-CM

## 2024-06-13 LAB
ALBUMIN SERPL BCP-MCNC: 4 G/DL (ref 3.5–5)
ALP SERPL-CCNC: 74 U/L (ref 34–104)
ALT SERPL W P-5'-P-CCNC: 25 U/L (ref 7–52)
ANION GAP SERPL CALCULATED.3IONS-SCNC: 7 MMOL/L (ref 4–13)
AST SERPL W P-5'-P-CCNC: 14 U/L (ref 13–39)
BILIRUB SERPL-MCNC: 0.32 MG/DL (ref 0.2–1)
BUN SERPL-MCNC: 13 MG/DL (ref 5–25)
CALCIUM SERPL-MCNC: 9.1 MG/DL (ref 8.4–10.2)
CHLORIDE SERPL-SCNC: 103 MMOL/L (ref 96–108)
CO2 SERPL-SCNC: 27 MMOL/L (ref 21–32)
CREAT SERPL-MCNC: 0.83 MG/DL (ref 0.6–1.3)
GFR SERPL CREATININE-BSD FRML MDRD: 94 ML/MIN/1.73SQ M
GLUCOSE P FAST SERPL-MCNC: 93 MG/DL (ref 65–99)
MAGNESIUM SERPL-MCNC: 2 MG/DL (ref 1.9–2.7)
POTASSIUM SERPL-SCNC: 3.7 MMOL/L (ref 3.5–5.3)
PROT SERPL-MCNC: 7.1 G/DL (ref 6.4–8.4)
SODIUM SERPL-SCNC: 137 MMOL/L (ref 135–147)

## 2024-06-13 PROCEDURE — 80053 COMPREHEN METABOLIC PANEL: CPT

## 2024-06-13 PROCEDURE — 36415 COLL VENOUS BLD VENIPUNCTURE: CPT

## 2024-06-13 PROCEDURE — 83735 ASSAY OF MAGNESIUM: CPT

## 2024-06-17 ENCOUNTER — OFFICE VISIT (OUTPATIENT)
Dept: FAMILY MEDICINE CLINIC | Facility: CLINIC | Age: 34
End: 2024-06-17
Payer: COMMERCIAL

## 2024-06-17 VITALS
OXYGEN SATURATION: 98 % | DIASTOLIC BLOOD PRESSURE: 68 MMHG | HEART RATE: 88 BPM | RESPIRATION RATE: 16 BRPM | SYSTOLIC BLOOD PRESSURE: 124 MMHG | HEIGHT: 61 IN | BODY MASS INDEX: 41.46 KG/M2 | WEIGHT: 219.6 LBS | TEMPERATURE: 98.1 F

## 2024-06-17 DIAGNOSIS — F41.9 ANXIETY: Primary | ICD-10-CM

## 2024-06-17 DIAGNOSIS — G47.09 OTHER INSOMNIA: ICD-10-CM

## 2024-06-17 DIAGNOSIS — G43.909 MIGRAINE WITHOUT STATUS MIGRAINOSUS, NOT INTRACTABLE, UNSPECIFIED MIGRAINE TYPE: ICD-10-CM

## 2024-06-17 DIAGNOSIS — E55.9 VITAMIN D DEFICIENCY: ICD-10-CM

## 2024-06-17 DIAGNOSIS — E66.01 MORBID OBESITY (HCC): ICD-10-CM

## 2024-06-17 DIAGNOSIS — F33.1 MODERATE EPISODE OF RECURRENT MAJOR DEPRESSIVE DISORDER (HCC): ICD-10-CM

## 2024-06-17 DIAGNOSIS — K21.9 GASTROESOPHAGEAL REFLUX DISEASE, UNSPECIFIED WHETHER ESOPHAGITIS PRESENT: ICD-10-CM

## 2024-06-17 PROCEDURE — 99214 OFFICE O/P EST MOD 30 MIN: CPT | Performed by: FAMILY MEDICINE

## 2024-06-17 RX ORDER — BUSPIRONE HYDROCHLORIDE 15 MG/1
15 TABLET ORAL 2 TIMES DAILY
Qty: 60 TABLET | Refills: 1 | Status: SHIPPED | OUTPATIENT
Start: 2024-06-17

## 2024-06-17 RX ORDER — HYDROXYZINE HYDROCHLORIDE 25 MG/1
12.5-25 TABLET, FILM COATED ORAL EVERY 6 HOURS PRN
Qty: 30 TABLET | Refills: 0 | Status: SHIPPED | OUTPATIENT
Start: 2024-06-17

## 2024-06-17 RX ORDER — OMEPRAZOLE 20 MG/1
20 CAPSULE, DELAYED RELEASE ORAL 2 TIMES DAILY
Qty: 60 CAPSULE | Refills: 1 | Status: SHIPPED | OUTPATIENT
Start: 2024-06-17

## 2024-06-17 NOTE — PROGRESS NOTES
Assessment/Plan:  Problem List Items Addressed This Visit        Cardiovascular and Mediastinum    Migraine without status migrainosus, not intractable     Stable s Rx.      For headache and migraine prevention I would suggest a combination vitamin supplement which may include 1 or more of the following ingredients:  Magnesium 400 mg , Riboflavin (vitamin B2) 400 - 600 mg,  Butterbur 150 mg (PA free). Other ingredients that may be helpful are feverfew, coenzyme Q10 100 mg three times a day,  melatonin and pamela.  Some example brands that combine some of these ingredients are Migravent or Dolovent.             Digestive    GERD (gastroesophageal reflux disease)     Improved.  Continue Omeprazole 20mg BID.  Pending GI consult as likely needs EGD.  Watch GERD diet.           Relevant Medications    omeprazole (PriLOSEC) 20 mg delayed release capsule       Behavioral Health    Anxiety - Primary     Improved.  Increase Buspar 15mg BID.  Continue Trintellix 20mg QD.   Counseling advised.           Relevant Medications    busPIRone (BUSPAR) 15 mg tablet    hydrOXYzine HCL (ATARAX) 25 mg tablet    Other Relevant Orders    Comprehensive metabolic panel    Magnesium    Episode of recurrent major depressive disorder (HCC)     Improved.  Increase Buspar 15mg BID.  Continue Trintellix 20mg QD.   Counseling advised.           Relevant Medications    busPIRone (BUSPAR) 15 mg tablet    hydrOXYzine HCL (ATARAX) 25 mg tablet    Other Relevant Orders    Comprehensive metabolic panel    Magnesium       Neurology/Sleep    Other insomnia     Unstable due to weather / temperature.  Previously stable on Trazodone 100mg QHS.  Counseling advised.              Other    Morbid obesity (HCC)     Worsening.  Recommend lifestyle modifications.  To consider GLP1-A in future PRN?           Vitamin D deficiency     Recurrent.      Vitamin D Deficiency - Recommend start multivitamin and prescription vitamin D (Drisdol).  When 12 weeks of Drisdol  completed, continue multivitamin and start over-the-counter Vitamin D3 1,000-3,000 International Units daily.  Check vitamin D level 1 week after completing Drisdol.                   Return in 6 weeks (on 7/29/2024) for F/U HL, Anx/Dep, GERD, Vit D Def, Labs; 9/3/24 4mo -HL, Anx/Dep, GERD, Vit D Def, Labs.      Future Appointments   Date Time Provider Department Center   8/6/2024  4:20 PM Eloise Hurley DO  [facility]   9/18/2024 10:40 AM Chitra Portillo MD [facility]        Subjective:     Cheryl is a 33 y.o. female who presents today for a follow-up on her chronic medical conditions.        HPI:  Chief Complaint   Patient presents with   • Follow-up     6 wk (F/U HL, Anx/Dep, GERD, Vit D Def, Labs; No new problems or concerns at this time.      -- Above per clinical staff and reviewed. --      HPI      Today:      Return in 6 weeks (on 6/14/2024) for F/U HL, Anx/Dep, GERD, Vit D Def, Labs; 9/3/24 4mo -HL, Anx/Dep, GERD, Vit D Def, Labs.     Return in 6 weeks (on 6/14/2024) for F/U HL, Anx/Dep, GERD, Vit D Def, Labs       Morbid Obesity - Watching diet. - eating smaller portions, less junk food, no longer drinking soda since late 2/21, drinking more water, less salt.  +Exercise - Walking 20 minutes, 4 days per week.       Hyperlipidemia - No statin previously.        GERD - Pending appt GI Dr. Portillo 9/24.  On Omeprazole 20mg BID.  Symptoms x months.  No taking OTC meds.  Watching GERD diet, but has symptoms with drinking water.  GERD sometimes causes insomnia.  No GI consult or EGD previously.           Anxiety / Depression - On Buspar 7.5mg TID PRN x 6 weeks - using TID  Feels mood improed 70% improved.  Still feels very anxious.  On Trintellix 20mg QD.  Her legs will sometimes shake, but less often.  She no longer has unprovoked crying or mental meltdowns.  Denies trigger.  Symptoms worse x 2 months, has work stress.  She never started Viibryd 20mg QD as she was not focused on her  health.  Previously on increased Buspar 15mg BID.  Feels anxiety is stable.  Denies trigger.  No longer has anxiety attacks daily at home, while driving, at work.  Anxiety attacks last 30 seconds - 5 minutes. D/C Effexor XR 225mg QD due to worsening depression.   Last episode of unprovoked crying 10/28/21, occurring more often.       D/C Cymbalta 60mg QD as anxiety improved, but depression is worsened.  D/C Lexapro 20mg QD due to worsening anxiety.  D/C Zoloft 50mg QD x 3 weeks due to worsening anxiety.  Was seeing counselor monthly prior COVID-19 - last appt 2/20, which was helpful, but counselor relocated.  She was on a waiting list at 2 counseling offices as of 5/26/21, but both were not a good fit.  Her work schedule has been busy and she has not been able to reconnect c counseling as of 10/29/21.   She has a good relationship c her ex-.  Breathing exercises have been helpful.  Symptoms since 2013 since birth of twin sons. No other mood meds.  Good social supports.  No SI/HI/AH/VH.      Insomnia - On Trintellix 20mg QD - unchanged.  She thinks weather / temperature changes are contributory.  Stable when GERD is controlled.  Previously on Trazodone 100mg QHS PRN - taking 1 pill QHS, she does not recall if helpful, but made her tired in AM.  She can fall sleep after 60 (previously 10-15) minutes, and can sleep without interruption for 8 hours when she takes Rx.  When she doesn't take Rx, it takes 60-90 minutes to fall asleep, awakens q2-3 hours, awake for 1-15 minutes, then can fall asleep again.  Unsure of trigger.  Previously, took 2-3 hours to fall asleep, sleeps for 2-3 hours, then awakens.  No longer drinks 8 oz caffeine 4 times weekly.  Denies snoring or witnessed apnea.  Melantonin 10mg QHS x 3 weeks was unhelpful for falling asleep and staying asleep.                  PHQ-2/9 Depression Screening    Little interest or pleasure in doing things: 1 - several days  Feeling down, depressed, or hopeless:  1 - several days  Trouble falling or staying asleep, or sleeping too much: 3 - nearly every day  Feeling tired or having little energy: 2 - more than half the days  Poor appetite or overeatin - several days  Feeling bad about yourself - or that you are a failure or have let yourself or your family down: 0 - not at all  Trouble concentrating on things, such as reading the newspaper or watching television: 0 - not at all  Moving or speaking so slowly that other people could have noticed. Or the opposite - being so fidgety or restless that you have been moving around a lot more than usual: 0 - not at all  Thoughts that you would be better off dead, or of hurting yourself in some way: 0 - not at all  PHQ-9 Score: 8  PHQ-9 Interpretation: Mild depression       BLADIMIR-7 Flowsheet Screening    Flowsheet Row Most Recent Value   Over the last 2 weeks, how often have you been bothered by any of the following problems?    Feeling nervous, anxious, or on edge 2   Not being able to stop or control worrying 1   Worrying too much about different things 1   Trouble relaxing 1   Being so restless that it is hard to sit still 2   Becoming easily annoyed or irritable 2   Feeling afraid as if something awful might happen 0   BLADIMIR-7 Total Score 9             Vitamin D Deficiency - On Drisdol.  Taking MVI and but not OTC Vitamin D 3,000IU daily.       Migraines - Last migraine .  Occurs less often - 1-2 times per month, once weekly when younger.  Has had migraines since 12yo.  Using Aleve / Ibuprofen / Tylenol PRN, rest, helpful.  B/L Ethmoid pain - feels like eye strain, can progress to achy, throbbing sensation.  Currently 0/10, Max 5-6/10.  Lasts 30-45 minutes to 2-3 hours.  Denies aura.  Sometimes has associated nausea.  Denies photophobia and phonophobia.  No migraine Rx previously.        H/O Smoker - Quit cold turkey 3/1/21. Previously Smoking 4 cigarettes daily.  Wants to weaning/ / quit cold turkey as she has done so  previously.                  From previous note:    Return in about 6 weeks (around 5/3/2024) for F/U Anx/Dep, GERD, Vit D Def, Labs.         B/L Lower Lumbar  / Quadratus Lumborum Pain - Improved c HEP.  Referred to [facility] PT 3/22/24, but did not attend.  L > R.  Symptoms x 1 week.  Has annoying pain all day.  No aggravating factors.  Stretching unhelpful.  Using Tylenol 500mg 2 pills QD PRN c benefit.  Denies Trauma, loss of bowel / bladder function, LE numbness.       Morbid Obesity - Watching diet. - eating smaller portions, less junk food, no longer drinking soda since late 2/21, drinking more water, less salt.  +Exercise - Walking 20 minutes, 4 days per week.       Hyperlipidemia - No statin previously.        GERD - Referred to GI on 3/22/24, but did not schedule appt as she left multiple messages s return call.  On Omeprazole 20mg BID.  Symptoms x months.  No taking OTC meds.  Watching GERD diet, but has symptoms with drinking water.  GERD sometimes causes insomnia.  No GI consult or EGD previously.           Anxiety / Depression - On Trintellix 20mg QD x 6 weeks.  Mood is 50% improved, but feels more anxious.  Her legs will sometimes shake.  She no longer has unprovoked crying or mental meltdowns.  Denies trigger.  Symptoms worse x 2 months, has work stress.  She never started Viibryd 20mg QD as she was not focused on her health.  Previously on increased Buspar 15mg BID.  Feels anxiety is stable.  Denies trigger.  No longer has anxiety attacks daily at home, while driving, at work.  Anxiety attacks last 30 seconds - 5 minutes. D/C Effexor XR 225mg QD due to worsening depression.   Last episode of unprovoked crying 10/28/21, occurring more often.       D/C Cymbalta 60mg QD as anxiety improved, but depression is worsened.  D/C Lexapro 20mg QD due to worsening anxiety.  D/C Zoloft 50mg QD x 3 weeks due to worsening anxiety.  Was seeing counselor monthly prior COVID-19 - last appt 2/20, which was helpful,  but counselor relocated.  She was on a waiting list at 2 counseling offices as of 21, but both were not a good fit.  Her work schedule has been busy and she has not been able to reconnect c counseling as of 10/29/21.   She has a good relationship c her ex-.  Breathing exercises have been helpful.  Symptoms since  since birth of twin sons. No other mood meds.  Good social supports.  No SI/HI/AH/VH.      Insomnia - On Trintellix 20mg QD x 6 weeks - unchanged.  She thinks weather / temperature changes are contributory.  Stable when GERD is controlled.  Previously on Trazodone 100mg QHS PRN - taking 1 pill QHS, she does not recall if helpful, but made her tired in AM.  She can fall sleep after 60 (previously 10-15) minutes, and can sleep without interruption for 8 hours when she takes Rx.  When she doesn't take Rx, it takes 45-60 minutes to fall asleep, awakens q2-3 hours, awake for 1-15 minutes, then can fall asleep again.  Unsure of trigger.  Previously, took 2-3 hours to fall asleep, sleeps for 2-3 hours, then awakens.  No longer drinks 8 oz caffeine 4 times weekly.  Denies snoring or witnessed apnea.  Melantonin 10mg QHS x 3 weeks was unhelpful for falling asleep and staying asleep.               PHQ-2/9 Depression Screening       Little interest or pleasure in doing things: 1 - several days  Feeling down, depressed, or hopeless: 1 - several days  Trouble falling or staying asleep, or sleeping too much: 2 - more than half the days  Feeling tired or having little energy: 1 - several days  Poor appetite or overeatin - several days  Feeling bad about yourself - or that you are a failure or have let yourself or your family down: 0 - not at all  Trouble concentrating on things, such as reading the newspaper or watching television: 1 - several days  Moving or speaking so slowly that other people could have noticed. Or the opposite - being so fidgety or restless that you have been moving around a lot  more than usual: 2 - more than half the days  Thoughts that you would be better off dead, or of hurting yourself in some way: 0 - not at all  PHQ-9 Score: 9  PHQ-9 Interpretation: Mild depression                BLADIMIR-7 Flowsheet Screening    Flowsheet Row Most Recent Value   Over the last 2 weeks, how often have you been bothered by any of the following problems?     Feeling nervous, anxious, or on edge 2   Not being able to stop or control worrying 1   Worrying too much about different things 1   Trouble relaxing 2   Being so restless that it is hard to sit still 2   Becoming easily annoyed or irritable 1   Feeling afraid as if something awful might happen 0   BLADIMIR-7 Total Score 9                Vitamin D Deficiency - s/p Drisdol.  Taking MVI and but not OTC Vitamin D 3,000IU daily.       Migraines - Last migraine 12/20.  Occurs less often - 1-2 times per month, once weekly when younger.  Has had migraines since 14yo.  Using Aleve / Ibuprofen / Tylenol PRN, rest, helpful.  B/L Ethmoid pain - feels like eye strain, can progress to achy, throbbing sensation.  Currently 0/10, Max 5-6/10.  Lasts 30-45 minutes to 2-3 hours.  Denies aura.  Sometimes has associated nausea.  Denies photophobia and phonophobia.  No migraine Rx previously.        H/O Smoker - Quit cold turkey 3/1/21. Previously Smoking 4 cigarettes daily.  Wants to weaning/ / quit cold turkey as she has done so previously.                       From previous note:     Return in about 6 weeks (around 12/10/2021) for Physical / 4mo- Anx/Dep, Insomnia, Migraine, Vit D Def, Obesity, Labs      4mo OV - Overdue     B/L Lower Lumbar  / Quadratus Lumborum Pain - L > R.  Symptoms x 1 week.  Has annoying pain all day.  No aggravating factors.  Stretching unhelpful.  Using Tylenol 500mg 2 pills QD PRN c benefit.  Denies Trauma, loss of bowel / bladder function, LE numbness.       Morbid Obesity - Watching diet. - eating smaller portions, less junk food, no longer drinking  soda since late 2/21, drinking more water, less salt.  +Exercise - Walking 20 minutes, 4 days per week.        GERD - Symptoms x months.  No taking OTC meds.  Watching GERD diet, but has symptoms with drinking water.  GERD sometimes causes insomnia.  No GI consult or EGD previously.           Anxiety / Depression - On Buspar 7.5mg TID PRN x 6 weeks.  Symptoms worse x 2 months, has work stress.  She never started Viibryd 20mg QD as she was not focused on her health.  Previously on increased Buspar 15mg BID.  Feels anxiety is stable.  Denies trigger.  No longer has anxiety attacks daily at home, while driving, at work.  Anxiety attacks last 30 seconds - 5 minutes. D/C Effexor XR 225mg QD due to worsening depression.   Last episode of unprovoked crying 10/28/21, occurring more often.    D/C Cymbalta 60mg QD as anxiety improved, but depression is worsened.  D/C Lexapro 20mg QD due to worsening anxiety.  D/C Zoloft 50mg QD x 3 weeks due to worsening anxiety.  Was seeing counselor monthly prior COVID-19 - last appt 2/20, which was helpful, but counselor relocated.  She was on a waiting list at 2 counseling offices as of 5/26/21, but both were not a good fit.  Her work schedule has been busy and she has not been able to reconnect c counseling as of 10/29/21.  She is again quick to anger, easily annoyed, has more unprovoked crying.  Decreased motivation.  She has a good relationship c her ex-.  Breathing exercises have been helpful.  Symptoms since 2013 since birth of twin sons. No other mood meds.  Good social supports.  No SI/HI/AH/VH.      Insomnia - Stable when GERD is controlled.  Previously on Trazodone 100mg QHS PRN - taking 1 pill QHS.  She can fall sleep after 45-60 (previosly 10-15) minutes, and can sleep without interruption for 8 hours when she takes Rx.  When she doesn't take Rx, it takes 45-60 minutes to fall asleep, awakens q2-3 hours, awake for 1-15 minutes, then can fall asleep again.  Unsure of  trigger.  Previously, took 2-3 hours to fall asleep, sleeps for 2-3 hours, then awakens.  No longer drinks 8 oz caffeine 4 times weekly.  Denies snoring or witnessed apnea.  Melantonin 10mg QHS x 3 weeks was unhelpful for falling asleep and staying asleep.            PHQ-2/9 Depression Screening       Little interest or pleasure in doing things: 2 - more than half the days  Feeling down, depressed, or hopeless: 1 - several days  Trouble falling or staying asleep, or sleeping too much: 2 - more than half the days  Feeling tired or having little energy: 2 - more than half the days  Poor appetite or overeatin - more than half the days  Feeling bad about yourself - or that you are a failure or have let yourself or your family down: 2 - more than half the days  Trouble concentrating on things, such as reading the newspaper or watching television: 2 - more than half the days  Moving or speaking so slowly that other people could have noticed. Or the opposite - being so fidgety or restless that you have been moving around a lot more than usual: 1 - several days  Thoughts that you would be better off dead, or of hurting yourself in some way: 0 - not at all  PHQ-9 Score: 14  PHQ-9 Interpretation: Moderate depression                  BLADIMIR-7 Flowsheet Screening    Flowsheet Row Most Recent Value   Over the last 2 weeks, how often have you been bothered by any of the following problems?     Feeling nervous, anxious, or on edge 2   Not being able to stop or control worrying 1   Worrying too much about different things 1   Trouble relaxing 2   Being so restless that it is hard to sit still 1   Becoming easily annoyed or irritable 2   Feeling afraid as if something awful might happen 1   BLADIMIR-7 Total Score 10             Vitamin D Deficiency - s/p Drisdol.  Taking MVI and but not OTC Vitamin D 3,000IU daily.       Migraines - Last migraine .  Occurs less often - 1-2 times per month, once weekly when younger.  Has had  migraines since 12yo.  Using Aleve / Ibuprofen / Tylenol PRN, rest, helpful.  B/L Ethmoid pain - feels like eye strain, can progress to achy, throbbing sensation.  Currently 0/10, Max 5-6/10.  Lasts 30-45 minutes to 2-3 hours.  Denies aura.  Sometimes has associated nausea.  Denies photophobia and phonophobia.  No migraine Rx previously.        Pelvic Pain / Bladder Spasms - Stable.  Occurs 3 times per month..  Management per Uro Dr. Donald.  Next appt PRN.  Previously on Ditropan 5mg BID, which patient does not want to resume.  S/p stable transvaginal U/S 6/25/20 and pelvic exam per Gyn Dr. Madrigal 6/19/20.  Not taking Vistaril 25mg QHS PRN for pelvic pain per Gyn.       H/O Smoker - Quit cold turkey 3/1/21. Previously Smoking 4 cigarettes daily.  Wants to weaning/ / quit cold turkey as she has done so previously.           Reviewed:  Labs 6/13/24,  Gyn 6/19/20     Sees Gyn [facility].  Next appt 6/20 - Overdue.       Sees Dentist q6 months.  Sees Optho q2 years.          The following portions of the patient's history were reviewed and updated as appropriate: allergies, current medications, past family history, past medical history, past social history, past surgical history and problem list.      Review of Systems   Constitutional:  Negative for appetite change, chills, diaphoresis, fatigue and fever.   Respiratory:  Negative for chest tightness and shortness of breath.    Cardiovascular:  Negative for chest pain.   Gastrointestinal:  Negative for abdominal pain, blood in stool, diarrhea, nausea and vomiting.   Genitourinary:  Negative for dysuria.   Psychiatric/Behavioral:  The patient is nervous/anxious.         Current Outpatient Medications   Medication Sig Dispense Refill   • busPIRone (BUSPAR) 15 mg tablet Take 1 tablet (15 mg total) by mouth 2 (two) times a day 60 tablet 1   • ergocalciferol (ERGOCALCIFEROL) 1.25 MG (07410 UT) capsule Take 1 capsule (50,000 Units total) by mouth once a week  "for 12 doses 12 capsule 0   • hydrOXYzine HCL (ATARAX) 25 mg tablet Take 0.5-1 tablets (12.5-25 mg total) by mouth every 6 (six) hours as needed for anxiety 30 tablet 0   • Multiple Vitamin (MULTIVITAMIN) tablet Take 1 tablet by mouth daily     • omeprazole (PriLOSEC) 20 mg delayed release capsule Take 1 capsule (20 mg total) by mouth 2 (two) times a day 60 capsule 1   • Vortioxetine HBr (Trintellix) 20 MG tablet Take 1 tablet (20 mg total) by mouth daily 90 tablet 2     No current facility-administered medications for this visit.       Objective:  /68   Pulse 88   Temp 98.1 °F (36.7 °C)   Resp 16   Ht 5' 0.75\" (1.543 m)   Wt 99.6 kg (219 lb 9.6 oz)   LMP 01/01/2016 (Within Years)   SpO2 98%   BMI 41.84 kg/m²    Wt Readings from Last 3 Encounters:   06/17/24 99.6 kg (219 lb 9.6 oz)   05/03/24 97.1 kg (214 lb)   03/22/24 99 kg (218 lb 3.2 oz)      BP Readings from Last 3 Encounters:   06/17/24 124/68   05/03/24 126/86   03/22/24 122/74          Physical Exam  Vitals and nursing note reviewed.   Constitutional:       Appearance: Normal appearance. She is well-developed. She is obese.   HENT:      Head: Normocephalic and atraumatic.   Eyes:      Conjunctiva/sclera: Conjunctivae normal.   Neck:      Thyroid: No thyromegaly.   Cardiovascular:      Rate and Rhythm: Normal rate and regular rhythm.      Pulses: Normal pulses.      Heart sounds: Normal heart sounds.   Pulmonary:      Effort: Pulmonary effort is normal.      Breath sounds: Normal breath sounds.   Musculoskeletal:         General: No swelling or tenderness.      Cervical back: Neck supple.      Right lower leg: No edema.      Left lower leg: No edema.   Lymphadenopathy:      Cervical: No cervical adenopathy.   Neurological:      General: No focal deficit present.      Mental Status: She is alert and oriented to person, place, and time.   Psychiatric:         Mood and Affect: Mood normal.         Behavior: Behavior normal.         Lab Results:  " "    Lab Results   Component Value Date    WBC 9.23 04/30/2024    HGB 12.8 04/30/2024    HCT 40.2 04/30/2024     04/30/2024    TRIG 157 (H) 04/30/2024    HDL 46 (L) 04/30/2024    LDLDIRECT 103 (H) 04/30/2024    ALT 25 06/13/2024    AST 14 06/13/2024    K 3.7 06/13/2024     06/13/2024    CREATININE 0.83 06/13/2024    BUN 13 06/13/2024    CO2 27 06/13/2024    GLUF 93 06/13/2024    HGBA1C 5.2 10/14/2021     No results found for: \"URICACID\"  Invalid input(s): \"BASENAME\" Vitamin D    No results found.     POCT Labs        Depression Screening and Follow-up Plan: Patient's depression screening was positive with a PHQ-9 score of 8. Patient advised to follow-up with PCP for further management.                     "

## 2024-08-27 ENCOUNTER — PREP FOR PROCEDURE (OUTPATIENT)
Dept: GASTROENTEROLOGY | Facility: AMBULARY SURGERY CENTER | Age: 34
End: 2024-08-27

## 2024-08-27 ENCOUNTER — CONSULT (OUTPATIENT)
Dept: GASTROENTEROLOGY | Facility: AMBULARY SURGERY CENTER | Age: 34
End: 2024-08-27
Payer: COMMERCIAL

## 2024-08-27 VITALS
SYSTOLIC BLOOD PRESSURE: 118 MMHG | WEIGHT: 225.8 LBS | DIASTOLIC BLOOD PRESSURE: 68 MMHG | HEIGHT: 61 IN | OXYGEN SATURATION: 99 % | HEART RATE: 88 BPM | BODY MASS INDEX: 42.63 KG/M2

## 2024-08-27 DIAGNOSIS — K21.9 GASTROESOPHAGEAL REFLUX DISEASE, UNSPECIFIED WHETHER ESOPHAGITIS PRESENT: Primary | ICD-10-CM

## 2024-08-27 PROCEDURE — 99204 OFFICE O/P NEW MOD 45 MIN: CPT | Performed by: INTERNAL MEDICINE

## 2024-08-27 NOTE — PATIENT INSTRUCTIONS
Scheduled date of EGD(as of today): Nov 12, 2024  Physician performing EGD: Dr. Portillo   Location of EGD: [facility]   Instructions reviewed with patient by: PAUL   Clearances: n/a

## 2024-08-27 NOTE — ASSESSMENT & PLAN NOTE
Chronic symptoms > 12 months, clinically stable, not previously evaluated  Initial typical GERD symptoms, responding to empiric PPI therapy  Discussed anti-reflux measures, and offered handout detailing, including weight loss, avoidance of lying down after meals, recognize/avoid trigger foods, and elevating the head of bed.   Plan for EGD to rule out Eosinophilic esophagitis, erosive GERD, celiac disease or other contributing

## 2024-08-27 NOTE — PROGRESS NOTES
Ambulatory Visit  Name: Cheryl Ayala      : 1990      MRN: [id number]  Encounter Provider: Chitra Portillo MD  Encounter Date: 2024   Encounter department: [facility] [place]    Assessment & Plan   1. Gastroesophageal reflux disease, unspecified whether esophagitis present  Assessment & Plan:  Chronic symptoms > 12 months, clinically stable, not previously evaluated  Initial typical GERD symptoms, responding to empiric PPI therapy  Discussed anti-reflux measures, and offered handout detailing, including weight loss, avoidance of lying down after meals, recognize/avoid trigger foods, and elevating the head of bed.   Plan for EGD to rule out Eosinophilic esophagitis, erosive GERD, celiac disease or other contributing    Orders:  -     Ambulatory Referral to Gastroenterology  -     EGD; Future; Expected date: 2024  -      right upper quadrant; Future; Expected date: 2024  -     omeprazole (PriLOSEC) 20 mg delayed release capsule; Take 1 capsule (20 mg total) by mouth 2 (two) times a day  2. BMI 40.0-44.9, adult (HCC)  Assessment & Plan:  Discussed lifestyle measures associated with acid reflux- including benefit of weight loss        History of Present Illness     Cheryl Ayala is a 33 y.o. female who presents for GERD.  She has anxiety, bladder spasm, major depressive disorder, HLD, migraine headaches, insomnia, vitamin D deficiency, BMI 43.  She has been having ongoing GERD symptoms for 2 years.  She started with symptoms of indigestion, heartburn, acid reflux, as well as nocturnal symptoms.  She states the symptoms occur throughout the day multiple times a day and intermittently.  She has been started on omeprazole 20 mg twice daily, and is tolerating this very well without any further breakthrough symptoms for the last 5 months.  She has no family history of GI cancers, celiac disease or IBD.  She is never previously had an EGD or colonoscopy.  She has bowel  "movements every other day, and denies any associated abdominal pain, rectal bleeding or other.  She denies any NSAID, tobacco use.    Abdominal Pain  The most recent episode lasted 0 hours. Associated symptoms include anorexia, belching and headaches. Pertinent negatives include no arthralgias, constipation, diarrhea, dysuria, fever, flatus, frequency, hematochezia, hematuria, melena, myalgias, nausea, vomiting or weight loss.         Review of Systems   Constitutional:  Negative for chills, fever and weight loss.   HENT:  Negative for ear pain and sore throat.    Eyes:  Negative for pain and visual disturbance.   Respiratory:  Negative for cough and shortness of breath.    Cardiovascular:  Negative for chest pain and palpitations.   Gastrointestinal:  Positive for abdominal pain and anorexia. Negative for constipation, diarrhea, flatus, hematochezia, melena, nausea and vomiting.   Genitourinary:  Negative for dysuria, frequency and hematuria.   Musculoskeletal:  Negative for arthralgias, back pain and myalgias.   Skin:  Negative for color change and rash.   Neurological:  Positive for headaches. Negative for seizures and syncope.   All other systems reviewed and are negative.      Objective     /68 (BP Location: Left arm, Patient Position: Sitting, Cuff Size: Large)   Pulse 88   Ht 5' 0.75\" (1.543 m)   Wt 102 kg (225 lb 12.8 oz)   LMP 01/01/2016 (Within Years)   SpO2 99%   BMI 43.02 kg/m²     Physical Exam  Vitals and nursing note reviewed.   Constitutional:       General: She is not in acute distress.     Appearance: Normal appearance.   HENT:      Head: Normocephalic and atraumatic.   Eyes:      Conjunctiva/sclera: Conjunctivae normal.   Cardiovascular:      Rate and Rhythm: Normal rate and regular rhythm.      Heart sounds: No murmur heard.  Pulmonary:      Effort: Pulmonary effort is normal. No respiratory distress.      Breath sounds: Normal breath sounds.   Abdominal:      Palpations: Abdomen is " soft.      Tenderness: There is no abdominal tenderness.   Musculoskeletal:         General: No swelling.      Cervical back: Neck supple.   Skin:     General: Skin is warm and dry.      Capillary Refill: Capillary refill takes less than 2 seconds.   Neurological:      Mental Status: She is alert.   Psychiatric:         Mood and Affect: Mood normal.       Lab Results   Component Value Date    WBC 9.23 04/30/2024    HGB 12.8 04/30/2024    HCT 40.2 04/30/2024    MCV 88 04/30/2024     04/30/2024       Lab Results   Component Value Date    SODIUM 137 06/13/2024    K 3.7 06/13/2024     06/13/2024    CO2 27 06/13/2024    AGAP 7 06/13/2024    BUN 13 06/13/2024    CREATININE 0.83 06/13/2024    GLUC 92 06/17/2021    GLUF 93 06/13/2024    CALCIUM 9.1 06/13/2024    AST 14 06/13/2024    ALT 25 06/13/2024    ALKPHOS 74 06/13/2024    TP 7.1 06/13/2024    TBILI 0.32 06/13/2024    EGFR 94 06/13/2024       I personally reviewed recent labs        Administrative Statements

## 2024-08-31 DIAGNOSIS — F33.1 MODERATE EPISODE OF RECURRENT MAJOR DEPRESSIVE DISORDER (HCC): ICD-10-CM

## 2024-08-31 DIAGNOSIS — F41.9 ANXIETY: ICD-10-CM

## 2024-09-01 RX ORDER — BUSPIRONE HYDROCHLORIDE 15 MG/1
15 TABLET ORAL 2 TIMES DAILY
Qty: 60 TABLET | Refills: 5 | Status: SHIPPED | OUTPATIENT
Start: 2024-09-01

## 2024-09-01 RX ORDER — HYDROXYZINE HYDROCHLORIDE 25 MG/1
12.5-25 TABLET, FILM COATED ORAL EVERY 6 HOURS PRN
Qty: 30 TABLET | Refills: 5 | Status: SHIPPED | OUTPATIENT
Start: 2024-09-01

## 2024-10-16 ENCOUNTER — OFFICE VISIT (OUTPATIENT)
Dept: FAMILY MEDICINE CLINIC | Facility: CLINIC | Age: 34
End: 2024-10-16
Payer: COMMERCIAL

## 2024-10-16 VITALS
RESPIRATION RATE: 16 BRPM | HEART RATE: 90 BPM | HEIGHT: 61 IN | DIASTOLIC BLOOD PRESSURE: 66 MMHG | TEMPERATURE: 97.8 F | BODY MASS INDEX: 44.29 KG/M2 | SYSTOLIC BLOOD PRESSURE: 114 MMHG | WEIGHT: 234.6 LBS | OXYGEN SATURATION: 98 %

## 2024-10-16 DIAGNOSIS — F33.1 MODERATE EPISODE OF RECURRENT MAJOR DEPRESSIVE DISORDER (HCC): ICD-10-CM

## 2024-10-16 DIAGNOSIS — E66.01 MORBID OBESITY (HCC): ICD-10-CM

## 2024-10-16 DIAGNOSIS — N32.89 BLADDER SPASM: ICD-10-CM

## 2024-10-16 DIAGNOSIS — Z23 ENCOUNTER FOR IMMUNIZATION: ICD-10-CM

## 2024-10-16 DIAGNOSIS — E55.9 VITAMIN D DEFICIENCY: ICD-10-CM

## 2024-10-16 DIAGNOSIS — K21.9 GASTROESOPHAGEAL REFLUX DISEASE, UNSPECIFIED WHETHER ESOPHAGITIS PRESENT: ICD-10-CM

## 2024-10-16 DIAGNOSIS — G43.909 MIGRAINE WITHOUT STATUS MIGRAINOSUS, NOT INTRACTABLE, UNSPECIFIED MIGRAINE TYPE: ICD-10-CM

## 2024-10-16 DIAGNOSIS — E78.5 HYPERLIPIDEMIA, UNSPECIFIED HYPERLIPIDEMIA TYPE: ICD-10-CM

## 2024-10-16 DIAGNOSIS — F41.9 ANXIETY: ICD-10-CM

## 2024-10-16 DIAGNOSIS — G47.09 OTHER INSOMNIA: Primary | ICD-10-CM

## 2024-10-16 PROCEDURE — 99214 OFFICE O/P EST MOD 30 MIN: CPT | Performed by: FAMILY MEDICINE

## 2024-10-16 PROCEDURE — 90471 IMMUNIZATION ADMIN: CPT

## 2024-10-16 PROCEDURE — 90656 IIV3 VACC NO PRSV 0.5 ML IM: CPT

## 2024-10-16 RX ORDER — TRAZODONE HYDROCHLORIDE 50 MG/1
TABLET, FILM COATED ORAL
Qty: 30 TABLET | Refills: 1 | Status: SHIPPED | OUTPATIENT
Start: 2024-10-16

## 2024-10-16 NOTE — PROGRESS NOTES
Assessment/Plan:  Assessment & Plan  Encounter for immunization    Orders:    influenza vaccine preservative-free 0.5 mL IM (Fluzone, Afluria, Fluarix, Flulaval)    Anxiety         Stable.  Continue Buspar 15mg BID and Trintellix 20mg QD.   Counseling advised.    Moderate episode of recurrent major depressive disorder (HCC)  Depression Screening Follow-up Plan: Patient's depression screening was positive with a PHQ-9 score of 7. Patient advised to follow-up with PCP for further management.         Stable.  Continue Buspar 15mg BID and Trintellix 20mg QD.   Counseling advised.   Vitamin D deficiency         Pending labs.  Continue MVI.    Migraine without status migrainosus, not intractable, unspecified migraine type         Stable s Rx.       For headache and migraine prevention I would suggest a combination vitamin supplement which may include 1 or more of the following ingredients:  Magnesium 400 mg , Riboflavin (vitamin B2) 400 - 600 mg,  Butterbur 150 mg (PA free). Other ingredients that may be helpful are feverfew, coenzyme Q10 100 mg three times a day,  melatonin and pamela.  Some example brands that combine some of these ingredients are Migravent or Dolovent.      Hyperlipidemia, unspecified hyperlipidemia type         Pending labs.   Previously Stable s statin.  Recommend lifestyle modifications.  Bladder spasm         Management per Uro.  F/U PRN.  Stable s Ditropan 5mg BID.    Gastroesophageal reflux disease, unspecified whether esophagitis present         Management per GI.  Continue Omeprazole 20mg BID.  Pending EGD.  Watch GERD diet.    Morbid obesity (HCC)           Worsening.  Recommend lifestyle modifications.  To consider GLP1-A in future PRN?       Other insomnia    Orders:    traZODone (DESYREL) 50 mg tablet; 1/2 pill by mouth nightly PRN x 3 days, then increase to 1 pill by mouth nightly PRN.     Uncontrolled.  Restart Trazodone 50mg QHS. Counseling advised.      Return in about 6 weeks (around  11/27/2024) for F/U HL, Anx/Dep, Insomnia, GERD, Vit D Def, Labs.      Future Appointments   Date Time Provider Department Center   11/12/2024  2:15 PM Chitra Portillo MD [facility]   11/25/2024  8:40 AM Eloise Hurley DO [facility]        Subjective:     Cheryl is a 33 y.o. female who presents today for a follow-up on her chronic medical conditions.        HPI:  Chief Complaint   Patient presents with    Follow-up     4mo -HL, Anx/Dep, GERD, Vit D Def, Labs. No new problems or concerns at this time.      -- Above per clinical staff and reviewed. --      HPI      Today:      Return in 6 weeks (on 7/29/2024) for F/U HL, Anx/Dep, GERD, Vit D Def, Labs - No appt scheduled.    Return 9/3/24 4mo -HL, Anx/Dep, GERD, Vit D Def, Labs.     4mo OV - Overdue    Patient did not complete labs 5/3/24.    Morbid Obesity - Watching diet. - eating smaller portions, less junk food, no longer drinking soda since late 2/21, drinking more water, less salt.  +Exercise - Walking 20 minutes, 4 days per week.       Hyperlipidemia - No statin previously.        GERD - Management per GI Dr. Portillo - Next appt 11/24 for EGD.  On Omeprazole 20mg BID.  Symptoms x months.  No taking OTC meds.  Watching GERD diet, but has symptoms with drinking water.  GERD sometimes causes insomnia.  No GI consult or EGD previously.           Anxiety / Depression - On increased Buspar 15mg BID x 4 months.  Feels mood improed 60-70% improved.  Still feels very anxious.  On Trintellix 20mg QD.  No longer has leg shaking.  She no longer has unprovoked crying or mental meltdowns.  Denies trigger.  Has work stress.  She never started Viibryd 20mg QD as she was not focused on her health.  Previously on increased Buspar 15mg BID.  Feels anxiety is stable.  Denies trigger.  No longer has anxiety attacks.  D/C Effexor XR 225mg QD due to worsening depression.       D/C Cymbalta 60mg QD as anxiety improved, but depression is worsened.  D/C Lexapro  20mg QD due to worsening anxiety.  D/C Zoloft 50mg QD x 3 weeks due to worsening anxiety.  Was seeing counselor monthly prior COVID-19 - last appt , which was helpful, but counselor relocated.  She was on a waiting list at 2 counseling offices as of 21, but both were not a good fit.  Her work schedule has been busy and she has not been able to reconnect c counseling as of 10/29/21.   She has a good relationship c her ex-.  Breathing exercises have been helpful.  Symptoms since  since birth of twin sons. No other mood meds.  Good social supports.  No SI/HI/AH/VH.      Insomnia - On increased Buspar 15mg BID x 4 months - unchanged.  On Trintellix 20mg QD - unchanged.  Takes 1.5 hours to fall asleep, awake 2-3 times per night.  Previously on Trazodone 100mg QHS PRN - taking 1 pill QHS, she does not recall if helpful, but made her tired in AM.  She can fall sleep after 60 (previously 10-15) minutes, and can sleep without interruption for 8 hours when she takes Rx.  When she doesn't take Rx, it takes 60-90 minutes to fall asleep, awakens q2-3 hours, awake for 1-15 minutes, then can fall asleep again.  Unsure of trigger.  Previously, took 2-3 hours to fall asleep, sleeps for 2-3 hours, then awakens.  Drinks 8 oz caffeine daily.  Denies snoring or witnessed apnea.  Melantonin 10mg QHS x 3 weeks was unhelpful for falling asleep and staying asleep.                  PHQ-2/9 Depression Screening    Little interest or pleasure in doing things: 1 - several days  Feeling down, depressed, or hopeless: 1 - several days  Trouble falling or staying asleep, or sleeping too much: 2 - more than half the days  Feeling tired or having little energy: 2 - more than half the days  Poor appetite or overeatin - several days  Feeling bad about yourself - or that you are a failure or have let yourself or your family down: 0 - not at all  Trouble concentrating on things, such as reading the newspaper or watching  television: 0 - not at all  Moving or speaking so slowly that other people could have noticed. Or the opposite - being so fidgety or restless that you have been moving around a lot more than usual: 0 - not at all  Thoughts that you would be better off dead, or of hurting yourself in some way: 0 - not at all  PHQ-9 Score: 7  PHQ-9 Interpretation: Mild depression       BLADIMIR-7 Flowsheet Screening      Flowsheet Row Most Recent Value   Over the last two weeks, how often have you been bothered by the following problems?     Feeling nervous, anxious, or on edge 1   Worrying too much about different things 1   Trouble relaxing  0   Being so restless that it's hard to sit still 1   Becoming easily annoyed or irritable  0   Feeling afraid as if something awful might happen 1   How difficult have these problems made it for you to do your work, take care of things at home, or get along with other people?  Somewhat difficult   BLADIMIR Score  4                Vitamin D Deficiency - s/p Drisdol.  Taking MVI and but not OTC Vitamin D 3,000IU daily.       Migraines - Last migraine 12/20.  Occurs less often - 1-2 times per month, once weekly when younger.  Has had migraines since 14yo.  Using Aleve / Ibuprofen / Tylenol PRN, rest, helpful.  B/L Ethmoid pain - feels like eye strain, can progress to achy, throbbing sensation.  Currently 0/10, Max 5-6/10.  Lasts 30-45 minutes to 2-3 hours.  Denies aura.  Sometimes has associated nausea.  Denies photophobia and phonophobia.  No migraine Rx previously.         Pelvic Pain / Bladder Spasms - Stable.  Occurs 3 times per month..  Management per Uro Dr. Donald.  Next appt PRN.  Previously on Ditropan 5mg BID, which patient does not want to resume.  S/p stable transvaginal U/S 6/25/20 and pelvic exam per Gyn Dr. Madrigal 6/19/20.  Not taking Vistaril 25mg QHS PRN for pelvic pain per Gyn.       H/O Smoker - Quit cold turkey 3/1/21. Previously Smoking 4 cigarettes daily.  Wants to weaning/ / quit  "cold turkey as she has done so previously.           Reviewed:  Labs 6/13/24,  Gyn 6/19/20     Sees Gyn [facility].  Next appt 6/20 - Overdue.         The following portions of the patient's history were reviewed and updated as appropriate: allergies, current medications, past family history, past medical history, past social history, past surgical history and problem list.      Review of Systems   Constitutional:  Positive for fatigue. Negative for appetite change, chills, diaphoresis and fever.   Respiratory:  Negative for chest tightness and shortness of breath.    Cardiovascular:  Negative for chest pain.   Gastrointestinal:  Negative for abdominal pain, blood in stool, diarrhea, nausea and vomiting.   Genitourinary:  Negative for dysuria.   Psychiatric/Behavioral:  Positive for sleep disturbance.         Current Outpatient Medications   Medication Sig Dispense Refill    busPIRone (BUSPAR) 15 mg tablet Take 1 tablet (15 mg total) by mouth 2 (two) times a day 60 tablet 5    hydrOXYzine HCL (ATARAX) 25 mg tablet Take 0.5-1 tablets (12.5-25 mg total) by mouth every 6 (six) hours as needed for anxiety 30 tablet 5    Multiple Vitamin (MULTIVITAMIN) tablet Take 1 tablet by mouth daily      omeprazole (PriLOSEC) 20 mg delayed release capsule Take 1 capsule (20 mg total) by mouth 2 (two) times a day (Patient taking differently: Take 20 mg by mouth 2 (two) times a day Rx per BID) 180 capsule 3    traZODone (DESYREL) 50 mg tablet 1/2 pill by mouth nightly PRN x 3 days, then increase to 1 pill by mouth nightly PRN. 30 tablet 1    Vortioxetine HBr (Trintellix) 20 MG tablet Take 1 tablet (20 mg total) by mouth daily 90 tablet 2     No current facility-administered medications for this visit.       Objective:  /66   Pulse 90   Temp 97.8 °F (36.6 °C)   Resp 16   Ht 5' 0.75\" (1.543 m)   Wt 106 kg (234 lb 9.6 oz)   LMP 01/01/2016 (Within Years)   SpO2 98%   BMI 44.69 kg/m²    Wt Readings from Last 3 " Encounters:   10/16/24 106 kg (234 lb 9.6 oz)   08/27/24 102 kg (225 lb 12.8 oz)   06/17/24 99.6 kg (219 lb 9.6 oz)      BP Readings from Last 3 Encounters:   10/16/24 114/66   08/27/24 118/68   06/17/24 124/68          Physical Exam  Vitals and nursing note reviewed.   Constitutional:       Appearance: Normal appearance. She is well-developed. She is obese.   HENT:      Head: Normocephalic and atraumatic.   Eyes:      Conjunctiva/sclera: Conjunctivae normal.   Neck:      Thyroid: No thyromegaly.   Cardiovascular:      Rate and Rhythm: Normal rate and regular rhythm.      Pulses: Normal pulses.      Heart sounds: Normal heart sounds.   Pulmonary:      Effort: Pulmonary effort is normal.      Breath sounds: Normal breath sounds.   Abdominal:      General: Bowel sounds are normal. There is no distension.      Palpations: Abdomen is soft. There is no mass.      Tenderness: There is no abdominal tenderness. There is no guarding or rebound.   Musculoskeletal:         General: No swelling or tenderness.      Cervical back: Neck supple.      Right lower leg: No edema.      Left lower leg: No edema.   Lymphadenopathy:      Cervical: No cervical adenopathy.   Neurological:      General: No focal deficit present.      Mental Status: She is alert and oriented to person, place, and time. Mental status is at baseline.   Psychiatric:         Mood and Affect: Mood normal.         Behavior: Behavior normal.         Thought Content: Thought content normal.         Judgment: Judgment normal.         Lab Results:      Lab Results   Component Value Date    WBC 9.23 04/30/2024    HGB 12.8 04/30/2024    HCT 40.2 04/30/2024     04/30/2024    TRIG 157 (H) 04/30/2024    HDL 46 (L) 04/30/2024    LDLDIRECT 103 (H) 04/30/2024    ALT 25 06/13/2024    AST 14 06/13/2024    K 3.7 06/13/2024     06/13/2024    CREATININE 0.83 06/13/2024    BUN 13 06/13/2024    CO2 27 06/13/2024    GLUF 93 06/13/2024    HGBA1C 5.2 10/14/2021     No  "results found for: \"URICACID\"  Invalid input(s): \"BASENAME\" Vitamin D    No results found.     POCT Labs        Depression Screening and Follow-up Plan: Patient's depression screening was positive with a PHQ-9 score of 7. Patient advised to follow-up with PCP for further management.                     "

## 2024-10-17 NOTE — ASSESSMENT & PLAN NOTE
Orders:    traZODone (DESYREL) 50 mg tablet; 1/2 pill by mouth nightly PRN x 3 days, then increase to 1 pill by mouth nightly PRN.

## 2024-10-17 NOTE — ASSESSMENT & PLAN NOTE
Depression Screening Follow-up Plan: Patient's depression screening was positive with a PHQ-9 score of 7. Patient advised to follow-up with PCP for further management.         Stable.  Continue Buspar 15mg BID and Trintellix 20mg QD.   Counseling advised.

## 2024-11-12 ENCOUNTER — ANESTHESIA EVENT (OUTPATIENT)
Dept: GASTROENTEROLOGY | Facility: AMBULARY SURGERY CENTER | Age: 34
End: 2024-11-12
Payer: COMMERCIAL

## 2024-11-12 ENCOUNTER — HOSPITAL ENCOUNTER (OUTPATIENT)
Dept: GASTROENTEROLOGY | Facility: AMBULARY SURGERY CENTER | Age: 34
Setting detail: OUTPATIENT SURGERY
Discharge: HOME | End: 2024-11-12
Attending: INTERNAL MEDICINE
Payer: COMMERCIAL

## 2024-11-12 VITALS
BODY MASS INDEX: 44.56 KG/M2 | HEIGHT: 61 IN | RESPIRATION RATE: 17 BRPM | HEART RATE: 80 BPM | SYSTOLIC BLOOD PRESSURE: 111 MMHG | TEMPERATURE: 97.1 F | DIASTOLIC BLOOD PRESSURE: 75 MMHG | OXYGEN SATURATION: 99 % | WEIGHT: 236 LBS

## 2024-11-12 DIAGNOSIS — K21.9 GASTROESOPHAGEAL REFLUX DISEASE, UNSPECIFIED WHETHER ESOPHAGITIS PRESENT: ICD-10-CM

## 2024-11-12 PROCEDURE — 88305 TISSUE EXAM BY PATHOLOGIST: CPT | Performed by: PATHOLOGY

## 2024-11-12 PROCEDURE — 43239 EGD BIOPSY SINGLE/MULTIPLE: CPT | Performed by: INTERNAL MEDICINE

## 2024-11-12 RX ORDER — SODIUM CHLORIDE, SODIUM LACTATE, POTASSIUM CHLORIDE, CALCIUM CHLORIDE 600; 310; 30; 20 MG/100ML; MG/100ML; MG/100ML; MG/100ML
INJECTION, SOLUTION INTRAVENOUS CONTINUOUS PRN
Status: DISCONTINUED | OUTPATIENT
Start: 2024-11-12 | End: 2024-11-12

## 2024-11-12 RX ORDER — LIDOCAINE HYDROCHLORIDE 10 MG/ML
INJECTION, SOLUTION EPIDURAL; INFILTRATION; INTRACAUDAL; PERINEURAL AS NEEDED
Status: DISCONTINUED | OUTPATIENT
Start: 2024-11-12 | End: 2024-11-12

## 2024-11-12 RX ORDER — PROPOFOL 10 MG/ML
INJECTION, EMULSION INTRAVENOUS AS NEEDED
Status: DISCONTINUED | OUTPATIENT
Start: 2024-11-12 | End: 2024-11-12

## 2024-11-12 RX ADMIN — SODIUM CHLORIDE, SODIUM LACTATE, POTASSIUM CHLORIDE, AND CALCIUM CHLORIDE: .6; .31; .03; .02 INJECTION, SOLUTION INTRAVENOUS at 15:19

## 2024-11-12 RX ADMIN — LIDOCAINE HYDROCHLORIDE 50 MG: 10 INJECTION, SOLUTION EPIDURAL; INFILTRATION; INTRACAUDAL; PERINEURAL at 15:22

## 2024-11-12 RX ADMIN — PROPOFOL 50 MG: 10 INJECTION, EMULSION INTRAVENOUS at 15:26

## 2024-11-12 RX ADMIN — PROPOFOL 50 MG: 10 INJECTION, EMULSION INTRAVENOUS at 15:24

## 2024-11-12 RX ADMIN — PROPOFOL 100 MG: 10 INJECTION, EMULSION INTRAVENOUS at 15:22

## 2024-11-12 RX ADMIN — PROPOFOL 50 MG: 10 INJECTION, EMULSION INTRAVENOUS at 15:28

## 2024-11-12 NOTE — H&P
History and Physical - [facility]  Cheryl Ayala 33 y.o. female MRN: [id number]                  HPI: Cheryl Ayala is a 33 y.o. year old female who presents for GERD      REVIEW OF SYSTEMS: Per the HPI, and otherwise unremarkable.    Historical Information   Past Medical History:   Diagnosis Date    Anxiety 2019    Bladder spasm 2021    Chronic pelvic pain in female     Resolved 2017     Chronic vaginitis     Resolved 2017     Class 1 obesity without serious comorbidity with body mass index (BMI) of 32.0 to 32.9 in adult 2019    Class 1 obesity without serious comorbidity with body mass index (BMI) of 34.0 to 34.9 in adult 2019    Class 2 obesity without serious comorbidity with body mass index (BMI) of 39.0 to 39.9 in adult 2019    COVID-19 2020    Depression 2019    Dysmenorrhea     Resolved 2017     Dyspareunia in female     Resolved 2017     Endometriosis of left broad ligament     Resolved 8/3/2017     Episode of recurrent major depressive disorder (HCC) 2019    GERD (gastroesophageal reflux disease) 2023    Daily acid reflux, does not matter what is eaten or drank.    Headache(784.0)     Hot flashes     Resolved 2017     Hyperlipidemia 2024    Migraine     Migraine without status migrainosus, not intractable 2021    Obesity 2019    Other insomnia 2020    Smoker 2021    Vitamin D deficiency      Past Surgical History:   Procedure Laterality Date     SECTION       x 1 - twins    HYSTERECTOMY  2017    Endometriosis - Still has ovaries    SALPINGECTOMY Bilateral 2017    Endometriosis    TUBAL LIGATION  2016    WISDOM TOOTH EXTRACTION       Social History   Social History     Substance and Sexual Activity   Alcohol Use Yes    Alcohol/week: 2.0 standard drinks of alcohol    Types: 2 Glasses of wine per week    Comment: occasionally      Social History     Substance and  "Sexual Activity   Drug Use Never     Social History     Tobacco Use   Smoking Status Former    Current packs/day: 0.00    Average packs/day: 0.5 packs/day for 10.3 years (5.1 ttl pk-yrs)    Types: Cigarettes    Start date: 11/14/2010    Quit date: 3/1/2021    Years since quitting: 3.7    Passive exposure: Never   Smokeless Tobacco Never     Family History   Problem Relation Age of Onset    Hypertension Father     Migraines Father     Hyperlipidemia Father     Asthma Mother     No Known Problems Brother     No Known Problems Son     No Known Problems Son     Depression Paternal Grandmother     Cancer Paternal Grandmother         Type Unknown       Meds/Allergies       Current Outpatient Medications:     busPIRone (BUSPAR) 15 mg tablet    hydrOXYzine HCL (ATARAX) 25 mg tablet    Multiple Vitamin (MULTIVITAMIN) tablet    omeprazole (PriLOSEC) 20 mg delayed release capsule    traZODone (DESYREL) 50 mg tablet    Vortioxetine HBr (Trintellix) 20 MG tablet    Allergies   Allergen Reactions    Zoloft [Sertraline] Anxiety       Objective     Ht 5' 1\" (1.549 m)   Wt 107 kg (236 lb)   LMP 01/01/2016 (Within Years)   BMI 44.59 kg/m²       PHYSICAL EXAM    Gen: NAD  Head: NCAT  CV: RRR  CHEST: Clear  ABD: soft, NT/ND  EXT: no edema      ASSESSMENT/PLAN:  This is a 33 y.o. year old female here for EGD, and she is stable and optimized for her procedure.        "

## 2024-11-12 NOTE — ANESTHESIA PREPROCEDURE EVALUATION
Procedure:  EGD    Relevant Problems   ANESTHESIA (within normal limits)      CARDIO   (+) Hyperlipidemia   (+) Migraine without status migrainosus, not intractable      ENDO (within normal limits)      GI/HEPATIC   (+) GERD (gastroesophageal reflux disease)      /RENAL (within normal limits)      GYN (within normal limits)      HEMATOLOGY (within normal limits)      MUSCULOSKELETAL (within normal limits)      NEURO/PSYCH   (+) Anxiety   (+) Episode of recurrent major depressive disorder (HCC)   (+) Migraine without status migrainosus, not intractable      PULMONARY (within normal limits)      Other   (+) Morbid obesity (HCC)      NPO > 8 hours      Physical Exam    Airway    Mallampati score: II  TM Distance: >3 FB  Neck ROM: full     Dental       Cardiovascular  Rhythm: regular, Rate: normal, Cardiovascular exam normal    Pulmonary  Pulmonary exam normal Breath sounds clear to auscultation    Other Findings  post-pubertal.      Anesthesia Plan  ASA Score- 3     Anesthesia Type- IV sedation with anesthesia with ASA Monitors.         Additional Monitors:     Airway Plan:            Plan Factors-Exercise tolerance (METS): >4 METS.    Chart reviewed.   Existing labs reviewed. Patient summary reviewed.          Obstructive sleep apnea risk education given perioperatively.        Induction- intravenous.    Postoperative Plan-     Perioperative Resuscitation Plan - Level 1 - Full Code.       Informed Consent- Anesthetic plan and risks discussed with patient.  I personally reviewed this patient with the CRNA. Discussed and agreed on the Anesthesia Plan with the CRNA..

## 2024-11-12 NOTE — ANESTHESIA POSTPROCEDURE EVALUATION
Post-Op Assessment Note    CV Status:  Stable    Pain management: adequate       Mental Status:  Alert and awake   Hydration Status:  Euvolemic   PONV Controlled:  Controlled   Airway Patency:  Patent     Post Op Vitals Reviewed: Yes    No anethesia notable event occurred.    Staff: CRNA           Last Filed PACU Vitals:  Vitals Value Taken Time   Temp 97.1 °F (36.2 °C) 11/12/24 1535   Pulse 97 11/12/24 1535   /93 11/12/24 1535   Resp 16 11/12/24 1535   SpO2 98 % 11/12/24 1535       Modified Christi:  Activity: 2 (11/12/2024  3:35 PM)  Respiration: 2 (11/12/2024  3:35 PM)  Circulation: 2 (11/12/2024  3:35 PM)  Consciousness: 2 (11/12/2024  3:35 PM)  Oxygen Saturation: 2 (11/12/2024  3:35 PM)  Modified Christi Score: 10 (11/12/2024  3:35 PM)

## 2024-11-18 ENCOUNTER — RESULTS FOLLOW-UP (OUTPATIENT)
Dept: GASTROENTEROLOGY | Facility: CLINIC | Age: 34
End: 2024-11-18

## 2024-11-18 PROCEDURE — 88305 TISSUE EXAM BY PATHOLOGIST: CPT | Performed by: PATHOLOGY

## 2024-12-09 ENCOUNTER — OFFICE VISIT (OUTPATIENT)
Dept: FAMILY MEDICINE CLINIC | Facility: CLINIC | Age: 34
End: 2024-12-09
Payer: COMMERCIAL

## 2024-12-09 ENCOUNTER — APPOINTMENT (OUTPATIENT)
Dept: LAB | Facility: CLINIC | Age: 34
End: 2024-12-09
Payer: COMMERCIAL

## 2024-12-09 ENCOUNTER — RESULTS FOLLOW-UP (OUTPATIENT)
Dept: FAMILY MEDICINE CLINIC | Facility: CLINIC | Age: 34
End: 2024-12-09

## 2024-12-09 VITALS
HEIGHT: 61 IN | HEART RATE: 88 BPM | SYSTOLIC BLOOD PRESSURE: 122 MMHG | RESPIRATION RATE: 16 BRPM | TEMPERATURE: 98 F | WEIGHT: 240.6 LBS | DIASTOLIC BLOOD PRESSURE: 72 MMHG | BODY MASS INDEX: 45.42 KG/M2 | OXYGEN SATURATION: 98 %

## 2024-12-09 DIAGNOSIS — E66.01 MORBID OBESITY (HCC): ICD-10-CM

## 2024-12-09 DIAGNOSIS — E78.5 HYPERLIPIDEMIA, UNSPECIFIED HYPERLIPIDEMIA TYPE: ICD-10-CM

## 2024-12-09 DIAGNOSIS — G47.09 OTHER INSOMNIA: ICD-10-CM

## 2024-12-09 DIAGNOSIS — F41.9 ANXIETY: ICD-10-CM

## 2024-12-09 DIAGNOSIS — Z13.6 SCREENING FOR CARDIOVASCULAR CONDITION: ICD-10-CM

## 2024-12-09 DIAGNOSIS — F33.0 MILD EPISODE OF RECURRENT MAJOR DEPRESSIVE DISORDER (HCC): ICD-10-CM

## 2024-12-09 DIAGNOSIS — F33.1 MODERATE EPISODE OF RECURRENT MAJOR DEPRESSIVE DISORDER (HCC): ICD-10-CM

## 2024-12-09 DIAGNOSIS — F33.1 MODERATE EPISODE OF RECURRENT MAJOR DEPRESSIVE DISORDER (HCC): Primary | ICD-10-CM

## 2024-12-09 DIAGNOSIS — E55.9 VITAMIN D DEFICIENCY: ICD-10-CM

## 2024-12-09 DIAGNOSIS — G43.909 MIGRAINE WITHOUT STATUS MIGRAINOSUS, NOT INTRACTABLE, UNSPECIFIED MIGRAINE TYPE: ICD-10-CM

## 2024-12-09 DIAGNOSIS — K21.9 GASTROESOPHAGEAL REFLUX DISEASE, UNSPECIFIED WHETHER ESOPHAGITIS PRESENT: ICD-10-CM

## 2024-12-09 DIAGNOSIS — R73.01 IFG (IMPAIRED FASTING GLUCOSE): Primary | ICD-10-CM

## 2024-12-09 LAB
25(OH)D3 SERPL-MCNC: 14.6 NG/ML (ref 30–100)
ALBUMIN SERPL BCG-MCNC: 4.1 G/DL (ref 3.5–5)
ALP SERPL-CCNC: 101 U/L (ref 34–104)
ALT SERPL W P-5'-P-CCNC: 27 U/L (ref 7–52)
ANION GAP SERPL CALCULATED.3IONS-SCNC: 6 MMOL/L (ref 4–13)
AST SERPL W P-5'-P-CCNC: 14 U/L (ref 13–39)
BILIRUB SERPL-MCNC: 0.33 MG/DL (ref 0.2–1)
BUN SERPL-MCNC: 15 MG/DL (ref 5–25)
CALCIUM SERPL-MCNC: 9.2 MG/DL (ref 8.4–10.2)
CHLORIDE SERPL-SCNC: 103 MMOL/L (ref 96–108)
CHOLEST SERPL-MCNC: 199 MG/DL (ref ?–200)
CO2 SERPL-SCNC: 28 MMOL/L (ref 21–32)
CREAT SERPL-MCNC: 0.85 MG/DL (ref 0.6–1.3)
GFR SERPL CREATININE-BSD FRML MDRD: 90 ML/MIN/1.73SQ M
GLUCOSE P FAST SERPL-MCNC: 101 MG/DL (ref 65–99)
HDLC SERPL-MCNC: 52 MG/DL
LDLC SERPL CALC-MCNC: 99 MG/DL (ref 0–100)
LDLC SERPL DIRECT ASSAY-MCNC: 102 MG/DL (ref 0–100)
MAGNESIUM SERPL-MCNC: 2 MG/DL (ref 1.9–2.7)
NONHDLC SERPL-MCNC: 147 MG/DL
POTASSIUM SERPL-SCNC: 3.9 MMOL/L (ref 3.5–5.3)
PROT SERPL-MCNC: 7.5 G/DL (ref 6.4–8.4)
SODIUM SERPL-SCNC: 137 MMOL/L (ref 135–147)
TRIGL SERPL-MCNC: 240 MG/DL (ref ?–150)
TSH SERPL DL<=0.05 MIU/L-ACNC: 1.86 UIU/ML (ref 0.45–4.5)

## 2024-12-09 PROCEDURE — 36415 COLL VENOUS BLD VENIPUNCTURE: CPT

## 2024-12-09 PROCEDURE — 83721 ASSAY OF BLOOD LIPOPROTEIN: CPT

## 2024-12-09 PROCEDURE — 80053 COMPREHEN METABOLIC PANEL: CPT

## 2024-12-09 PROCEDURE — 82306 VITAMIN D 25 HYDROXY: CPT

## 2024-12-09 PROCEDURE — 80061 LIPID PANEL: CPT

## 2024-12-09 PROCEDURE — 84443 ASSAY THYROID STIM HORMONE: CPT

## 2024-12-09 PROCEDURE — 83735 ASSAY OF MAGNESIUM: CPT

## 2024-12-09 PROCEDURE — 99214 OFFICE O/P EST MOD 30 MIN: CPT | Performed by: FAMILY MEDICINE

## 2024-12-09 RX ORDER — BUSPIRONE HYDROCHLORIDE 15 MG/1
15 TABLET ORAL 2 TIMES DAILY
Qty: 180 TABLET | Refills: 2 | Status: SHIPPED | OUTPATIENT
Start: 2024-12-09

## 2024-12-09 RX ORDER — ERGOCALCIFEROL 1.25 MG/1
50000 CAPSULE ORAL WEEKLY
Qty: 12 CAPSULE | Refills: 0 | Status: SHIPPED | OUTPATIENT
Start: 2024-12-09 | End: 2025-02-25

## 2024-12-09 RX ORDER — TRAZODONE HYDROCHLORIDE 50 MG/1
50 TABLET, FILM COATED ORAL
Qty: 90 TABLET | Refills: 2 | Status: SHIPPED | OUTPATIENT
Start: 2024-12-09

## 2024-12-09 NOTE — ASSESSMENT & PLAN NOTE
Stable.  Continue Buspar 15mg BID and Trintellix 20mg QD.   Counseling advised.     Orders:    TSH, 3rd generation with Free T4 reflex; Future    busPIRone (BUSPAR) 15 mg tablet; Take 1 tablet (15 mg total) by mouth 2 (two) times a day

## 2024-12-09 NOTE — RESULT ENCOUNTER NOTE
Unstable labs - will review with patient at upcoming appointment.    Elevated fasting glucose-101.  Nurse to see orders for hemoglobin A1c that I added to patient's future blood work in 4 months.  Recommend lifestyle modifications -low sugar, low carbohydrate diet, and exercise.    Hyperlipidemia - Elevated LDL (bad), HDL (good) cholesterol, and triglycerides (fats in the blood) - Recommend lifestyle modifications - low fat, low cholesterol, low sugar, low carbohydrate diet, and exercise.    Other resulted labs are stable, while some are still pending.      Message sent to patient via [employer] patient portal.    Nurse to also call patient.

## 2024-12-09 NOTE — ASSESSMENT & PLAN NOTE
Management per GI.  Continue Omeprazole 20mg BID.  Pending EGD.  Watch GERD diet.    Orders:    Magnesium; Future

## 2024-12-09 NOTE — ASSESSMENT & PLAN NOTE
Stable.  Continue Buspar 15mg BID and Trintellix 20mg QD.   Counseling advised.   Depression Screening Follow-up Plan: Patient's depression screening was positive with a PHQ-9 score of 9. Patient advised to follow-up with PCP for further management.    Orders:    TSH, 3rd generation with Free T4 reflex; Future    busPIRone (BUSPAR) 15 mg tablet; Take 1 tablet (15 mg total) by mouth 2 (two) times a day

## 2024-12-09 NOTE — ASSESSMENT & PLAN NOTE
Pending labs. Previously Stable s statin. Recommend lifestyle modifications.     Orders:    CBC and differential; Future    Comprehensive metabolic panel; Future    Lipid panel; Future    TSH, 3rd generation with Free T4 reflex; Future    LDL cholesterol, direct; Future

## 2024-12-09 NOTE — PROGRESS NOTES
Assessment/Plan:  Assessment & Plan  Moderate episode of recurrent major depressive disorder (HCC)    Stable.  Continue Buspar 15mg BID and Trintellix 20mg QD.   Counseling advised.   Depression Screening Follow-up Plan: Patient's depression screening was positive with a PHQ-9 score of 9. Patient advised to follow-up with PCP for further management.    Orders:    TSH, 3rd generation with Free T4 reflex; Future    busPIRone (BUSPAR) 15 mg tablet; Take 1 tablet (15 mg total) by mouth 2 (two) times a day    Anxiety      Stable.  Continue Buspar 15mg BID and Trintellix 20mg QD.   Counseling advised.     Orders:    TSH, 3rd generation with Free T4 reflex; Future    busPIRone (BUSPAR) 15 mg tablet; Take 1 tablet (15 mg total) by mouth 2 (two) times a day    Other insomnia    Stable on trazodone 50 mg nightly as needed.  Orders:    TSH, 3rd generation with Free T4 reflex; Future    traZODone (DESYREL) 50 mg tablet; Take 1 tablet (50 mg total) by mouth daily at bedtime as needed for sleep    Hyperlipidemia, unspecified hyperlipidemia type    Pending labs. Previously Stable s statin. Recommend lifestyle modifications.     Orders:    CBC and differential; Future    Comprehensive metabolic panel; Future    Lipid panel; Future    TSH, 3rd generation with Free T4 reflex; Future    LDL cholesterol, direct; Future    Vitamin D deficiency    Pending labs. Continue MVI.     Orders:    Vitamin D 25 hydroxy; Future    Gastroesophageal reflux disease, unspecified whether esophagitis present    Management per GI.  Continue Omeprazole 20mg BID.  Pending EGD.  Watch GERD diet.    Orders:    Magnesium; Future    Morbid obesity (HCC)      Worsening. Recommend lifestyle modifications. To consider GLP1-A in future PRN?     Orders:    Vitamin D 25 hydroxy; Future    Screening for cardiovascular condition    Orders:    CBC and differential; Future    Comprehensive metabolic panel; Future    Lipid panel; Future    LDL cholesterol, direct;  Future    Mild episode of recurrent major depressive disorder (HCC)  Depression Screening Follow-up Plan: Patient's depression screening was positive with a PHQ-9 score of 9.     Orders:    Vortioxetine HBr (Trintellix) 20 MG tablet; Take 1 tablet (20 mg total) by mouth daily          Return in about 4 months (around 4/9/2025) for Physical / 4mo - HL, Anx/Dep, Insomnia, GERD, Vit D Def, Labs.      Future Appointments   Date Time Provider Department Center   4/14/2025  8:40 AM Eloise Hurley,  [facility]        Subjective:     Cheryl is a 34 y.o. female who presents today for a follow-up on her chronic medical conditions.        HPI:  Chief Complaint   Patient presents with    Follow-up     6 week F/U HL, Anx/Dep, Insomnia, GERD, Vit D Def, Labs.      -- Above per clinical staff and reviewed. --      HPI      Today:    Return in about 6 weeks (around 11/27/2024) for F/U HL, Anx/Dep, Insomnia, GERD, Vit D Def, Labs.     Pending labs completed 12/9/24 today.         Patient did not complete labs 5/3/24.     Morbid Obesity - Watching diet. - eating smaller portions, less junk food, no longer drinking soda since late 2/21, drinking more water, less salt.  No Exercise - Previously Walking 20 minutes, 4 days per week.       Hyperlipidemia - No statin previously.        GERD / Hiatal Hernia - Management per GI Dr. Portillo - Next appt PRN.  On Omeprazole 20mg BID.  Symptoms x months.  No taking OTC meds.  Watching GERD diet, but has symptoms with drinking water.  GERD sometimes causes insomnia.  s/p EGD 11/12/24.         Anxiety / Depression - On increased Buspar 15mg BID.  Feels mood improed 60-70% improved.  Still feels very anxious.  On Trintellix 20mg QD.  No longer has leg shaking.  She no longer has unprovoked crying or mental meltdowns.  Denies trigger.  Has work stress.  She never started Viibryd 20mg QD as she was not focused on her health.  Previously on increased Buspar 15mg BID.  Feels anxiety is  stable.  Denies trigger.  No longer has anxiety attacks.  D/C Effexor XR 225mg QD due to worsening depression.        D/C Cymbalta 60mg QD as anxiety improved, but depression is worsened.  D/C Lexapro 20mg QD due to worsening anxiety.  D/C Zoloft 50mg QD x 3 weeks due to worsening anxiety.  Was seeing counselor monthly prior COVID-19 - last appt , which was helpful, but counselor relocated.  She was on a waiting list at 2 counseling offices as of 21, but both were not a good fit.  Her work schedule has been busy and she has not been able to reconnect c counseling as of 10/29/21.   She has a good relationship c her ex-.  Breathing exercises have been helpful.  Symptoms since  since birth of twin sons. No other mood meds.  Good social supports.  No SI/HI/AH/VH.      Insomnia - Trazodone 50mg QHS x 6 weeks.  On increased Buspar 15mg BID.  On Trintellix 20mg QD - unchanged.  Takes 30-45 minutes (previously 1.5) hours to fall asleep, awake 1 (previously 2-3) times per night.  Sleeps 6-7 hours nightly and feels well rested in AM.  Previously on Trazodone 100mg QHS PRN - taking 1 pill QHS, she does not recall if helpful, but made her tired in AM.   Unsure of trigger.   Drinks 8 oz caffeine daily.  Denies snoring or witnessed apnea.  Melantonin 10mg QHS x 3 weeks was unhelpful for falling asleep and staying asleep.                  PHQ-2/9 Depression Screening    Little interest or pleasure in doing things: 1 - several days  Feeling down, depressed, or hopeless: 1 - several days  Trouble falling or staying asleep, or sleeping too much: 1 - several days  Feeling tired or having little energy: 1 - several days  Poor appetite or overeatin - several days  Feeling bad about yourself - or that you are a failure or have let yourself or your family down: 1 - several days  Trouble concentrating on things, such as reading the newspaper or watching television: 1 - several days  Moving or speaking so slowly that  other people could have noticed. Or the opposite - being so fidgety or restless that you have been moving around a lot more than usual: 2 - more than half the days  Thoughts that you would be better off dead, or of hurting yourself in some way: 0 - not at all  PHQ-9 Score: 9  PHQ-9 Interpretation: Mild depression       BLADIMIR-7 Flowsheet Screening      Flowsheet Row Most Recent Value   Over the last two weeks, how often have you been bothered by the following problems?     Feeling nervous, anxious, or on edge 1   Not being able to stop or control worrying 1   Worrying too much about different things 1   Trouble relaxing  1   Being so restless that it's hard to sit still 1   Becoming easily annoyed or irritable  1   Feeling afraid as if something awful might happen 0   How difficult have these problems made it for you to do your work, take care of things at home, or get along with other people?  Somewhat difficult   BLADIMIR Score  6               Vitamin D Deficiency - s/p Drisdol.  Taking MVI and but not OTC Vitamin D 3,000IU daily.       Migraines - Last migraine 12/20.  Occurs less often - 1-2 times per month, once weekly when younger.  Has had migraines since 12yo.  Using Aleve / Ibuprofen / Tylenol PRN, rest, helpful.  B/L Ethmoid pain - feels like eye strain, can progress to achy, throbbing sensation.  Currently 0/10, Max 5-6/10.  Lasts 30-45 minutes to 2-3 hours.  Denies aura.  Sometimes has associated nausea.  Denies photophobia and phonophobia.  No migraine Rx previously.         Pelvic Pain / Bladder Spasms - Stable.  Occurs 3 times per month..  Management per U[facility] Dr. Donald.  Next appt PRN.  Previously on Ditropan 5mg BID, which patient does not want to resume.  S/p stable transvaginal U/S 6/25/20 and pelvic exam per Gyn Dr. Madrigal 6/19/20.  Not taking Vistaril 25mg QHS PRN for pelvic pain per Gyn.       H/O Smoker - Quit cold turkey 3/1/21. Previously Smoking 4 cigarettes daily.  Wants to weaning/ / quit  cold turkey as she has done so previously.               From previous note:         Return 9/3/24 4mo -HL, Anx/Dep, GERD, Vit D Def, Labs.      4mo OV - Overdue     Patient did not complete labs 5/3/24.     Morbid Obesity - Watching diet. - eating smaller portions, less junk food, no longer drinking soda since late 2/21, drinking more water, less salt.  +Exercise - Walking 20 minutes, 4 days per week.       Hyperlipidemia - No statin previously.        GERD - Management per GI Dr. Portillo - Next appt 11/24 for EGD.  On Omeprazole 20mg BID.  Symptoms x months.  No taking OTC meds.  Watching GERD diet, but has symptoms with drinking water.  GERD sometimes causes insomnia.  No GI consult or EGD previously.           Anxiety / Depression - On increased Buspar 15mg BID x 4 months.  Feels mood improed 60-70% improved.  Still feels very anxious.  On Trintellix 20mg QD.  No longer has leg shaking.  She no longer has unprovoked crying or mental meltdowns.  Denies trigger.  Has work stress.  She never started Viibryd 20mg QD as she was not focused on her health.  Previously on increased Buspar 15mg BID.  Feels anxiety is stable.  Denies trigger.  No longer has anxiety attacks.  D/C Effexor XR 225mg QD due to worsening depression.        D/C Cymbalta 60mg QD as anxiety improved, but depression is worsened.  D/C Lexapro 20mg QD due to worsening anxiety.  D/C Zoloft 50mg QD x 3 weeks due to worsening anxiety.  Was seeing counselor monthly prior COVID-19 - last appt 2/20, which was helpful, but counselor relocated.  She was on a waiting list at 2 counseling offices as of 5/26/21, but both were not a good fit.  Her work schedule has been busy and she has not been able to reconnect c counseling as of 10/29/21.   She has a good relationship c her ex-.  Breathing exercises have been helpful.  Symptoms since 2013 since birth of twin sons. No other mood meds.  Good social supports.  No SI/HI/AH/VH.      Insomnia - On increased  Buspar 15mg BID x 4 months - unchanged.  On Trintellix 20mg QD - unchanged.  Takes 1.5 hours to fall asleep, awake 2-3 times per night.  Previously on Trazodone 100mg QHS PRN - taking 1 pill QHS, she does not recall if helpful, but made her tired in AM.  She can fall sleep after 60 (previously 10-15) minutes, and can sleep without interruption for 8 hours when she takes Rx.  When she doesn't take Rx, it takes 60-90 minutes to fall asleep, awakens q2-3 hours, awake for 1-15 minutes, then can fall asleep again.  Unsure of trigger.  Previously, took 2-3 hours to fall asleep, sleeps for 2-3 hours, then awakens.  Drinks 8 oz caffeine daily.  Denies snoring or witnessed apnea.  Melantonin 10mg QHS x 3 weeks was unhelpful for falling asleep and staying asleep.                  PHQ-2/9 Depression Screening      Little interest or pleasure in doing things: 1 - several days  Feeling down, depressed, or hopeless: 1 - several days  Trouble falling or staying asleep, or sleeping too much: 2 - more than half the days  Feeling tired or having little energy: 2 - more than half the days  Poor appetite or overeatin - several days  Feeling bad about yourself - or that you are a failure or have let yourself or your family down: 0 - not at all  Trouble concentrating on things, such as reading the newspaper or watching television: 0 - not at all  Moving or speaking so slowly that other people could have noticed. Or the opposite - being so fidgety or restless that you have been moving around a lot more than usual: 0 - not at all  Thoughts that you would be better off dead, or of hurting yourself in some way: 0 - not at all  PHQ-9 Score: 7  PHQ-9 Interpretation: Mild depression            BLADIMIR-7 Flowsheet Screening       Flowsheet Row Most Recent Value   Over the last two weeks, how often have you been bothered by the following problems?      Feeling nervous, anxious, or on edge 1   Worrying too much about different things 1   Trouble  relaxing  0   Being so restless that it's hard to sit still 1   Becoming easily annoyed or irritable  0   Feeling afraid as if something awful might happen 1   How difficult have these problems made it for you to do your work, take care of things at home, or get along with other people?  Somewhat difficult   BLADIMIR Score  4                   Vitamin D Deficiency - s/p Drisdol.  Taking MVI and but not OTC Vitamin D 3,000IU daily.       Migraines - Last migraine 12/20.  Occurs less often - 1-2 times per month, once weekly when younger.  Has had migraines since 14yo.  Using Aleve / Ibuprofen / Tylenol PRN, rest, helpful.  B/L Ethmoid pain - feels like eye strain, can progress to achy, throbbing sensation.  Currently 0/10, Max 5-6/10.  Lasts 30-45 minutes to 2-3 hours.  Denies aura.  Sometimes has associated nausea.  Denies photophobia and phonophobia.  No migraine Rx previously.         Pelvic Pain / Bladder Spasms - Stable.  Occurs 3 times per month..  Management per [facility] Dr. Donald.  Next appt PRN.  Previously on Ditropan 5mg BID, which patient does not want to resume.  S/p stable transvaginal U/S 6/25/20 and pelvic exam per Gyn Dr. Madrigal 6/19/20.  Not taking Vistaril 25mg QHS PRN for pelvic pain per Gyn.       H/O Smoker - Quit cold turkey 3/1/21. Previously Smoking 4 cigarettes daily.  Wants to weaning/ / quit cold turkey as she has done so previously.           Reviewed:  Labs 6/13/24,  Gyn 6/19/20     Sees Gyn [facility].  Next appt 6/20 - Overdue.         The following portions of the patient's history were reviewed and updated as appropriate: allergies, current medications, past family history, past medical history, past social history, past surgical history and problem list.      Review of Systems   Constitutional:  Negative for appetite change, chills, diaphoresis, fatigue and fever.   Respiratory:  Negative for chest tightness and shortness of breath.    Cardiovascular:  Negative for chest pain.  "  Gastrointestinal:  Negative for abdominal pain, blood in stool, diarrhea, nausea and vomiting.   Genitourinary:  Negative for dysuria.        Current Outpatient Medications   Medication Sig Dispense Refill    busPIRone (BUSPAR) 15 mg tablet Take 1 tablet (15 mg total) by mouth 2 (two) times a day 180 tablet 2    hydrOXYzine HCL (ATARAX) 25 mg tablet Take 0.5-1 tablets (12.5-25 mg total) by mouth every 6 (six) hours as needed for anxiety 30 tablet 5    Multiple Vitamin (MULTIVITAMIN) tablet Take 1 tablet by mouth daily      omeprazole (PriLOSEC) 20 mg delayed release capsule Take 1 capsule (20 mg total) by mouth 2 (two) times a day 180 capsule 3    traZODone (DESYREL) 50 mg tablet Take 1 tablet (50 mg total) by mouth daily at bedtime as needed for sleep 90 tablet 2    Vortioxetine HBr (Trintellix) 20 MG tablet Take 1 tablet (20 mg total) by mouth daily 90 tablet 2     No current facility-administered medications for this visit.       Objective:  /72   Pulse 88   Temp 98 °F (36.7 °C) (Temporal)   Resp 16   Ht 5' 0.75\" (1.543 m)   Wt 109 kg (240 lb 9.6 oz)   LMP 01/01/2016 (Within Years)   SpO2 98%   BMI 45.84 kg/m²    Wt Readings from Last 3 Encounters:   12/09/24 109 kg (240 lb 9.6 oz)   11/12/24 107 kg (236 lb)   10/16/24 106 kg (234 lb 9.6 oz)      BP Readings from Last 3 Encounters:   12/09/24 122/72   11/12/24 111/75   10/16/24 114/66          Physical Exam  Vitals and nursing note reviewed.   Constitutional:       Appearance: Normal appearance. She is well-developed. She is obese.   HENT:      Head: Normocephalic and atraumatic.   Eyes:      Conjunctiva/sclera: Conjunctivae normal.   Neck:      Thyroid: No thyromegaly.   Cardiovascular:      Rate and Rhythm: Normal rate and regular rhythm.      Pulses: Normal pulses.      Heart sounds: Normal heart sounds.   Pulmonary:      Effort: Pulmonary effort is normal.      Breath sounds: Normal breath sounds.   Musculoskeletal:         General: No " "swelling or tenderness.      Cervical back: Neck supple.      Right lower leg: No edema.      Left lower leg: No edema.   Neurological:      General: No focal deficit present.      Mental Status: She is alert and oriented to person, place, and time.   Psychiatric:         Mood and Affect: Mood normal.         Behavior: Behavior normal.         Thought Content: Thought content normal.         Judgment: Judgment normal.         Lab Results:      Lab Results   Component Value Date    WBC 9.23 04/30/2024    HGB 12.8 04/30/2024    HCT 40.2 04/30/2024     04/30/2024    TRIG 240 (H) 12/09/2024    HDL 52 12/09/2024    LDLDIRECT 102 (H) 12/09/2024    ALT 27 12/09/2024    AST 14 12/09/2024    K 3.9 12/09/2024     12/09/2024    CREATININE 0.85 12/09/2024    BUN 15 12/09/2024    CO2 28 12/09/2024    GLUF 101 (H) 12/09/2024    HGBA1C 5.2 10/14/2021     No results found for: \"URICACID\"  Invalid input(s): \"BASENAME\" Vitamin D    EGD  Result Date: 11/12/2024  Narrative: Table formatting from the original result was not included. [facility] [place] [address] [facility] [address] [phone] DATE OF SERVICE: 11/12/24 PHYSICIAN(S): Attending: Chitra Portillo MD Fellow: No Staff Documented INDICATION: Gastroesophageal reflux disease, unspecified whether esophagitis present POST-OP DIAGNOSIS: See the impression below. PREPROCEDURE: Informed consent was obtained for the procedure, including sedation.  Risks of perforation, hemorrhage, adverse drug reaction and aspiration were discussed. The patient was placed in the left lateral decubitus position. Patient was explained about the risks and benefits of the procedure. Risks including but not limited to bleeding, infection, and perforation were explained in detail. Also explained about less than 100% sensitivity with the exam and other alternatives. PROCEDURE: EGD DETAILS OF PROCEDURE: Patient was taken to the procedure room where a time out " was performed to confirm correct patient and correct procedure. The patient underwent monitored anesthesia care, which was administered by an anesthesia professional. The patient's blood pressure, heart rate, level of consciousness, respirations, oxygen, ECG and ETCO2 were monitored throughout the procedure. The scope was introduced through the mouth and advanced to the second part of the duodenum. Retroflexion was performed in the fundus. The patient experienced no blood loss. The procedure was not difficult. The patient tolerated the procedure well. There were no apparent adverse events. ANESTHESIA INFORMATION: ASA: III Anesthesia Type: IV Sedation with Anesthesia MEDICATIONS: No administrations occurring from 1519 to 1531 on 11/12/24 FINDINGS: The middle third of the esophagus, GE junction, body of the stomach, antrum, duodenal bulb and 2nd part of the duodenum appeared normal. Z-line is 35 cm from the incisors. Performed random biopsy using biopsy forceps. Small sliding hiatal hernia (type I hiatal hernia):  Hill classification: Grade II SPECIMENS: ID Type Source Tests Collected by Time Destination 1 : duodenal bx Tissue Duodenum TISSUE EXAM Chitra Portillo MD 11/12/2024  3:23 PM  2 : gastric bx Tissue Stomach TISSUE EXAM Chitra Portillo MD 11/12/2024  3:24 PM  3 : random esophagus bx Tissue Esophagus TISSUE EXAM Chitra Portillo MD 11/12/2024  3:26 PM      Impression: The middle third of the esophagus, GE junction, body of the stomach, antrum, duodenal bulb and 2nd part of the duodenum appeared normal. Performed random biopsy. Small type I hiatal hernia RECOMMENDATION: Await pathology results Continue omeprazole Avoid NSAIDs Resume regular diet   Chitra Portillo MD        POCT Labs        Depression Screening and Follow-up Plan: Patient's depression screening was positive with a PHQ-9 score of 9. Patient advised to follow-up with PCP for further management.

## 2024-12-09 NOTE — ASSESSMENT & PLAN NOTE
Worsening. Recommend lifestyle modifications. To consider GLP1-A in future PRN?     Orders:    Vitamin D 25 hydroxy; Future

## 2024-12-09 NOTE — ASSESSMENT & PLAN NOTE
Stable on trazodone 50 mg nightly as needed.  Orders:    TSH, 3rd generation with Free T4 reflex; Future    traZODone (DESYREL) 50 mg tablet; Take 1 tablet (50 mg total) by mouth daily at bedtime as needed for sleep

## 2024-12-09 NOTE — ASSESSMENT & PLAN NOTE
Depression Screening Follow-up Plan: Patient's depression screening was positive with a PHQ-9 score of 9.     Orders:    Vortioxetine HBr (Trintellix) 20 MG tablet; Take 1 tablet (20 mg total) by mouth daily

## 2024-12-10 NOTE — RESULT ENCOUNTER NOTE
Unstable labs - please call patient.    Elevated fasting glucose-101.  Nurse to see orders for hemoglobin A1c that I added to patient's future blood work in 4 months.  Recommend lifestyle modifications -low sugar, low carbohydrate diet, and exercise.    Hyperlipidemia - Elevated LDL (bad), HDL (good) cholesterol, and triglycerides (fats in the blood) - Recommend lifestyle modifications - low fat, low cholesterol, low sugar, low carbohydrate diet, and exercise.    Vitamin D Deficiency - Recurrent.  Recommend start multivitamin and prescription vitamin D (Drisdol).  When 12 weeks of Drisdol completed, continue multivitamin and start over-the-counter Vitamin D3 3,000 International Units daily.  Check vitamin D level 1 week after completing Drisdol.  eRx sent.  Nurse to see lab orders.      Message sent to patient via [employer] patient portal.    Nurse to also call patient.

## 2024-12-11 NOTE — TELEPHONE ENCOUNTER
----- Message from Eloise Hurley DO sent at 12/9/2024 12:34 PM EST -----  Unstable labs - will review with patient at upcoming appointment.    Elevated fasting glucose-101.  Nurse to see orders for hemoglobin A1c that I added to patient's future blood work in 4 months.  Recommend lifestyle modifications -low sugar, low carbohydrate diet, and exercise.    Hyperlipidemia - Elevated LDL (bad), HDL (good) cholesterol, and triglycerides (fats in the blood) - Recommend lifestyle modifications - low fat, low cholesterol, low sugar, low carbohydrate diet, and exercise.    Other resulted labs are stable, while some are still pending.      Message sent to patient via [employer] patient portal.    Nurse to also call patient.

## 2024-12-11 NOTE — TELEPHONE ENCOUNTER
LVM to pt relaying lab results and life style modification recommendations. Informed patient that this information was also sent to her via [employer]. I provided pt with office phone number incase she has any questions or concerns.

## 2024-12-12 ENCOUNTER — PATIENT MESSAGE (OUTPATIENT)
Dept: FAMILY MEDICINE CLINIC | Facility: CLINIC | Age: 34
End: 2024-12-12

## 2024-12-12 DIAGNOSIS — E66.01 MORBID OBESITY (HCC): ICD-10-CM

## 2024-12-12 DIAGNOSIS — R06.81 APNEA: Primary | ICD-10-CM

## 2024-12-12 DIAGNOSIS — G47.09 OTHER INSOMNIA: ICD-10-CM

## 2024-12-16 ENCOUNTER — TELEPHONE (OUTPATIENT)
Dept: SLEEP CENTER | Facility: CLINIC | Age: 34
End: 2024-12-16

## 2024-12-16 NOTE — TELEPHONE ENCOUNTER
Referral placed for a home sleep study.  Notes do not reflect the need for sleep study. Patient will need to be evaluated with a face to face consultation.      Please place new referral for a sleep consultation.      Ordering and co-signing providers notified.

## 2024-12-16 NOTE — TELEPHONE ENCOUNTER
Please notify patient that she needs a face to face visit, which can be virtual, in order for me to order sleep study.

## 2024-12-18 ENCOUNTER — PATIENT MESSAGE (OUTPATIENT)
Dept: FAMILY MEDICINE CLINIC | Facility: CLINIC | Age: 34
End: 2024-12-18

## 2024-12-18 DIAGNOSIS — N32.89 BLADDER SPASM: Primary | ICD-10-CM

## 2024-12-19 NOTE — PATIENT COMMUNICATION
Uro consult placed as she was last seen by Uro in 2020.      She needs an appt for evaluation.  I do not have any appts until Mon 12/23/24.  She should be seen at Urgent Care to R/O UTI in in the interim.  If negative for UTI, would recommend appt with me next week to evaluate for bladder spasms while awaiting Uro appt.

## 2025-02-22 DIAGNOSIS — F33.0 MILD EPISODE OF RECURRENT MAJOR DEPRESSIVE DISORDER (HCC): ICD-10-CM

## 2025-03-06 NOTE — PROGRESS NOTES
Assessment/Plan:  Problem List Items Addressed This Visit        Other    Class 1 obesity without serious comorbidity with body mass index (BMI) of 33 0 to 33 9 in adult     Worsening  Recommend lifestyle modifications  Anxiety - Primary     Worsening  D/C Lexapro 20mg QD  Start Cymbalta 60mg QD  Patient encouraged to resume counseling  Smart phone abel list given  Relevant Medications    DULoxetine (CYMBALTA) 30 mg delayed release capsule    DULoxetine (CYMBALTA) 60 mg delayed release capsule    Episode of recurrent major depressive disorder ([facility] [address] )     See above  Relevant Medications    DULoxetine (CYMBALTA) 30 mg delayed release capsule    DULoxetine (CYMBALTA) 60 mg delayed release capsule    Vitamin D deficiency     Vitamin D Deficiency - Recommend start multivitamin and prescription vitamin D (Drisdol)  When 12 weeks of Drisdol completed, continue multivitamin and start over-the-counter Vitamin D3 1,000-3,000 International Units daily  Check vitamin D level 1 week afterwards completing Drisdol           Relevant Orders    Vitamin D 25 hydroxy      Other Visit Diagnoses     Insomnia, unspecified type      See above  Handout given  Return in about 6 weeks (around 10/25/2019) for  Recheck Anx/Dep, Adjust D/O, Obesity, Labs         Future Appointments   Date Time Provider [place]   10/25/2019  8:00 AM Romulo Cervantes DO [facility]        Subjective:     Ian Trevino is a 34 y o  female who presents today for a follow-up on her chronic medical conditions  HPI:  Chief Complaint   Patient presents with    Follow-up     6 weeks f/u     Anxiety     has concerns with anxiety    Insomnia     has been having trouble sleeping for about 3 weeks  -- Above per clinical staff and reviewed  --      HPI      Today:    Return in about 6 weeks (around 7/16/2019) for Recheck Anx/Dep, Adjust D/O, Obesity, Labs  Did not complete labs        Obesity - Trying to watch diet - eating smaller portions  +Regular Exercise - Walking dogs 30 minutes, 7 days per week  Walking 3-4 miles at job        Vitamin D Deficiency - s/p Drisdol  Not taking MVI or OTC Vitamin D         Adjustment Disorder - On Lexapro 20mg QD x 6 weeks  D/C Zoloft 50mg QD x 3 weeks due to worsening anxiety  Last saw counselor , which was helpful  She plans to schedule an appointment in the future  She is less quick to anger, less easily annoyed, has less unprovoked crying  Improved motivation  Feels that mood is 40% improved, but feels that anxiety is still uncontrolled  She is having anxiety attacks daily over things that she thinks are not important  She has anxiety attack prior to work despite liking her job and co-workers  Anxiety attacks last a couple to 20-30 minutes  Breathing exercises have been helpful  Symptoms since  since birth of twin sons  No other mood meds or counseling previously  Good social supports  No SI/HI/AH/VH       Insomnia - Symptoms x 3 weeks  Can fall asleep easily, but awakens after 1 5-2 hours of sleep  Sleeping for 6 hours interrupted  Unsure of trigger  Drinks 8-10oz caffeine daily  Denies snoring or witnessed apnea  PHQ-9 Depression Screening    PHQ-9:    Frequency of the following problems over the past two weeks:       Little interest or pleasure in doing things:  1 - several days  Feeling down, depressed, or hopeless:  1 - several days  Trouble falling or staying asleep, or sleeping too much:  3 - nearly every day  Feeling tired or having little energy:  1 - several days  Poor appetite or overeatin - several days  Feeling bad about yourself - or that you are a failure or have let yourself or your family down:  1 - several days  Trouble concentrating on things, such as reading the newspaper or watching television:  1 - several days  Moving or speaking so slowly that other people could have noticed   Or the opposite - being so fidgety or restless that you have been moving around a lot more than usual:  1 - several days  Thoughts that you would be better off dead, or of hurting yourself in some way:  1 - several days  PHQ-2 Score:  2  PHQ-9 Score:  11       BLADIMIR-7 Flowsheet Screening      Most Recent Value   Over the last two weeks, how often have you been bothered by the following problems? Feeling nervous, anxious, or on edge  2   Not being able to stop or control worrying  1   Worrying too much about different things  1   Trouble relaxing   1   Being so restless that it's hard to sit still  2   Becoming easily annoyed or irritable   1   Feeling afraid as if something awful might happen  1   How difficult have these problems made it for you to do your work, take care of things at home, or get along with other people? Somewhat difficult   BLADIMIR Score   9        MDQ:  3    From previous note:    Obesity - Trying to watch diet  +Regular Exercise - Walking dogs 30 minutes, 7 days per week        Migraines - Occurs less often - 1-2 times per month, once weekly when younger  Has had migraines since 14yo  Using Aleve / Ibuprofen / Tylenol PRN, rest, helpful  B/L Ethmoid pain - feels like eye strain, can progress to achy, throbbing sensation  Currently 0/10, Max 5-6/10  Lasts 30-45 minutes to 2-3 hours  Denies aura  Sometimes has associated nausea  Denies photophobia and phonophobia  No migraine Rx previously          Adjustment Disorder - Symptoms since 2013 since birth of twin sons  "I'm all over the place mood-wise "  She feels she is quick to anger, easily annoyed, has unprovoked crying  No mood meds or counseling previously  Good social supports  Denies SI  Had thoughts about leaving a few days ago, but does not have a plan  Would drive until she runs out of gas  No HI/AH/VH  Lost her job early 3/19 - no unemployement, has been applying to jobs s success   is also stress by finances    She is at home with her kids, laying on the couch, low motivation        PHQ-9 Depression Screening    PHQ-9:    Frequency of the following problems over the past two weeks:       Little interest or pleasure in doing things:  2 - more than half the days  Feeling down, depressed, or hopeless:  2 - more than half the days  Trouble falling or staying asleep, or sleeping too much:  3 - nearly every day  Feeling tired or having little energy:  2 - more than half the days  Poor appetite or overeating:  3 - nearly every day  Feeling bad about yourself - or that you are a failure or have let yourself or your family down:  3 - nearly every day  Trouble concentrating on things, such as reading the newspaper or watching television:  1 - several days  Moving or speaking so slowly that other people could have noticed  Or the opposite - being so fidgety or restless that you have been moving around a lot more than usual:  0 - not at all  Thoughts that you would be better off dead, or of hurting yourself in some way:  1 - several days  PHQ-2 Score:  4  PHQ-9 Score:  17             BLADIMIR-7 Flowsheet Screening      Most Recent Value   Over the last two weeks, how often have you been bothered by the following problems? Feeling nervous, anxious, or on edge  1   Not being able to stop or control worrying  3   Worrying too much about different things  3   Trouble relaxing   2   Being so restless that it's hard to sit still  1   Becoming easily annoyed or irritable   1   Feeling afraid as if something awful might happen  1   How difficult have these problems made it for you to do your work, take care of things at home, or get along with other people? Very difficult   BLADIMIR Score   12                Reviewed:  Labs 6/4/19,  Gyn 9/6/17     Sees [facility]  Next appt 9/18 - overdue    Unsure of Tdap status  [address] Shereen Lora delivered her children in 2013          The following portions of the patient's history were reviewed and updated as appropriate: allergies, current medications, past family history, past medical history, past social history, past surgical history and problem list       Review of Systems   Constitutional: Negative for appetite change, chills, diaphoresis, fatigue and fever  Respiratory: Negative for chest tightness and shortness of breath  Cardiovascular: Negative for chest pain  Gastrointestinal: Negative for abdominal pain, diarrhea, nausea and vomiting  Genitourinary: Negative for dysuria  Current Outpatient Medications   Medication Sig Dispense Refill    DULoxetine (CYMBALTA) 30 mg delayed release capsule Take 1 capsule (30 mg total) by mouth daily 7 capsule 0    DULoxetine (CYMBALTA) 60 mg delayed release capsule Take 1 capsule (60 mg total) by mouth daily Start after taking 30mg daily x 1 week  30 capsule 1     No current facility-administered medications for this visit  Objective:  /76   Pulse 78   Temp 98 9 °F (37 2 °C) (Tympanic)   Resp 16   Ht 5' 1 02" (1 55 m)   Wt 80 kg (176 lb 6 4 oz)   SpO2 100%   BMI 33 30 kg/m²    Wt Readings from Last 3 Encounters:   09/13/19 80 kg (176 lb 6 4 oz)   06/04/19 78 5 kg (173 lb)   05/01/17 71 5 kg (157 lb 9 6 oz)      BP Readings from Last 3 Encounters:   09/13/19 118/76   06/04/19 124/78   05/01/17 112/57          Physical Exam   Constitutional: She is oriented to person, place, and time  She appears well-developed and well-nourished  HENT:   Head: Normocephalic and atraumatic  Eyes: Conjunctivae are normal    Neck: Neck supple  Cardiovascular: Normal rate, regular rhythm, normal heart sounds and intact distal pulses  Pulmonary/Chest: Effort normal and breath sounds normal    Musculoskeletal: She exhibits no edema or tenderness  Neurological: She is alert and oriented to person, place, and time  Psychiatric: She has a normal mood and affect   Her behavior is normal  Judgment and thought content normal    Nursing note and vitals reviewed  Lab Results:      Lab Results   Component Value Date    WBC 9 01 06/04/2019    HGB 12 2 06/04/2019    HCT 38 5 06/04/2019     06/04/2019    TRIG 77 06/04/2019    HDL 48 06/04/2019    ALT 21 06/04/2019    AST 13 06/04/2019    K 3 8 06/04/2019     06/04/2019    CREATININE 0 84 06/04/2019    BUN 12 06/04/2019    CO2 24 06/04/2019    GLUF 90 06/04/2019    HGBA1C 5 3 06/04/2019     No results found for: URICACID  Invalid input(s): BASENAME Vitamin D    No results found       POCT Labs [FreeTextEntry2] :  r knee pain

## 2025-04-02 DIAGNOSIS — R11.2 NAUSEA AND VOMITING, UNSPECIFIED VOMITING TYPE: Primary | ICD-10-CM

## 2025-04-02 RX ORDER — FAMOTIDINE 40 MG/1
40 TABLET, FILM COATED ORAL
Qty: 30 TABLET | Refills: 3 | Status: SHIPPED | OUTPATIENT
Start: 2025-04-02

## 2025-04-08 ENCOUNTER — RA CDI HCC (OUTPATIENT)
Dept: OTHER | Facility: HOSPITAL | Age: 35
End: 2025-04-08

## 2025-05-31 ENCOUNTER — APPOINTMENT (OUTPATIENT)
Dept: LAB | Facility: CLINIC | Age: 35
End: 2025-05-31
Attending: FAMILY MEDICINE
Payer: COMMERCIAL

## 2025-06-02 ENCOUNTER — PATIENT MESSAGE (OUTPATIENT)
Dept: FAMILY MEDICINE CLINIC | Facility: CLINIC | Age: 35
End: 2025-06-02

## 2025-06-02 ENCOUNTER — OFFICE VISIT (OUTPATIENT)
Dept: FAMILY MEDICINE CLINIC | Facility: CLINIC | Age: 35
End: 2025-06-02
Payer: COMMERCIAL

## 2025-06-02 ENCOUNTER — RESULTS FOLLOW-UP (OUTPATIENT)
Dept: FAMILY MEDICINE CLINIC | Facility: CLINIC | Age: 35
End: 2025-06-02

## 2025-06-02 VITALS
HEIGHT: 61 IN | SYSTOLIC BLOOD PRESSURE: 122 MMHG | DIASTOLIC BLOOD PRESSURE: 74 MMHG | BODY MASS INDEX: 44.71 KG/M2 | WEIGHT: 236.8 LBS | HEART RATE: 77 BPM | TEMPERATURE: 97.2 F | OXYGEN SATURATION: 99 %

## 2025-06-02 DIAGNOSIS — F33.0 MILD EPISODE OF RECURRENT MAJOR DEPRESSIVE DISORDER (HCC): ICD-10-CM

## 2025-06-02 DIAGNOSIS — E66.01 MORBID OBESITY (HCC): ICD-10-CM

## 2025-06-02 DIAGNOSIS — E78.5 HYPERLIPIDEMIA, UNSPECIFIED HYPERLIPIDEMIA TYPE: ICD-10-CM

## 2025-06-02 DIAGNOSIS — Z00.00 ANNUAL PHYSICAL EXAM: Primary | ICD-10-CM

## 2025-06-02 DIAGNOSIS — F41.9 ANXIETY: ICD-10-CM

## 2025-06-02 DIAGNOSIS — E66.01 MORBID OBESITY WITH BMI OF 45.0-49.9, ADULT (HCC): ICD-10-CM

## 2025-06-02 DIAGNOSIS — R73.01 IFG (IMPAIRED FASTING GLUCOSE): ICD-10-CM

## 2025-06-02 DIAGNOSIS — F33.1 MODERATE EPISODE OF RECURRENT MAJOR DEPRESSIVE DISORDER (HCC): ICD-10-CM

## 2025-06-02 DIAGNOSIS — G43.909 MIGRAINE WITHOUT STATUS MIGRAINOSUS, NOT INTRACTABLE, UNSPECIFIED MIGRAINE TYPE: ICD-10-CM

## 2025-06-02 DIAGNOSIS — G47.09 OTHER INSOMNIA: ICD-10-CM

## 2025-06-02 DIAGNOSIS — E55.9 VITAMIN D DEFICIENCY: ICD-10-CM

## 2025-06-02 DIAGNOSIS — K21.9 GASTROESOPHAGEAL REFLUX DISEASE, UNSPECIFIED WHETHER ESOPHAGITIS PRESENT: ICD-10-CM

## 2025-06-02 PROCEDURE — 99395 PREV VISIT EST AGE 18-39: CPT | Performed by: FAMILY MEDICINE

## 2025-06-02 PROCEDURE — 99214 OFFICE O/P EST MOD 30 MIN: CPT | Performed by: FAMILY MEDICINE

## 2025-06-02 RX ORDER — BUSPIRONE HYDROCHLORIDE 15 MG/1
TABLET ORAL
Qty: 90 TABLET | Refills: 1 | Status: SHIPPED | OUTPATIENT
Start: 2025-06-02

## 2025-06-02 RX ORDER — TRAZODONE HYDROCHLORIDE 50 MG/1
50 TABLET ORAL
Qty: 90 TABLET | Refills: 2 | Status: SHIPPED | OUTPATIENT
Start: 2025-06-02

## 2025-06-02 NOTE — ASSESSMENT & PLAN NOTE
Depression Screening Follow-up Plan: Patient's depression screening was positive with a PHQ-9 score of 9.     Depression Screening Follow-up Plan: Patient's depression screening was positive with a PHQ-9 score of 12. Patient advised to follow-up with PCP for further management.    Unstable.  Increase Buspar 15mg 1.5 tabs BID.  Continue Trintellix 20mg QD.   Counseling advised.     Orders:    busPIRone (BUSPAR) 15 mg tablet; 1.5 pill in AM and 1 pill in PM x 1 week, then 1.5 pills twice daily.    Comprehensive metabolic panel; Future    Magnesium; Future    TSH, 3rd generation with Free T4 reflex; Future

## 2025-06-02 NOTE — ASSESSMENT & PLAN NOTE
Management per GI.  Continue Omeprazole 20mg BID.  S/p EGD.  Watch GERD diet.      Orders:    Ambulatory Referral to Nutrition Services; Future    Magnesium; Future

## 2025-06-02 NOTE — ASSESSMENT & PLAN NOTE
Stable s statin. Recommend lifestyle modifications.       Orders:    Ambulatory Referral to Nutrition Services; Future    Comprehensive metabolic panel; Future    Lipid panel; Future    TSH, 3rd generation with Free T4 reflex; Future    LDL cholesterol, direct; Future

## 2025-06-02 NOTE — PATIENT INSTRUCTIONS
"Call Insurance to Ask About Morbid Obesity, Prediabetes, Hyperlipidemia, Depression, GERD Med Coverage -      GLP-1 Agonists - Injectable - Zepbound, Wegovy, Saxenda        Please let us know about your medication selection and availability at pharmacy of your choice.            Low vitamin D - Recommend start multivitamin and over-the-counter vitamin D3 3000 International Units daily.      Patient Education     Routine physical for adults   The Basics   Written by the doctors and editors at [facility]   What is a physical? -- A physical is a routine visit, or \"check-up,\" with your doctor. You might also hear it called a \"wellness visit\" or \"preventive visit.\"  During each visit, the doctor will:   Ask about your physical and mental health   Ask about your habits, behaviors, and lifestyle   Do an exam   Give you vaccines if needed   Talk to you about any medicines you take   Give advice about your health   Answer your questions  Getting regular check-ups is an important part of taking care of your health. It can help your doctor find and treat any problems you have. But it's also important for preventing health problems.  A routine physical is different from a \"sick visit.\" A sick visit is when you see a doctor because of a health concern or problem. Since physicals are scheduled ahead of time, you can think about what you want to ask the doctor.  How often should I get a physical? -- It depends on your age and health. In general, for people age 21 years and older:   If you are younger than 50 years, you might be able to get a physical every 3 years.   If you are 50 years or older, your doctor might recommend a physical every year.  If you have an ongoing health condition, like diabetes or high blood pressure, your doctor will probably want to see you more often.  What happens during a physical? -- In general, each visit will include:   Physical exam - The doctor or nurse will check your height, weight, heart rate, " "and blood pressure. They will also look at your eyes and ears. They will ask about how you are feeling and whether you have any symptoms that bother you.   Medicines - It's a good idea to bring a list of all the medicines you take to each doctor visit. Your doctor will talk to you about your medicines and answer any questions. Tell them if you are having any side effects that bother you. You should also tell them if you are having trouble paying for any of your medicines.   Habits and behaviors - This includes:   Your diet   Your exercise habits   Whether you smoke, drink alcohol, or use drugs   Whether you are sexually active   Whether you feel safe at home  Your doctor will talk to you about things you can do to improve your health and lower your risk of health problems. They will also offer help and support. For example, if you want to quit smoking, they can give you advice and might prescribe medicines. If you want to improve your diet or get more physical activity, they can help you with this, too.   Lab tests, if needed - The tests you get will depend on your age and situation. For example, your doctor might want to check your:   Cholesterol   Blood sugar   Iron level   Vaccines - The recommended vaccines will depend on your age, health, and what vaccines you already had. Vaccines are very important because they can prevent certain serious or deadly infections.   Discussion of screening - \"Screening\" means checking for diseases or other health problems before they cause symptoms. Your doctor can recommend screening based on your age, risk, and preferences. This might include tests to check for:   Cancer, such as breast, prostate, cervical, ovarian, colorectal, prostate, lung, or skin cancer   Sexually transmitted infections, such as chlamydia and gonorrhea   Mental health conditions like depression and anxiety  Your doctor will talk to you about the different types of screening tests. They can help you decide " which screenings to have. They can also explain what the results might mean.   Answering questions - The physical is a good time to ask the doctor or nurse questions about your health. If needed, they can refer you to other doctors or specialists, too.  Adults older than 65 years often need other care, too. As you get older, your doctor will talk to you about:   How to prevent falling at home   Hearing or vision tests   Memory testing   How to take your medicines safely   Making sure that you have the help and support you need at home  All topics are updated as new evidence becomes available and our peer review process is complete.  This topic retrieved from [facility] on: May 02, 2024.  Topic 277918 Version 1.0  Release: 32.4.3 - C32.122  © 2024 UpToDate, Inc. and/or its affiliates. All rights reserved.  Consumer Information Use and Disclaimer   Disclaimer: This generalized information is a limited summary of diagnosis, treatment, and/or medication information. It is not meant to be comprehensive and should be used as a tool to help the user understand and/or assess potential diagnostic and treatment options. It does NOT include all information about conditions, treatments, medications, side effects, or risks that may apply to a specific patient. It is not intended to be medical advice or a substitute for the medical advice, diagnosis, or treatment of a health care provider based on the health care provider's examination and assessment of a patient's specific and unique circumstances. Patients must speak with a health care provider for complete information about their health, medical questions, and treatment options, including any risks or benefits regarding use of medications. This information does not endorse any treatments or medications as safe, effective, or approved for treating a specific patient. UpToDate, Inc. and its affiliates disclaim any warranty or liability relating to this information or the use  "thereof.The use of this information is governed by the Terms of Use, available at https://www.wolterskluwer.com/en/know/clinical-effectiveness-terms. 2024© UpToDate, Inc. and its affiliates and/or licensors. All rights reserved.  Copyright   © 2024 UpToDate, Inc. and/or its affiliates. All rights reserved.    Patient Education     High cholesterol   The Basics   Written by the doctors and editors at [employer]   What is cholesterol? -- Cholesterol is a substance found in blood. Everyone has some. It is needed for good health. But people sometimes have too much cholesterol.  Compared with people with normal cholesterol, people with high cholesterol have a higher risk of heart attack, stroke, and other health problems. The higher your cholesterol, the higher your risk of these problems.  Are there different types of cholesterol? -- Yes, there are a few different types. If you get a cholesterol test, you might hear your doctor or nurse talk about:   Total cholesterol   LDL cholesterol - Some people call this the \"bad\" cholesterol. That's because having high LDL levels raises your risk of heart attack, stroke, and other health problems.   HDL cholesterol - Some people call this the \"good\" cholesterol. That's because people with high HDL levels tend to have a lower risk of heart attack, stroke, and other health problems.   Non-HDL cholesterol - Non-HDL cholesterol is your total cholesterol minus your HDL cholesterol.   Triglycerides - Triglycerides are not cholesterol. They are another type of fat. But they often get measured when cholesterol is measured. (Having high triglycerides also seems to increase the risk of heart attack and stroke.)  What should my numbers be? -- Ask your doctor or nurse what your numbers should be. Different people need different goals. If you live outside of the [place], see the table (table 1).  In general, people who do not already have heart disease should aim for:   Total cholesterol below 200   " "LDL cholesterol below 130, or much lower if they are at risk of heart attack or stroke   HDL cholesterol above 60   Non-HDL cholesterol below 160, or lower if they are at risk of heart attack or stroke   Triglycerides below 150  Remember, though, that many people who cannot meet these goals still have a low risk of heart attack and stroke.  What should I do if I have high cholesterol? -- Ask your doctor what your overall risk of heart attack and stroke is. Just having high cholesterol is not always a reason to worry. Having high cholesterol is just one of many things that can increase your risk of heart attack and stroke.  Other things that increase your risk include:   Smoking   High blood pressure   Having a parent or sibling who got heart disease at a young age - Young, in this case, means younger than 55 for males and younger than 65 for females.   A diet that is not heart healthy - A \"heart-healthy\" diet includes lots of fruits and vegetables, fiber, and healthy fats (like those found in fish, nuts, and certain oils). It also means limiting sugar and unhealthy fats.   Older age  If you are at high risk of heart attack and stroke, having high cholesterol is a problem. But if you are at low risk, high cholesterol might not need treatment.  Should I take medicine to lower cholesterol? -- Not everyone who has high cholesterol needs medicines. Your doctor or nurse will decide if you need them based on your age, family history, and other health concerns.  There are many different medicines used to lower cholesterol (table 2). Some help your body make less cholesterol. Some keep your body from absorbing cholesterol from foods. Some help your body get rid of cholesterol faster. The medicines most often used to treat high cholesterol are called \"statins.\"  You should probably take a statin if you:   Already had a heart attack or stroke   Have known heart disease   Have diabetes   Have a condition called \"peripheral " "artery disease,\" which makes it painful to walk, and happens when the arteries in your legs get clogged with fatty deposits   Have an \"abdominal aortic aneurysm,\" which is a widening of the main artery in the belly  Most people with any of the conditions listed above should take a statin no matter what their cholesterol level is. If your doctor or nurse prescribes a statin, it's important to keep taking it. The medicine might not make you feel any different. But it can help prevent heart attack, stroke, and death.  If your doctor or nurse recommends taking medicine to help lower your cholesterol, make sure that you know what it is called. Follow all the instructions for how to take it. For example, some medicines work better when you take them in the evening. Some need to be taken with food.  Tell your doctor or nurse if your medicine causes any side effects that bother you. They might be able to switch you to a different medicine.  Can I lower my cholesterol without medicines? -- Yes. You can help lower your cholesterol by doing these things:   You can lower your LDL, or \"bad,\" cholesterol by avoiding red meat, butter, fried foods, cheese, and other foods that have a lot of saturated fat.   You can lower triglycerides by avoiding sugary foods, fried foods, and excess alcohol.   If you have excess weight, it can help to lose weight. Your doctor or nurse can help you do this in a healthy way.   Try to get regular physical activity. Even gentle forms of exercise, like walking, are good for your health.  Even if these steps don't change your cholesterol very much, they can improve your health in many other ways.  All topics are updated as new evidence becomes available and our peer review process is complete.  This topic retrieved from [employer] on: Mar 01, 2024.  Topic 25416 Version 25.0  Release: 32.2.4 - C32.59  © 2024 UpToDate, Inc. and/or its affiliates. All rights reserved.  table 1: Cholesterol and triglyceride " measurements in the [place] and elsewhere     Measurement used within the [place] Milligrams/deciliter (mg/dL)  Measurement used most places outside of the [place] Millimoles/liter (mmol/Liter)     Level to aim for  Level to aim for    Total cholesterol  Below 200 Below 5.17   LDL cholesterol  Below 130, or much lower if at risk of heart attack and stroke Below 3.36, or much lower if at risk of heart attack and stroke   HDL cholesterol  Above 60 Above 1.55   Triglycerides  Below 150 Below 1.7   Cholesterol is measured differently in the [place] than it is in most other countries. This table shows values used within and outside of the [place]. It includes the cholesterol and triglyceride levels that most people who do not have heart disease should aim for.  Graphic 67690 Version 5.0  table 2: Lipid-lowering medicines  Generic name  Brand name    Statins    Atorvastatin Lipitor   Fluvastatin Lescol, Lescol XL   Lovastatin Mevacor, Altoprev   Pitavastatin Livalo   Pravastatin Pravachol   Rosuvastatin Crestor   Simvastatin Zocor   PCSK9 inhibitors    Alirocumab Praluent   Evolocumab Repatha, Repatha SureClick   Cholesterol absorption inhibitors    Ezetimibe Zetia   Bile acid sequestrants    Cholestyramine Prevalite, Questran, Questran Light   Colesevelam Welchol   Colestipol Colestid   Niacin (nicotinic acid)    Niacin immediate release     Niacin extended release Niaspan   Fibrates    Fenofibrate Fenoglide, Tricor, Triglide, others   Gemfibrozil Lopid   Brand names listed are for medicines available in the [place] and some other countries.  Graphic 64407 Version 7.0  Consumer Information Use and Disclaimer   Disclaimer: This generalized information is a limited summary of diagnosis, treatment, and/or medication information. It is not meant to be comprehensive and should be used as a tool to help the user understand and/or assess potential diagnostic and treatment options. It does NOT include all information about conditions, treatments,  medications, side effects, or risks that may apply to a specific patient. It is not intended to be medical advice or a substitute for the medical advice, diagnosis, or treatment of a health care provider based on the health care provider's examination and assessment of a patient's specific and unique circumstances. Patients must speak with a health care provider for complete information about their health, medical questions, and treatment options, including any risks or benefits regarding use of medications. This information does not endorse any treatments or medications as safe, effective, or approved for treating a specific patient. UpToDate, Inc. and its affiliates disclaim any warranty or liability relating to this information or the use thereof.The use of this information is governed by the Terms of Use, available at https://www.woltersChroma Energyuwer.com/en/know/clinical-effectiveness-terms. 2024© UpToDate, Inc. and its affiliates and/or licensors. All rights reserved.  Copyright   © 2024 UpToDate, Inc. and/or its affiliates. All rights reserved.

## 2025-06-02 NOTE — PROGRESS NOTES
Assessment/Plan:  Assessment & Plan  Annual physical exam  Health Maintenance   Topic Date Due    HPV Vaccine (1 - 3-dose series) Never done    COVID-19 Vaccine (4 - 2024-25 season) 09/02/2025 (Originally 9/1/2024)    Depression Screening  06/02/2026    Annual Physical  06/02/2026    Depression Follow-up Plan  06/02/2026    DTaP,Tdap,and Td Vaccines (3 - Td or Tdap) 03/22/2034    Zoster Vaccine (1 of 2) 11/14/2042    HIV Screening  Completed    Hepatitis C Screening  Completed    Influenza Vaccine  Completed    Meningococcal B Vaccine  Aged Out    RSV Vaccine age 0-20 Months  Aged Out    Pneumococcal Vaccine: Pediatrics (0 to 5 Years) and At-Risk Patients (6 to 64 Years)  Aged Out    HIB Vaccine  Aged Out    IPV Vaccine  Aged Out    Hepatitis A Vaccine  Aged Out    Meningococcal ACWY Vaccine  Aged Out            IFG (impaired fasting glucose)  New.  Recommend lifestyle modifications.    Orders:    Ambulatory Referral to Nutrition Services; Future    Comprehensive metabolic panel; Future    Hemoglobin A1C; Future      Call Insurance to Ask About Morbid Obesity, Prediabetes, Hyperlipidemia, Depression, GERD Med Coverage -      GLP-1 Agonists - Injectable - Zepbound, Wegovy, Saxenda        Please let us know about your medication selection and availability at pharmacy of your choice.      Hyperlipidemia, unspecified hyperlipidemia type    Stable s statin. Recommend lifestyle modifications.       Orders:    Ambulatory Referral to Nutrition Services; Future    Comprehensive metabolic panel; Future    Lipid panel; Future    TSH, 3rd generation with Free T4 reflex; Future    LDL cholesterol, direct; Future     Anxiety      Unstable.  Increase Buspar 15mg 1.5 tabs BID.  Continue Trintellix 20mg QD.   Counseling advised.       Orders:    busPIRone (BUSPAR) 15 mg tablet; 1.5 pill in AM and 1 pill in PM x 1 week, then 1.5 pills twice daily.    Comprehensive metabolic panel; Future    Magnesium; Future    Comprehensive  metabolic panel; Future    TSH, 3rd generation with Free T4 reflex; Future    Magnesium; Future     Moderate episode of recurrent major depressive disorder (HCC)  Depression Screening Follow-up Plan: Patient's depression screening was positive with a PHQ-9 score of 9.     Depression Screening Follow-up Plan: Patient's depression screening was positive with a PHQ-9 score of 12. Patient advised to follow-up with PCP for further management.    Unstable.  Increase Buspar 15mg 1.5 tabs BID.  Continue Trintellix 20mg QD.   Counseling advised.     Orders:    busPIRone (BUSPAR) 15 mg tablet; 1.5 pill in AM and 1 pill in PM x 1 week, then 1.5 pills twice daily.    Comprehensive metabolic panel; Future    Magnesium; Future    TSH, 3rd generation with Free T4 reflex; Future    Vitamin D deficiency    Stable. Continue MVI.       Orders:    Ambulatory Referral to Nutrition Services; Future    Comprehensive metabolic panel; Future    Vitamin D 25 hydroxy; Future     Other insomnia    Stable on Trazodone 50 mg nightly as needed.  To consider increase Trazodone 75mg QHS PRN in future?      Orders:    traZODone (DESYREL) 50 mg tablet; Take 1 tablet (50 mg total) by mouth daily at bedtime as needed for sleep    TSH, 3rd generation with Free T4 reflex; Future     Migraine without status migrainosus, not intractable, unspecified migraine type      Stable s Rx.       For headache and migraine prevention I would suggest a combination vitamin supplement which may include 1 or more of the following ingredients:  Magnesium 400 mg , Riboflavin (vitamin B2) 400 - 600 mg,  Butterbur 150 mg (PA free). Other ingredients that may be helpful are feverfew, coenzyme Q10 100 mg three times a day,  melatonin and ginger.  Some example brands that combine some of these ingredients are Migravent or Dolovent.      Orders:    Magnesium; Future    Magnesium; Future     Gastroesophageal reflux disease, unspecified whether esophagitis present    Management  per GI.  Continue Omeprazole 20mg BID.  S/p EGD.  Watch GERD diet.      Orders:    Ambulatory Referral to Nutrition Services; Future    Magnesium; Future     Morbid obesity (HCC)      Worsening. Recommend lifestyle modifications. To consider GLP1-A in future PRN?       Orders:    Ambulatory Referral to Nutrition Services; Future    TSH, 3rd generation with Free T4 reflex; Future    Vitamin D 25 hydroxy; Future     Morbid obesity with BMI of 45.0-49.9, adult (HCC)  Prior Authorization Clinical Questions for Weight Management Pharmacotherapy    2. Does the patient have a diagnosis of obesity, confirmed by a BMI greater than or equal to 30 kg/m^2?: Yes  3. Does the patient have a BMI of greater than or equal to 27 kg/m^2 with at least one weight-related comorbidity/risk factor/complication (e.g. diabetes, dyslipidemia, coronary artery disease)?: Yes  4. Weight-related co-morbidities/risk factors: prediabetes, dyslipidemia, GERD, depression  7. Has the patient been on a weight loss regimen of low-calorie diet, increased physical activity, and lifestyle modifications for a minimum of 6 months?: Yes  8. Has the patient completed a comprehensive weight loss program (ie, Weight Watchers, Noom, Bariatrics, other abel on phone)? If so, what?: No  9. Does the patient have a history of type 2 diabetes?: No  10. Has the member tried and failed other weight loss medication within the past 12 months?: No  11. Will the member use requested medication in combination with another GLP agonist or weight loss drug?: No  12. Is the medication a controlled substance?: No     Baseline weight (in pounds): 236.8 lbs  Current weight (in pounds): 236.8 lbs  Weight loss percentage: 0%         Orders:    Ambulatory Referral to Nutrition Services; Future    TSH, 3rd generation with Free T4 reflex; Future    Vitamin D 25 hydroxy; Future    Mild episode of recurrent major depressive disorder (HCC)  Depression Screening Follow-up Plan: Patient's  depression screening was positive with a PHQ-9 score of 9.     Unstable.  Increase Buspar 15mg 1.5 tabs BID.  Continue Trintellix 20mg QD.   Counseling advised.     Orders:    Vortioxetine HBr (Trintellix) 20 MG tablet; Take 1 tablet (20 mg total) by mouth daily           Return in about 6 weeks (around 7/14/2025) for F/U Anx/Dep, Insomnia, Labs; 10/2/25 4mo - IFG, HL, Anx/Dep, Insomnia, Labs; PRN 6wk F/U GLP1A.      Future Appointments   Date Time Provider Department Center   7/21/2025  9:30 AM Eloise Hurley,  [facility]   10/2/2025  8:00 AM Eloise Hurley DO [facility]          Subjective:     Cheryl is a 34 y.o. female who presents today for a follow-up on her chronic medical conditions.        HPI:  Chief Complaint   Patient presents with    Physical Exam     Pt would like to discuss meds.     -- Above per clinical staff and reviewed. --      HPI      Today:      Return in about 4 months (around 4/9/2025) for Physical / 4mo - HL, Anx/Dep, Insomnia, GERD, Vit D Def, Labs.       4mo OV - Overdue    Morbid Obesity - Watching diet. - eating smaller portions, less junk food, no longer drinking soda since late 2/21, drinking more water, less salt. +Exercise - Walking 15-20 minutes, 3-4 days per week.      IFG - No Pre-DM meds previously.       Hyperlipidemia - No statin previously.        GERD / Hiatal Hernia - Management per GI Dr. Portillo - Next appt PRN.  On Omeprazole 20mg BID.  She did not yet start Pepcid 40mg QHS. Watching GERD diet, but has symptoms with drinking water.  GERD sometimes causes insomnia.  s/p EGD 11/12/24.         Anxiety / Depression - Mood has decreased since 5/25 due to work stressors, which will not be changing.  On increased Buspar 15mg BID.  Feels mood improed 60-70% improved.  Still feels very anxious.  On Trintellix 20mg QD, using Atarax 25mg 1 pill BID on work days only.  .  No longer has leg shaking.  She no longer has unprovoked crying or mental meltdowns.   Denies trigger.  Has work stress.  She never started Viibryd 20mg QD as she was not focused on her health.  Previously on increased Buspar 15mg BID.  Feels anxiety is stable.  Denies trigger.  No longer has anxiety attacks.  D/C Effexor XR 225mg QD due to worsening depression.        D/C Cymbalta 60mg QD as anxiety improved, but depression is worsened.  D/C Lexapro 20mg QD due to worsening anxiety.  D/C Zoloft 50mg QD x 3 weeks due to worsening anxiety.  Was seeing counselor monthly prior COVID-19 - last appt , which was helpful, but counselor relocated.  She was on a waiting list at 2 counseling offices as of 21, but both were not a good fit.  Her work schedule has been busy and she has not been able to reconnect c counseling as of 10/29/21.   She has a good relationship c her ex-.  Breathing exercises have been helpful.  Symptoms since  since birth of twin sons. No other mood meds.  Good social supports.  No SI/HI/AH/VH.      Insomnia - On Trazodone 50mg QHS.  On increased Buspar 15mg BID.  On Trintellix 20mg QD - unchanged.  Takes more than minutes (previously 30-40) hours to fall asleep, awake 2-3 (previously 1) times per night.  Sleeps 4.5-5 (previously 6-7) hours nightly and feels well rested in AM.  Previously on Trazodone 100mg QHS PRN - taking 1 pill QHS, she does not recall if helpful, but made her tired in AM.   Unsure of trigger.   She does not drink caffeine daily.  Denies snoring or witnessed apnea.  Melantonin 10mg QHS x 3 weeks was unhelpful for falling asleep and staying asleep.              PHQ-2/9 Depression Screening    Little interest or pleasure in doing things: 2 - more than half the days  Feeling down, depressed, or hopeless: 1 - several days  Trouble falling or staying asleep, or sleeping too much: 3 - nearly every day  Feeling tired or having little energy: 2 - more than half the days  Poor appetite or overeatin - several days  Feeling bad about yourself - or that  you are a failure or have let yourself or your family down: 1 - several days  Trouble concentrating on things, such as reading the newspaper or watching television: 1 - several days  Moving or speaking so slowly that other people could have noticed. Or the opposite - being so fidgety or restless that you have been moving around a lot more than usual: 1 - several days  Thoughts that you would be better off dead, or of hurting yourself in some way: 0 - not at all  PHQ-9 Score: 12  PHQ-9 Interpretation: Moderate depression       BLADIMIR-7 Flowsheet Screening      Flowsheet Row Most Recent Value   Over the last two weeks, how often have you been bothered by the following problems?     Feeling nervous, anxious, or on edge 1   Not being able to stop or control worrying 2   Worrying too much about different things 2   Trouble relaxing  1   Being so restless that it's hard to sit still 2   Becoming easily annoyed or irritable  2   Feeling afraid as if something awful might happen 0   How difficult have these problems made it for you to do your work, take care of things at home, or get along with other people?  Somewhat difficult   BLADIMIR Score  10                  Vitamin D Deficiency - s/p Drisdol.  Taking MVI and but not OTC Vitamin D 3,000IU daily.       Migraines - Last migraine 12/20.  Occurs less often - 1-2 times per month, once weekly when younger.  Has had migraines since 12yo.  Using Aleve / Ibuprofen / Tylenol PRN, rest, helpful.  B/L Ethmoid pain - feels like eye strain, can progress to achy, throbbing sensation.  Currently 0/10, Max 5-6/10.  Lasts 30-45 minutes to 2-3 hours.  Denies aura.  Sometimes has associated nausea.  Denies photophobia and phonophobia.  No migraine Rx previously.         Pelvic Pain / Bladder Spasms - Stable.  Occurs 3 times per month..  Management per Uro Dr. Donald.  Next appt PRN.  Previously on Ditropan 5mg BID, which patient does not want to resume.  S/p stable transvaginal U/S 6/25/20  and pelvic exam per Gyn Dr. Madrigal 6/19/20.  Not taking Vistaril 25mg QHS PRN for pelvic pain per Gyn.       H/O Smoker - Quit cold turkey 3/1/21. Previously Smoking 4 cigarettes daily.  Wants to weaning/ / quit cold turkey as she has done so previously.          Reviewed:  Labs 5/31/25,  Gyn 6/19/20     Sees Gyn [facility].  Next appt 6/20 - Overdue.      Overdue for Dentist q6.  Sees Optho q2 years.        The following portions of the patient's history were reviewed and updated as appropriate: allergies, current medications, past family history, past medical history, past social history, past surgical history and problem list.      Review of Systems   Constitutional:  Positive for fatigue (Intermittent). Negative for appetite change, chills, diaphoresis and fever.   Respiratory:  Negative for chest tightness and shortness of breath.    Cardiovascular:  Negative for chest pain.   Gastrointestinal:  Positive for nausea and vomiting. Negative for abdominal pain, blood in stool and diarrhea.   Genitourinary:  Negative for dysuria.   Psychiatric/Behavioral:  Positive for sleep disturbance.         Current Outpatient Medications   Medication Sig Dispense Refill    busPIRone (BUSPAR) 15 mg tablet 1.5 pill in AM and 1 pill in PM x 1 week, then 1.5 pills twice daily. 90 tablet 1    hydrOXYzine HCL (ATARAX) 25 mg tablet Take 0.5-1 tablets (12.5-25 mg total) by mouth every 6 (six) hours as needed for anxiety 30 tablet 5    Multiple Vitamin (MULTIVITAMIN) tablet Take 1 tablet by mouth in the morning.      omeprazole (PriLOSEC) 20 mg delayed release capsule Take 1 capsule (20 mg total) by mouth 2 (two) times a day 180 capsule 3    traZODone (DESYREL) 50 mg tablet Take 1 tablet (50 mg total) by mouth daily at bedtime as needed for sleep 90 tablet 2    Vortioxetine HBr (Trintellix) 20 MG tablet Take 1 tablet (20 mg total) by mouth daily 90 tablet 2    famotidine (PEPCID) 40 MG tablet Take 1 tablet (40 mg total) by  "mouth daily at bedtime (Patient not taking: Reported on 6/2/2025) 30 tablet 3     No current facility-administered medications for this visit.       Objective:  /74 (Patient Position: Sitting, Cuff Size: Large)   Pulse 77   Temp (!) 97.2 °F (36.2 °C) (Temporal)   Ht 5' 0.75\" (1.543 m)   Wt 107 kg (236 lb 12.8 oz)   LMP 01/01/2016 (Within Years)   SpO2 99%   BMI 45.11 kg/m²    Wt Readings from Last 3 Encounters:   06/02/25 107 kg (236 lb 12.8 oz)   12/09/24 109 kg (240 lb 9.6 oz)   11/12/24 107 kg (236 lb)      BP Readings from Last 3 Encounters:   06/02/25 122/74   12/09/24 122/72   11/12/24 111/75          Physical Exam  Vitals and nursing note reviewed.   Constitutional:       Appearance: Normal appearance. She is well-developed. She is obese.   HENT:      Head: Normocephalic and atraumatic.      Right Ear: Tympanic membrane, ear canal and external ear normal.      Left Ear: Tympanic membrane, ear canal and external ear normal.      Nose: Nose normal.      Right Sinus: No maxillary sinus tenderness or frontal sinus tenderness.      Left Sinus: No maxillary sinus tenderness or frontal sinus tenderness.      Mouth/Throat:      Mouth: Mucous membranes are moist.      Pharynx: Oropharynx is clear. Uvula midline.      Tonsils: No tonsillar exudate.     Eyes:      Conjunctiva/sclera: Conjunctivae normal.      Pupils: Pupils are equal, round, and reactive to light.       Cardiovascular:      Rate and Rhythm: Normal rate and regular rhythm.      Pulses: Normal pulses.      Heart sounds: Normal heart sounds.   Pulmonary:      Effort: Pulmonary effort is normal.      Breath sounds: Normal breath sounds.   Abdominal:      General: Bowel sounds are normal. There is no distension.      Palpations: Abdomen is soft. There is no mass.      Tenderness: There is no abdominal tenderness. There is no guarding or rebound.     Musculoskeletal:         General: No swelling or tenderness.      Cervical back: Neck supple.     " " Right lower leg: No edema.      Left lower leg: No edema.   Lymphadenopathy:      Cervical: No cervical adenopathy.     Skin:     Findings: No rash.     Neurological:      General: No focal deficit present.      Mental Status: She is alert and oriented to person, place, and time. Mental status is at baseline.     Psychiatric:         Mood and Affect: Mood normal.         Behavior: Behavior normal.         Thought Content: Thought content normal.         Judgment: Judgment normal.         Lab Results:      Lab Results   Component Value Date    WBC 10.26 (H) 05/31/2025    HGB 12.7 05/31/2025    HCT 39.6 05/31/2025     05/31/2025    TRIG 210 (H) 05/31/2025    HDL 48 (L) 05/31/2025    LDLDIRECT 117 (H) 05/31/2025    ALT 50 05/31/2025    AST 25 05/31/2025    K 4.1 05/31/2025     05/31/2025    CREATININE 0.79 05/31/2025    BUN 9 05/31/2025    CO2 29 05/31/2025    GLUF 95 05/31/2025    HGBA1C 5.7 (H) 05/31/2025     No results found for: \"URICACID\"  Invalid input(s): \"BASENAME\" Vitamin D    No results found.     POCT Labs        Depression Screening and Follow-up Plan: Patient's depression screening was positive with a PHQ-9 score of 12.   Patient advised to follow-up with PCP for further management.                     Answers submitted by the patient for this visit:  Annual Physical (Submitted on 6/1/2025)  Diet/Nutrition choices: no special diet  Exercise choices: no formal exercise, walking, less than 30 minutes on average  Sleep choices: 4-6 hours of sleep on average, unrefreshing sleep  Hearing choices: normal hearing bilateral ears  Vision choices: most recent eye exam > 1 year ago, wears glasses  Dental choices: no dental visits for > 1 year, brushes teeth once daily  Do you currently have an OB/GYN provider that you routinely follow with?: No  Menopausal status: postmenopausal  Any history of sexual transmitted disease/infection?: No  Contraception: hysterectomy  Do you have an advance " directive/living will?: No  Do you have a durable power of  (POA)?: No

## 2025-06-02 NOTE — ASSESSMENT & PLAN NOTE
Worsening. Recommend lifestyle modifications. To consider GLP1-A in future PRN?       Orders:    Ambulatory Referral to Nutrition Services; Future    TSH, 3rd generation with Free T4 reflex; Future    Vitamin D 25 hydroxy; Future

## 2025-06-02 NOTE — ASSESSMENT & PLAN NOTE
Stable on Trazodone 50 mg nightly as needed.  To consider increase Trazodone 75mg QHS PRN in future?      Orders:    traZODone (DESYREL) 50 mg tablet; Take 1 tablet (50 mg total) by mouth daily at bedtime as needed for sleep    TSH, 3rd generation with Free T4 reflex; Future

## 2025-06-02 NOTE — ASSESSMENT & PLAN NOTE
New.  Recommend lifestyle modifications.    Orders:    Ambulatory Referral to Nutrition Services; Future    Comprehensive metabolic panel; Future    Hemoglobin A1C; Future      Call Insurance to Ask About Morbid Obesity, Prediabetes, Hyperlipidemia, Depression, GERD Med Coverage -      GLP-1 Agonists - Injectable - Zepbound, Wegovy, Saxenda        Please let us know about your medication selection and availability at pharmacy of your choice.

## 2025-06-02 NOTE — ASSESSMENT & PLAN NOTE
Unstable.  Increase Buspar 15mg 1.5 tabs BID.  Continue Trintellix 20mg QD.   Counseling advised.       Orders:    busPIRone (BUSPAR) 15 mg tablet; 1.5 pill in AM and 1 pill in PM x 1 week, then 1.5 pills twice daily.    Comprehensive metabolic panel; Future    Magnesium; Future    Comprehensive metabolic panel; Future    TSH, 3rd generation with Free T4 reflex; Future    Magnesium; Future

## 2025-06-02 NOTE — ASSESSMENT & PLAN NOTE
Depression Screening Follow-up Plan: Patient's depression screening was positive with a PHQ-9 score of 9.     Unstable.  Increase Buspar 15mg 1.5 tabs BID.  Continue Trintellix 20mg QD.   Counseling advised.     Orders:    Vortioxetine HBr (Trintellix) 20 MG tablet; Take 1 tablet (20 mg total) by mouth daily

## 2025-06-02 NOTE — ASSESSMENT & PLAN NOTE
Stable. Continue MVI.       Orders:    Ambulatory Referral to Nutrition Services; Future    Comprehensive metabolic panel; Future    Vitamin D 25 hydroxy; Future

## 2025-06-03 ENCOUNTER — PATIENT MESSAGE (OUTPATIENT)
Dept: FAMILY MEDICINE CLINIC | Facility: CLINIC | Age: 35
End: 2025-06-03

## 2025-06-03 DIAGNOSIS — E66.01 MORBID OBESITY (HCC): Primary | ICD-10-CM

## 2025-06-03 DIAGNOSIS — E78.5 HYPERLIPIDEMIA, UNSPECIFIED HYPERLIPIDEMIA TYPE: ICD-10-CM

## 2025-06-03 DIAGNOSIS — R73.01 IFG (IMPAIRED FASTING GLUCOSE): ICD-10-CM

## 2025-06-03 DIAGNOSIS — K21.9 GASTROESOPHAGEAL REFLUX DISEASE, UNSPECIFIED WHETHER ESOPHAGITIS PRESENT: ICD-10-CM

## 2025-06-19 DIAGNOSIS — G47.09 OTHER INSOMNIA: ICD-10-CM

## 2025-06-20 RX ORDER — TRAZODONE HYDROCHLORIDE 50 MG/1
50 TABLET ORAL
Qty: 90 TABLET | Refills: 2 | Status: SHIPPED | OUTPATIENT
Start: 2025-06-20

## 2025-07-21 ENCOUNTER — TELEPHONE (OUTPATIENT)
Dept: FAMILY MEDICINE CLINIC | Facility: CLINIC | Age: 35
End: 2025-07-21

## 2025-07-21 NOTE — TELEPHONE ENCOUNTER
Called pt lm to call back and r/s no show appt with Dr Hurley from 7/21/25   normal rate and rhythm, no chest pain and no edema.

## 2025-08-01 ENCOUNTER — OFFICE VISIT (OUTPATIENT)
Dept: FAMILY MEDICINE CLINIC | Facility: CLINIC | Age: 35
End: 2025-08-01
Payer: COMMERCIAL

## 2025-08-01 VITALS
DIASTOLIC BLOOD PRESSURE: 78 MMHG | RESPIRATION RATE: 16 BRPM | SYSTOLIC BLOOD PRESSURE: 108 MMHG | TEMPERATURE: 97.8 F | HEART RATE: 93 BPM | BODY MASS INDEX: 43.79 KG/M2 | HEIGHT: 62 IN | WEIGHT: 238 LBS | OXYGEN SATURATION: 96 %

## 2025-08-01 DIAGNOSIS — G47.09 OTHER INSOMNIA: ICD-10-CM

## 2025-08-01 DIAGNOSIS — F41.9 ANXIETY: Primary | ICD-10-CM

## 2025-08-01 DIAGNOSIS — E66.01 MORBID OBESITY (HCC): ICD-10-CM

## 2025-08-01 DIAGNOSIS — F33.1 MODERATE EPISODE OF RECURRENT MAJOR DEPRESSIVE DISORDER (HCC): ICD-10-CM

## 2025-08-01 PROCEDURE — 99214 OFFICE O/P EST MOD 30 MIN: CPT | Performed by: FAMILY MEDICINE

## 2025-08-01 RX ORDER — BUSPIRONE HYDROCHLORIDE 30 MG/1
30 TABLET ORAL 2 TIMES DAILY
Qty: 60 TABLET | Refills: 1 | Status: SHIPPED | OUTPATIENT
Start: 2025-08-01